# Patient Record
Sex: FEMALE | Race: WHITE | NOT HISPANIC OR LATINO | URBAN - NONMETROPOLITAN AREA
[De-identification: names, ages, dates, MRNs, and addresses within clinical notes are randomized per-mention and may not be internally consistent; named-entity substitution may affect disease eponyms.]

---

## 2021-02-25 ENCOUNTER — TELEMEDICINE (OUTPATIENT)
Dept: PSYCHIATRY | Facility: CLINIC | Age: 56
End: 2021-02-25

## 2021-02-25 DIAGNOSIS — F33.1 MAJOR DEPRESSIVE DISORDER, RECURRENT EPISODE, MODERATE (HCC): Primary | Chronic | ICD-10-CM

## 2021-02-25 DIAGNOSIS — G47.9 SLEEPING DIFFICULTIES: ICD-10-CM

## 2021-02-25 DIAGNOSIS — F41.9 ANXIETY DISORDER, UNSPECIFIED TYPE: Chronic | ICD-10-CM

## 2021-02-25 PROCEDURE — 90792 PSYCH DIAG EVAL W/MED SRVCS: CPT | Performed by: NURSE PRACTITIONER

## 2021-02-25 RX ORDER — METHOCARBAMOL 500 MG/1
500 TABLET, FILM COATED ORAL 3 TIMES DAILY PRN
COMMUNITY
End: 2022-08-09

## 2021-02-25 RX ORDER — CALCIUM CARBONATE 300MG(750)
10 TABLET,CHEWABLE ORAL
COMMUNITY

## 2021-02-25 RX ORDER — BUSPIRONE HYDROCHLORIDE 10 MG/1
10 TABLET ORAL 2 TIMES DAILY
COMMUNITY
End: 2021-03-24 | Stop reason: SDUPTHER

## 2021-02-25 RX ORDER — TEMAZEPAM 30 MG/1
30 CAPSULE ORAL NIGHTLY PRN
COMMUNITY
End: 2022-11-08

## 2021-02-25 RX ORDER — LORATADINE 10 MG/1
10 CAPSULE, LIQUID FILLED ORAL DAILY
COMMUNITY
End: 2022-11-08

## 2021-02-25 RX ORDER — VENLAFAXINE HYDROCHLORIDE 75 MG/1
75 CAPSULE, EXTENDED RELEASE ORAL DAILY
COMMUNITY
End: 2021-03-24 | Stop reason: SDUPTHER

## 2021-02-25 NOTE — TREATMENT PLAN
Multi-Disciplinary Problems (from Behavioral Health Treatment Plan)    Active Problems     Problem: Anxiety  Start Date: 02/25/21    Problem Details: The patient self-scales this problem as a 9 with 10 being the worst.        Goal Priority Start Date Expected End Date End Date    Patient will develop and implement behavioral and cognitive strategies to reduce anxiety and irrational fears. -- 02/25/21 -- --    Goal Details: Progress toward goal:  Not appropriate to rate progress toward goal since this is the initial treatment plan.        Goal Intervention Frequency Start Date End Date    Help patient explore past emotional issues in relation to present anxiety. Q Month 02/25/21 --    Intervention Details: Duration of treatment until until remission of symptoms.        Goal Intervention Frequency Start Date End Date    Help patient develop an awareness of their cognitive and physical responses to anxiety. Q Month 02/25/21 --    Intervention Details: Duration of treatment until until remission of symptoms.              Problem: Depression  Start Date: 02/25/21    Problem Details: The patient self-scales this problem as a 7-8 with 10 being the worst.        Goal Priority Start Date Expected End Date End Date    Patient will demonstrate the ability to initiate new constructive life skills outside of sessions on a consistent basis. -- 02/25/21 -- --    Goal Details: Progress toward goal:  Not appropriate to rate progress toward goal since this is the initial treatment plan.        Goal Intervention Frequency Start Date End Date    Assist patient in setting attainable activities of daily living goals. PRN 02/25/21 --    Goal Intervention Frequency Start Date End Date    Provide education about depression Q Month 02/25/21 --    Intervention Details: Duration of treatment until until remission of symptoms.        Goal Intervention Frequency Start Date End Date    Assist patient in developing healthy coping strategies. Q  Month 02/25/21 --    Intervention Details: Duration of treatment until until remission of symptoms.                           I have discussed and reviewed this treatment plan with the patient.  The patient has verbally agreed with this treatment plan (no signatures are obtained at today's visit as the patient is a telehealth patient and is unable to print and sign this document, therefore verbal agreement is obtained).

## 2021-03-23 ENCOUNTER — BULK ORDERING (OUTPATIENT)
Dept: CASE MANAGEMENT | Facility: OTHER | Age: 56
End: 2021-03-23

## 2021-03-23 DIAGNOSIS — Z23 IMMUNIZATION DUE: ICD-10-CM

## 2021-03-24 ENCOUNTER — TELEMEDICINE (OUTPATIENT)
Dept: PSYCHIATRY | Facility: CLINIC | Age: 56
End: 2021-03-24

## 2021-03-24 DIAGNOSIS — F41.9 ANXIETY DISORDER, UNSPECIFIED TYPE: Chronic | ICD-10-CM

## 2021-03-24 DIAGNOSIS — F33.1 MAJOR DEPRESSIVE DISORDER, RECURRENT EPISODE, MODERATE (HCC): Primary | Chronic | ICD-10-CM

## 2021-03-24 DIAGNOSIS — G47.9 SLEEPING DIFFICULTIES: ICD-10-CM

## 2021-03-24 PROCEDURE — 99214 OFFICE O/P EST MOD 30 MIN: CPT | Performed by: NURSE PRACTITIONER

## 2021-03-24 RX ORDER — VENLAFAXINE HYDROCHLORIDE 75 MG/1
75 CAPSULE, EXTENDED RELEASE ORAL DAILY
Qty: 30 CAPSULE | Refills: 0 | Status: SHIPPED | OUTPATIENT
Start: 2021-03-24 | End: 2021-04-21 | Stop reason: SDUPTHER

## 2021-03-24 RX ORDER — BUSPIRONE HYDROCHLORIDE 15 MG/1
15 TABLET ORAL 2 TIMES DAILY
Qty: 60 TABLET | Refills: 0 | Status: SHIPPED | OUTPATIENT
Start: 2021-03-24 | End: 2021-04-21 | Stop reason: SDUPTHER

## 2021-03-24 NOTE — PROGRESS NOTES
"This provider is located at the [facility] (through [facility]), [address], [place], [address] using a secure [email or web address] Video Visit through [employer]. Patient is being seen remotely via telehealth at their home address in [place], and stated they are in a secure environment for this session. The patient's condition being diagnosed/treated is appropriate for telemedicine. The provider identified herself as well as her credentials.   The patient, and/or patients guardian, consent to be seen remotely, and when consent is given they understand that the consent allows for patient identifiable information to be sent to a third party as needed.   They may refuse to be seen remotely at any time. The electronic data is encrypted and password protected, and the patient and/or guardian has been advised of the potential risks to privacy not withstanding such measures.    You have chosen to receive care through a telehealth visit.  Do you consent to use a video/audio connection for your medical care today? Yes        Subjective   Lovely Mixon is a 56 y.o. female who presents today for follow up    Chief Complaint:  Depression, anxiety, and sleeping difficulties    Accompanied by: The patient is alone at today's encounter    History of Present Illness:   The patient describes her mood as about the same over the last few weeks.  The patient states she has had \"a bad few weeks\" with not having any income, \"it being tax time\", and trying to deal with her divorce.  She also states the \"little girl\", a 12 year old female, she has custody of is having some trouble, and that has worsened her moods some.  The patient states she has an upcoming court appointment due to her divorce, and this has her nervous.  She states her PCP also recently referred her to Neurology since she is working on re-obtaining her disability.  She reports the neurologist told her physically she should not have any " "difficulty obtaining her disability, but since she has an associates degree that may make things more difficult.  The patient reports her appetite as good.  The patient reports her sleep as fair.  She states she wakes up all through the night, but is able to fall back to sleep, it is just interrupted sleep.  The patient denies any nightmares, but states she is having \"random dreams\".  The patient denies any new medical problems or changes in medications since last appointment with this facility.  The patient reports compliance with current medication regimen.  The patient denies any current side effects from her current medication regimen.  The patient denies any abnormal muscle movements or tics.  The patient rates her depression at a 7/10 on a 0-10 scale, with 10 being the worst.  The patient rates her anxiety at a 7/10 on a 0-10 scale, with 10 being the worst.  The patient would like to increase her medications at this visit to help with her anxiety.  The patient denies any suicidal or homicidal ideations, plans, or intent at today's encounter and is convincing.  The patient denies any auditory hallucinations or visual hallucinations.  The patient does not endorse any significant symptoms consistent with lea or psychosis at today's encounter.      Primary Care Provider:  Mine Abarca    Prior Psychiatric Medications:  Zoloft - states was taking 100 mg and she was still having anxiety and mood symptoms  Effexor XR 75 mg by mouth once daily  Buspirone 10 mg by mouth twice daily  Temazepam 30 mg by mouth once daily at bedtime - states she has taken this since around 2014 for cerebral palsy and also sleep    Last Menstrual Period:  Uterine ablation 1/6/21.  Denies any chance of pregnancy.  The patient was educated that her prescribed medications can have potential risk to a developing fetus. The patient is advised to contact this APRN/this office if she becomes pregnant or plans to become pregnant.  Pt " verbalizes understanding and acknowledged agreement with this plan in her own words.          The following portions of the patient's history were reviewed and updated as appropriate: allergies, current medications, past family history, past medical history, past social history, past surgical history and problem list.          Past Medical History:  Past Medical History:   Diagnosis Date   • Anxiety    • Cerebral palsy (CMS/HCC)    • Depression    • Lazy eye    • PTSD (post-traumatic stress disorder)    • Seasonal allergies    • Vision decreased        Social History:  Social History     Socioeconomic History   • Marital status: Unknown     Spouse name: Not on file   • Number of children: Not on file   • Years of education: Not on file   • Highest education level: Not on file   Tobacco Use   • Smoking status: Current Every Day Smoker     Packs/day: 0.50   • Smokeless tobacco: Never Used   Substance and Sexual Activity   • Alcohol use: Not Currently     Comment: States an occasional social drink, but not often   • Drug use: Never   • Sexual activity: Not Currently     Partners: Male       Family History:  Family History   Problem Relation Age of Onset   • Heart attack Mother    • Stroke Father    • Alcohol abuse Father    • Anxiety disorder Sister    • Depression Sister    • Alcohol abuse Sister    • Breast cancer Other        Past Surgical History:  Past Surgical History:   Procedure Laterality Date   • CATARACT EXTRACTION     • ENDOMETRIAL ABLATION     • LEG SURGERY      bilateral lower extremity surgeries due to cerebral palsy complications       Problem List:  There is no problem list on file for this patient.      Allergy:   No Known Allergies     Current Medications:   Current Outpatient Medications   Medication Sig Dispense Refill   • busPIRone (BUSPAR) 15 MG tablet Take 1 tablet by mouth 2 (Two) Times a Day. 60 tablet 0   • Loratadine 10 MG capsule Take 10 mg by mouth Daily.     • Melatonin 10 MG tablet  dispersible Place 10 mg on the tongue every night at bedtime.     • methocarbamol (ROBAXIN) 500 MG tablet Take 500 mg by mouth 3 (Three) Times a Day As Needed for Muscle Spasms.     • temazepam (RESTORIL) 30 MG capsule Take 30 mg by mouth At Night As Needed for Sleep.     • venlafaxine XR (EFFEXOR-XR) 75 MG 24 hr capsule Take 1 capsule by mouth Daily. 30 capsule 0     No current facility-administered medications for this visit.       Review of Symptoms:    Review of Systems   Constitutional: Positive for activity change, appetite change and fatigue. Negative for chills and fever.   HENT: Negative.    Eyes: Negative.    Respiratory: Negative.    Cardiovascular: Negative.    Gastrointestinal: Negative.    Endocrine: Negative.    Genitourinary: Negative.    Musculoskeletal: Negative.    Skin: Negative.    Neurological: Negative.    Psychiatric/Behavioral: Positive for agitation, decreased concentration, sleep disturbance, depressed mood and stress. Negative for behavioral problems, dysphoric mood, hallucinations, self-injury, suicidal ideas and negative for hyperactivity. The patient is nervous/anxious.          Physical Exam:   There were no vitals taken for this visit. There is no height or weight on file to calculate BMI.   Due to the remote nature of this encounter (virtual encounter), vitals were unable to be obtained.  Height stated at 65 inches.  Weight stated at 155-160 pounds.      Physical Exam  Constitutional:       Appearance: She is well-developed.   Neurological:      Mental Status: She is alert and oriented to person, place, and time.   Psychiatric:         Attention and Perception: Attention normal.         Mood and Affect: Mood is depressed. Affect is blunt.         Speech: Speech normal.         Behavior: Behavior normal. Behavior is cooperative.         Thought Content: Thought content normal. Thought content does not include homicidal or suicidal ideation. Thought content does not include homicidal  or suicidal plan.         Cognition and Memory: Cognition and memory normal.         Judgment: Judgment normal.         Mental Status Exam:   Hygiene:   good  Cooperation:  Cooperative  Eye Contact:  Fair  Psychomotor Behavior:  Appropriate  Affect:  Blunted  Mood: depressed  Hopelessness: Denies  Speech:  Normal  Thought Process:  Goal directed and Linear  Thought Content:  Mood congruent  Suicidal:  None  Homicidal:  None  Hallucinations:  None  Delusion:  None  Memory:  Intact  Orientation:  Person, Place, Time and Situation  Reliability:  fair  Insight:  Fair  Judgement:  Fair  Impulse Control:  Fair  Physical/Medical Issues:  No        PHQ-9 Depression Screening  Little interest or pleasure in doing things? 2   Feeling down, depressed, or hopeless? 2   Trouble falling or staying asleep, or sleeping too much? 1   Feeling tired or having little energy? 1   Poor appetite or overeating? 1   Feeling bad about yourself - or that you are a failure or have let yourself or your family down? 2   Trouble concentrating on things, such as reading the newspaper or watching television? 1   Moving or speaking so slowly that other people could have noticed? Or the opposite - being so fidgety or restless that you have been moving around a lot more than usual? 1   Thoughts that you would be better off dead, or of hurting yourself in some way? 1   PHQ-9 Total Score 12   If you checked off any problems, how difficult have these problems made it for you to do your work, take care of things at home, or get along with other people? Somewhat difficult     PHQ-9 Total Score: 12      Feeling nervous, anxious or on edge: More than half the days  Not being able to stop or control worrying: Nearly every day  Worrying too much about different things: Nearly every day  Trouble Relaxing: Several days  Being so restless that it is hard to sit still: More than half the days  Feeling afraid as if something awful might happen: More than half the  days  Becoming easily annoyed or irritable: More than half the days  THIAGO 7 Total Score: 15  If you checked any problems, how difficult have these problems made it for you to do your work, take care of things at home, or get along with other people: Somewhat difficult      PROMIS scale screening tool that patient filled out virtually reviewed by this APRN at today's encounter.          Lab Results:   No visits with results within 1 Month(s) from this visit.   Latest known visit with results is:   No results found for any previous visit.         Assessment/Plan   Problems Addressed this Visit     None      Visit Diagnoses     Major depressive disorder, recurrent episode, moderate (CMS/HCC)  (Chronic)   -  Primary    Relevant Medications    busPIRone (BUSPAR) 15 MG tablet    venlafaxine XR (EFFEXOR-XR) 75 MG 24 hr capsule    Anxiety disorder, unspecified type  (Chronic)       Relevant Medications    busPIRone (BUSPAR) 15 MG tablet    venlafaxine XR (EFFEXOR-XR) 75 MG 24 hr capsule    Sleeping difficulties          Diagnoses       Codes Comments    Major depressive disorder, recurrent episode, moderate (CMS/HCC)    -  Primary ICD-10-CM: F33.1  ICD-9-CM: 296.32     Anxiety disorder, unspecified type     ICD-10-CM: F41.9  ICD-9-CM: 300.00     Sleeping difficulties     ICD-10-CM: G47.9  ICD-9-CM: 780.50           Visit Diagnoses:    ICD-10-CM ICD-9-CM   1. Major depressive disorder, recurrent episode, moderate (CMS/HCC)  F33.1 296.32   2. Anxiety disorder, unspecified type  F41.9 300.00   3. Sleeping difficulties  G47.9 780.50          GOALS:  Short Term Goals: Patient will be compliant with medication, and patient will have no significant medication related side effects.  Patient will be engaged in psychotherapy as indicated.  Patient will report subjective improvement of symptoms.  Long term goals: To stabilize mood and treat/improve subjective symptoms, the patient will stay out of the hospital, the patient will be at  an optimal level of functioning, and the patient will take all medications as prescribed.  The patient verbalized understanding and agreement with goals that were mutually set.      TREATMENT PLAN: Continue supportive psychotherapy efforts and medications as indicated.  The patient states she would like this APRN to take over prescribing her Effexor and buspirone from her primary care provider.  Continue Effexor XR 75 mg by mouth once daily in the mornings for mood.  Increase buspirone to 15 mg by mouth twice daily for mood.  Medication and treatment options, both pharmacological and non-pharmacological treatment options, discussed during today's visit, including any off label use of medication. Patient acknowledged and verbally consented with current treatment plan and was educated on the importance of compliance with treatment and follow-up appointments.      Lovely Mixon  reports that she has been smoking. She has been smoking about 0.50 packs per day. She has never used smokeless tobacco.. I have educated her on the risk of diseases from using tobacco products such as cancer, COPD and heart disease.     I advised her to quit and she is not willing to quit.    I spent 3  minutes counseling the patient.      MEDICATION ISSUES:  Discussed medication options and treatment plan of prescribed medication, any off label use of medication, as well as the risks, benefits, any black box warnings including increased suicidality, and side effects including but not limited to potential falls, dizziness, possible impaired driving, GI side effects (change in appetite, abdominal discomfort, nausea, vomiting, diarrhea, and/or constipation), dry mouth, somnolence, sedation, insomnia, activation, agitation, irritation, tremors, abnormal muscle movements or disorders, headache, sweating, possible bruising or rare bleeding, electrolyte and/or fluid abnormalities, change in blood pressure/heart rate/and or heart rhythm, sexual  dysfunction, and metabolic adversities among others. Patient and/or guardian agreeable to call the office with any worsening of symptoms or onset of side effects, or if any concerns or questions arise.  The contact information for the office is made available to the patient and/or guardian.  Patient and/or guardian agreeable to call 911 or go to the nearest ER should they begin having any SI/HI, or if any urgent concerns arise. No medication side effects or related complaints today.      SUICIDE RISK ASSESSMENT: Unalterable demographics and a history of mental health intervention indicate this patient is in a high risk category compared to the general population. At present, the patient denies active SI/HI, intentions, or plans at this time and agrees to seek immediate care should such thoughts develop. The patient verbalizes understanding of how to access emergency care if needed and agrees to do so. Consideration of suicide risk and protective factors such as history, current presentation, individual strengths and weaknesses, psychosocial and environmental stressors and variables, psychiatric illness and symptoms, medical conditions and pain, took place in this interview. Based on those considerations, the patient is determined: within individual baseline and presenting no imminent risk for suicide or homicide. Other recommendations: The patient does not meet the criteria for inpatient admission and is not a safety risk to self or others at today's visit. Inpatient treatment offers no significant advantages over outpatient treatment for this patient at today's visit.      SAFETY PLAN:  Patient was given ample time for questions and fully participated in treatment planning.  Patient was encouraged to call the clinic with any questions or concerns.  Patient was informed of access to emergency care. If patient were to develop any significant symptomatology, suicidal ideation, homicidal ideation, any concerns, or feel  unsafe at any time they are to call the clinic and if unable to get immediate assistance should immediately call 911 or go to the nearest emergency room.  The patient is advised to remove or secure (lock away) all lethal weapons (including guns) and sharps (including razors, scissors, knives, etc.).  All medications (including any prescribed and any over the counter medications) should be stored in a safe and secured location that is not obtainable by children/adolescents.  Patient was given an opportunity and encouraged to ask questions about their medication, illness, and treatment. Patient contracted verbally for the following: If you are experiencing an emotional crisis or have thoughts of harming yourself or others, please go to your nearest local emergency room or call 911. Will continue to re-assess medication response and side effects frequently to establish efficacy and ensure safety. Risks, any black box warnings, side effects, off label usage, and benefits of medication and treatment discussed with patient, along with potential adverse side effects of current and/or newly prescribed medication, alternative treatment options, and OTC medications.  Patient verbalized understanding of potential risks, any off label use of medication, any black box warnings, and any side effects in their own words. The patient verbalized understanding and agreed to comply with the safety plan discussed in their own words.  Patient given the number to the office. Number also available to the 24- hour suicide hotline.        MEDS ORDERED DURING VISIT:  New Medications Ordered This Visit   Medications   • busPIRone (BUSPAR) 15 MG tablet     Sig: Take 1 tablet by mouth 2 (Two) Times a Day.     Dispense:  60 tablet     Refill:  0   • venlafaxine XR (EFFEXOR-XR) 75 MG 24 hr capsule     Sig: Take 1 capsule by mouth Daily.     Dispense:  30 capsule     Refill:  0     Return in about 4 weeks (around 4/21/2021), or if symptoms worsen  or fail to improve, for Recheck.         Progress toward goal: Not at goal    Functional Status: Moderate impairment     Prognosis: Fair with Ongoing Treatment      Treatment plan completed: 2/25/21.            This document has been electronically signed by GISELA Vaughn  March 24, 2021 11:47 EDT    Please note that portions of this note were completed with a voice recognition program. Efforts were made to edit dictation, but occasionally words are mistranscribed.

## 2021-04-21 DIAGNOSIS — F33.1 MAJOR DEPRESSIVE DISORDER, RECURRENT EPISODE, MODERATE (HCC): Chronic | ICD-10-CM

## 2021-04-21 DIAGNOSIS — F41.9 ANXIETY DISORDER, UNSPECIFIED TYPE: Chronic | ICD-10-CM

## 2021-04-21 RX ORDER — BUSPIRONE HYDROCHLORIDE 15 MG/1
15 TABLET ORAL 2 TIMES DAILY
Qty: 60 TABLET | Refills: 0 | Status: SHIPPED | OUTPATIENT
Start: 2021-04-21 | End: 2021-05-12 | Stop reason: SDUPTHER

## 2021-04-21 RX ORDER — VENLAFAXINE HYDROCHLORIDE 75 MG/1
75 CAPSULE, EXTENDED RELEASE ORAL DAILY
Qty: 30 CAPSULE | Refills: 0 | Status: SHIPPED | OUTPATIENT
Start: 2021-04-21 | End: 2021-05-12 | Stop reason: SDUPTHER

## 2021-04-21 NOTE — TELEPHONE ENCOUNTER
I received a refill request for this patient, but the patient does not have a follow-up appointment scheduled.  A refill will be sent in this time, but the patient needs to be seen.  Can you please call and schedule a follow-up appointment for the patient at their convenience?  Thanks.

## 2021-05-07 ENCOUNTER — TELEPHONE (OUTPATIENT)
Dept: PSYCHIATRY | Facility: CLINIC | Age: 56
End: 2021-05-07

## 2021-05-10 NOTE — TELEPHONE ENCOUNTER
Patient can request medical records if needed.  Patient has only been seen twice, and has not been seen since March.  Patient has an upcoming appointment scheduled 5/12/21, we can discuss then if needed.

## 2021-05-12 ENCOUNTER — TELEMEDICINE (OUTPATIENT)
Dept: PSYCHIATRY | Facility: CLINIC | Age: 56
End: 2021-05-12

## 2021-05-12 DIAGNOSIS — F41.9 ANXIETY DISORDER, UNSPECIFIED TYPE: Chronic | ICD-10-CM

## 2021-05-12 DIAGNOSIS — Z86.59 HISTORY OF POSTTRAUMATIC STRESS DISORDER (PTSD): ICD-10-CM

## 2021-05-12 DIAGNOSIS — G47.9 SLEEPING DIFFICULTIES: ICD-10-CM

## 2021-05-12 DIAGNOSIS — F33.1 MAJOR DEPRESSIVE DISORDER, RECURRENT EPISODE, MODERATE (HCC): Primary | Chronic | ICD-10-CM

## 2021-05-12 PROCEDURE — 99214 OFFICE O/P EST MOD 30 MIN: CPT | Performed by: NURSE PRACTITIONER

## 2021-05-12 RX ORDER — BUSPIRONE HYDROCHLORIDE 15 MG/1
15 TABLET ORAL 3 TIMES DAILY
Qty: 90 TABLET | Refills: 0 | Status: SHIPPED | OUTPATIENT
Start: 2021-05-12 | End: 2021-06-07 | Stop reason: SDUPTHER

## 2021-05-12 RX ORDER — VENLAFAXINE HYDROCHLORIDE 75 MG/1
75 CAPSULE, EXTENDED RELEASE ORAL DAILY
Qty: 30 CAPSULE | Refills: 0 | Status: SHIPPED | OUTPATIENT
Start: 2021-05-12 | End: 2021-06-07 | Stop reason: SDUPTHER

## 2021-05-12 NOTE — PROGRESS NOTES
This provider is located at the [facility] (through [facility]), [address], [place], [address] using a secure [employer] Video Visit through [employer]. Patient is being seen remotely via telehealth at their home address in [place], and stated they are in a secure environment for this session. The patient's condition being diagnosed/treated is appropriate for telemedicine. The provider identified herself as well as her credentials.   The patient, and/or patients guardian, consent to be seen remotely, and when consent is given they understand that the consent allows for patient identifiable information to be sent to a third party as needed.   They may refuse to be seen remotely at any time. The electronic data is encrypted and password protected, and the patient and/or guardian has been advised of the potential risks to privacy not withstanding such measures.    You have chosen to receive care through a telehealth visit.  Do you consent to use a video/audio connection for your medical care today? Yes        Subjective   Lovely Mixon is a 56 y.o. female who presents today for follow up    Chief Complaint:  Depression, anxiety, and sleeping difficulties    Accompanied by: The patient's son is present during today's encounter, the patient gives verbal consent for her son to be present    History of Present Illness:   The patient describes her mood as anxious over the last few weeks.  The patient states she has been under a significant amount of stress recently regarding her divorce.  The patient states she has also been under a lot of stress with her 12-year-old daughter that she adopted.  The patient states her 12-year-old daughter has been having a lot of behavioral issues.  The patient states she is still in therapy, and therapy is going good at this time.  The patient states both her, and her therapist, feels she may need her medication adjusted to help with her worsening  "psychiatric symptoms, especially her anxiety.  The patient rates her depression at a 8/10 on a 0-10 scale, with 10 being the worst.  The patient reports her symptoms of depression as being \"depressed\" and crying a lot.  The patient rates her anxiety at a 8-9/10 on a 0-10 scale, with 10 being the worst.  The patient reports her symptoms of anxiety as feeling shaky all the time and crying a lot.  The patient reports her appetite as fair.  She states she is making herself eat.  The patient reports her sleep as fluctuating.  She states she is waking up at night a lot.  The patient states she averages around 5 hours of sleep each night, but it is interrupted.  The patient denies any recent nightmares. The patient denies any new medical problems or changes in medications since last appointment with this facility.  The patient states she does plan to file for disability, and reports she needs a letter from this APRN with her current diagnoses.  The patient is verbally requesting, at today's encounter, for this APRN to send her a letter with her current diagnoses.  The patient states her therapist is writing a letter for her also, and informed her that a letter from this APRN may be beneficial.  She states her primary care provider is also going to write a letter about her cerebral palsy.  The patient reports compliance with current medication regimen, she states she is only missed a couple doses here and there.  The patient states if she does happen to miss a dose of her medication she can tell a huge worsening of her moods.  The patient denies any current side effects from her current medication regimen.  The patient denies any abnormal muscle movements or tics.   The patient would like to increase her medications at this visit, specifically her BuSpar to help with her anxiety.  The patient states both her depressive and anxious symptoms are worsened, but she feels her anxiety is the worst at this time..  The patient " denies any suicidal or homicidal ideations, plans, or intent at today's encounter and is convincing.  The patient denies any auditory hallucinations or visual hallucinations.  The patient does not endorse any significant symptoms consistent with lea or psychosis at today's encounter.      Primary Care Provider:  Mine Abarca    Prior Psychiatric Medications:  Zoloft - states was taking 100 mg and she was still having anxiety and mood symptoms  Effexor XR 75 mg by mouth once daily  Buspirone 10 mg by mouth twice daily  Temazepam 30 mg by mouth once daily at bedtime - states she has taken this since around 2014 for cerebral palsy and also sleep    Last Menstrual Period:  Uterine ablation 1/6/21.  Denies any chance of pregnancy.  The patient was educated that her prescribed medications can have potential risk to a developing fetus. The patient is advised to contact this APRN/this office if she becomes pregnant or plans to become pregnant.  Pt verbalizes understanding and acknowledged agreement with this plan in her own words.          The following portions of the patient's history were reviewed and updated as appropriate: allergies, current medications, past family history, past medical history, past social history, past surgical history and problem list.          Past Medical History:  Past Medical History:   Diagnosis Date   • Anxiety    • Cerebral palsy (CMS/HCC)    • Depression    • Lazy eye    • PTSD (post-traumatic stress disorder)    • Seasonal allergies    • Vision decreased        Social History:  Social History     Socioeconomic History   • Marital status: Unknown     Spouse name: Not on file   • Number of children: Not on file   • Years of education: Not on file   • Highest education level: Not on file   Tobacco Use   • Smoking status: Current Every Day Smoker     Packs/day: 0.50   • Smokeless tobacco: Never Used   Substance and Sexual Activity   • Alcohol use: Not Currently     Comment: States  an occasional social drink, but not often   • Drug use: Never   • Sexual activity: Not Currently     Partners: Male       Family History:  Family History   Problem Relation Age of Onset   • Heart attack Mother    • Stroke Father    • Alcohol abuse Father    • Anxiety disorder Sister    • Depression Sister    • Alcohol abuse Sister    • Breast cancer Other        Past Surgical History:  Past Surgical History:   Procedure Laterality Date   • CATARACT EXTRACTION     • ENDOMETRIAL ABLATION     • LEG SURGERY      bilateral lower extremity surgeries due to cerebral palsy complications       Problem List:  There is no problem list on file for this patient.      Allergy:   No Known Allergies     Current Medications:   Current Outpatient Medications   Medication Sig Dispense Refill   • busPIRone (BUSPAR) 15 MG tablet Take 1 tablet by mouth 3 (Three) Times a Day. 90 tablet 0   • Loratadine 10 MG capsule Take 10 mg by mouth Daily.     • Melatonin 10 MG tablet dispersible Place 10 mg on the tongue every night at bedtime.     • methocarbamol (ROBAXIN) 500 MG tablet Take 500 mg by mouth 3 (Three) Times a Day As Needed for Muscle Spasms.     • temazepam (RESTORIL) 30 MG capsule Take 30 mg by mouth At Night As Needed for Sleep.     • venlafaxine XR (EFFEXOR-XR) 75 MG 24 hr capsule Take 1 capsule by mouth Daily. 30 capsule 0     No current facility-administered medications for this visit.       Review of Symptoms:    Review of Systems   Constitutional: Positive for activity change, appetite change and fatigue. Negative for chills and fever.   HENT: Negative.    Eyes: Negative.    Respiratory: Negative.    Cardiovascular: Negative.    Gastrointestinal: Negative.    Endocrine: Negative.    Genitourinary: Negative.    Musculoskeletal: Negative.    Skin: Negative.    Psychiatric/Behavioral: Positive for agitation, decreased concentration, sleep disturbance, depressed mood and stress. Negative for behavioral problems, dysphoric mood,  hallucinations, self-injury, suicidal ideas and negative for hyperactivity. The patient is nervous/anxious.          Physical Exam:   There were no vitals taken for this visit. There is no height or weight on file to calculate BMI.   Due to the remote nature of this encounter (virtual encounter), vitals were unable to be obtained.  Height stated at 65 inches.  Weight stated at 148-150 pounds.      Physical Exam  Constitutional:       Appearance: She is well-developed.   Neurological:      Mental Status: She is alert and oriented to person, place, and time.   Psychiatric:         Attention and Perception: Attention normal.         Mood and Affect: Mood is anxious.         Speech: Speech normal.         Behavior: Behavior normal. Behavior is cooperative.         Thought Content: Thought content normal. Thought content does not include homicidal or suicidal ideation. Thought content does not include homicidal or suicidal plan.         Cognition and Memory: Cognition and memory normal.         Judgment: Judgment normal.         Mental Status Exam:   Hygiene:   good  Cooperation:  Cooperative  Eye Contact:  Fair  Psychomotor Behavior:  Appropriate  Affect:  Appropriate  Mood: anxious  Hopelessness: Denies  Speech:  Normal  Thought Process:  Goal directed and Linear  Thought Content:  Mood congruent  Suicidal:  None  Homicidal:  None  Hallucinations:  None  Delusion:  None  Memory:  Intact  Orientation:  Person, Place, Time and Situation  Reliability:  fair  Insight:  Fair  Judgement:  Fair  Impulse Control:  Fair  Physical/Medical Issues:  No            Lab Results:   No visits with results within 1 Month(s) from this visit.   Latest known visit with results is:   No results found for any previous visit.         Assessment/Plan   Problems Addressed this Visit     None      Visit Diagnoses     Major depressive disorder, recurrent episode, moderate (CMS/HCC)  (Chronic)   -  Primary    Relevant Medications    busPIRone  (BUSPAR) 15 MG tablet    venlafaxine XR (EFFEXOR-XR) 75 MG 24 hr capsule    Anxiety disorder, unspecified type  (Chronic)       Relevant Medications    busPIRone (BUSPAR) 15 MG tablet    venlafaxine XR (EFFEXOR-XR) 75 MG 24 hr capsule    History of posttraumatic stress disorder (PTSD)        Relevant Medications    venlafaxine XR (EFFEXOR-XR) 75 MG 24 hr capsule    Sleeping difficulties          Diagnoses       Codes Comments    Major depressive disorder, recurrent episode, moderate (CMS/HCC)    -  Primary ICD-10-CM: F33.1  ICD-9-CM: 296.32     Anxiety disorder, unspecified type     ICD-10-CM: F41.9  ICD-9-CM: 300.00     History of posttraumatic stress disorder (PTSD)     ICD-10-CM: Z86.59  ICD-9-CM: V11.8     Sleeping difficulties     ICD-10-CM: G47.9  ICD-9-CM: 780.50           Visit Diagnoses:    ICD-10-CM ICD-9-CM   1. Major depressive disorder, recurrent episode, moderate (CMS/HCC)  F33.1 296.32   2. Anxiety disorder, unspecified type  F41.9 300.00   3. History of posttraumatic stress disorder (PTSD)  Z86.59 V11.8   4. Sleeping difficulties  G47.9 780.50          GOALS:  Short Term Goals: Patient will be compliant with medication, and patient will have no significant medication related side effects.  Patient will be engaged in psychotherapy as indicated.  Patient will report subjective improvement of symptoms.  Long term goals: To stabilize mood and treat/improve subjective symptoms, the patient will stay out of the hospital, the patient will be at an optimal level of functioning, and the patient will take all medications as prescribed.  The patient verbalized understanding and agreement with goals that were mutually set.      TREATMENT PLAN: Continue supportive psychotherapy efforts and medications as indicated.  Medication and treatment options, both pharmacological and non-pharmacological treatment options, discussed during today's visit, including any off label use of medication. Patient acknowledged and  verbally consented with current treatment plan and was educated on the importance of compliance with treatment and follow-up appointments.      - Continue Effexor XR 75 mg by mouth once daily in the mornings for mood.  - Increase buspirone to 15 mg by mouth three times daily for mood.       MEDICATION ISSUES:  Discussed medication options and treatment plan of prescribed medication, any off label use of medication, as well as the risks, benefits, any black box warnings including increased suicidality, and side effects including but not limited to potential falls, dizziness, possible impaired driving, GI side effects (change in appetite, abdominal discomfort, nausea, vomiting, diarrhea, and/or constipation), dry mouth, somnolence, sedation, insomnia, activation, agitation, irritation, tremors, abnormal muscle movements or disorders, headache, sweating, possible bruising or rare bleeding, electrolyte and/or fluid abnormalities, change in blood pressure/heart rate/and or heart rhythm, sexual dysfunction, and metabolic adversities among others. Patient and/or guardian agreeable to call the office with any worsening of symptoms or onset of side effects, or if any concerns or questions arise.  The contact information for the office is made available to the patient and/or guardian.  Patient and/or guardian agreeable to call 911 or go to the nearest ER should they begin having any SI/HI, or if any urgent concerns arise. No medication side effects or related complaints today.      SUICIDE RISK ASSESSMENT: Unalterable demographics and a history of mental health intervention indicate this patient is in a high risk category compared to the general population. At present, the patient denies active SI/HI, intentions, or plans at this time and agrees to seek immediate care should such thoughts develop. The patient verbalizes understanding of how to access emergency care if needed and agrees to do so. Consideration of suicide risk  and protective factors such as history, current presentation, individual strengths and weaknesses, psychosocial and environmental stressors and variables, psychiatric illness and symptoms, medical conditions and pain, took place in this interview. Based on those considerations, the patient is determined: within individual baseline and presenting no imminent risk for suicide or homicide. Other recommendations: The patient does not meet the criteria for inpatient admission and is not a safety risk to self or others at today's visit. Inpatient treatment offers no significant advantages over outpatient treatment for this patient at today's visit.      SAFETY PLAN:  Patient was given ample time for questions and fully participated in treatment planning.  Patient was encouraged to call the clinic with any questions or concerns.  Patient was informed of access to emergency care. If patient were to develop any significant symptomatology, suicidal ideation, homicidal ideation, any concerns, or feel unsafe at any time they are to call the clinic and if unable to get immediate assistance should immediately call 911 or go to the nearest emergency room.  The patient is advised to remove or secure (lock away) all lethal weapons (including guns) and sharps (including razors, scissors, knives, etc.).  All medications (including any prescribed and any over the counter medications) should be stored in a safe and secured location that is not obtainable by children/adolescents.  Patient was given an opportunity and encouraged to ask questions about their medication, illness, and treatment. Patient contracted verbally for the following: If you are experiencing an emotional crisis or have thoughts of harming yourself or others, please go to your nearest local emergency room or call 911. Will continue to re-assess medication response and side effects frequently to establish efficacy and ensure safety. Risks, any black box warnings, side  effects, off label usage, and benefits of medication and treatment discussed with patient, along with potential adverse side effects of current and/or newly prescribed medication, alternative treatment options, and OTC medications.  Patient verbalized understanding of potential risks, any off label use of medication, any black box warnings, and any side effects in their own words. The patient verbalized understanding and agreed to comply with the safety plan discussed in their own words.  Patient given the number to the office. Number also available to the 24- hour suicide hotline.          MEDS ORDERED DURING VISIT:  New Medications Ordered This Visit   Medications   • busPIRone (BUSPAR) 15 MG tablet     Sig: Take 1 tablet by mouth 3 (Three) Times a Day.     Dispense:  90 tablet     Refill:  0   • venlafaxine XR (EFFEXOR-XR) 75 MG 24 hr capsule     Sig: Take 1 capsule by mouth Daily.     Dispense:  30 capsule     Refill:  0     Return in about 4 weeks (around 6/9/2021), or if symptoms worsen or fail to improve, for Next scheduled follow up and Recheck.         Progress toward goal: Not at goal    Functional Status: Moderate impairment     Prognosis: Fair with Ongoing Treatment      Treatment plan completed: 2/25/21.            This document has been electronically signed by GISELA Vaughn  May 12, 2021 11:00 EDT    Please note that portions of this note were completed with a voice recognition program. Efforts were made to edit dictation, but occasionally words are mistranscribed.

## 2021-06-07 ENCOUNTER — TELEMEDICINE (OUTPATIENT)
Dept: PSYCHIATRY | Facility: CLINIC | Age: 56
End: 2021-06-07

## 2021-06-07 DIAGNOSIS — F41.9 ANXIETY DISORDER, UNSPECIFIED TYPE: Chronic | ICD-10-CM

## 2021-06-07 DIAGNOSIS — G47.9 SLEEPING DIFFICULTIES: ICD-10-CM

## 2021-06-07 DIAGNOSIS — F33.1 MAJOR DEPRESSIVE DISORDER, RECURRENT EPISODE, MODERATE (HCC): Primary | Chronic | ICD-10-CM

## 2021-06-07 DIAGNOSIS — Z86.59 HISTORY OF POSTTRAUMATIC STRESS DISORDER (PTSD): Chronic | ICD-10-CM

## 2021-06-07 PROCEDURE — 99214 OFFICE O/P EST MOD 30 MIN: CPT | Performed by: NURSE PRACTITIONER

## 2021-06-07 RX ORDER — BUSPIRONE HYDROCHLORIDE 30 MG/1
30 TABLET ORAL 2 TIMES DAILY
Qty: 60 TABLET | Refills: 0 | Status: SHIPPED | OUTPATIENT
Start: 2021-06-07 | End: 2021-07-07 | Stop reason: SDUPTHER

## 2021-06-07 RX ORDER — VENLAFAXINE HYDROCHLORIDE 75 MG/1
75 CAPSULE, EXTENDED RELEASE ORAL DAILY
Qty: 30 CAPSULE | Refills: 0 | Status: SHIPPED | OUTPATIENT
Start: 2021-06-07 | End: 2021-06-29 | Stop reason: SDUPTHER

## 2021-06-07 NOTE — PROGRESS NOTES
This provider is located at the [facility] (through [facility]), [address], [place], [address] using a secure [employer] Video Visit through [employer]. Patient is being seen remotely via telehealth at their home address in [place], and stated they are in a secure environment for this session. The patient's condition being diagnosed/treated is appropriate for telemedicine. The provider identified herself as well as her credentials.   The patient, and/or patients guardian, consent to be seen remotely, and when consent is given they understand that the consent allows for patient identifiable information to be sent to a third party as needed.   They may refuse to be seen remotely at any time. The electronic data is encrypted and password protected, and the patient and/or guardian has been advised of the potential risks to privacy not withstanding such measures.    You have chosen to receive care through a telehealth visit.  Do you consent to use a video/audio connection for your medical care today? Yes        Subjective   Lovely Mixon is a 56 y.o. female who presents today for follow up    Chief Complaint:  Depression, anxiety, and sleeping difficulties    Accompanied by: The patient is interviewed alone at today's encounter    History of Present Illness:   The patient describes her mood as about the same over the last few weeks.  The patient states she was supposed to go to court today regarding the divorce, but her  postponed it due to needing some more time to look into some different things.  The patient states if her ex doesn't agree to her terms, the main thing she wants and needs is the house, regardless of everything else.  She states therapy is going good at this time, and they are still meeting weekly.  The patient rates her depression at a 9/10 on a 0-10 scale, with 10 being the worst.  The patient reports her symptoms of depression as being feeling like crying  all the time, and feelings of hopelessness.  The patient rates her anxiety at a 9/10 on a 0-10 scale, with 10 being the worst.  The patient reports her symptoms of anxiety as being constantly nervous all the time.  The patient reports her appetite as fair.  She states she is having to make herself eat, and only eats 1-2 times per day.  The patient reports her sleep as poor.  She states she typically sleeps better when she isn't stressed out, but she is under a lot of stress right now.  The patient states she averages 5-6 hours of sleep each night.  The patient reports dreams about her  telling her things that she can't do. The patient denies any new medical problems or changes in medications since last appointment with this facility.  The patient reports compliance with current medication regimen.  The patient denies any current side effects from her current medication regimen.  The patient denies any abnormal muscle movements or tics.   The patient would like to adjust her medications at this visit, specifically the Buspar.  The patient denies any suicidal or homicidal ideations, plans, or intent at today's encounter and is convincing.  The patient denies any auditory hallucinations or visual hallucinations.  The patient does not endorse any significant symptoms consistent with lea or psychosis at today's encounter.      Primary Care Provider:  Mine Abarca    Prior Psychiatric Medications:  Zoloft - states was taking 100 mg and she was still having anxiety and mood symptoms  Effexor XR 75 mg by mouth once daily  Buspirone 10 mg by mouth twice daily  Temazepam 30 mg by mouth once daily at bedtime - states she has taken this since around 2014 for cerebral palsy and also sleep    Last Menstrual Period:  One month ago, or just over that.  Uterine ablation on 1/6/21.  Denies any chance of pregnancy.  The patient was educated that her prescribed medications can have potential risk to a developing  fetus. The patient is advised to contact this APRN/this office if she becomes pregnant or plans to become pregnant.  Pt verbalizes understanding and acknowledged agreement with this plan in her own words.          The following portions of the patient's history were reviewed and updated as appropriate: allergies, current medications, past family history, past medical history, past social history, past surgical history and problem list.          Past Medical History:  Past Medical History:   Diagnosis Date   • Anxiety    • Cerebral palsy (CMS/HCC)    • Depression    • Lazy eye    • PTSD (post-traumatic stress disorder)    • Seasonal allergies    • Vision decreased        Social History:  Social History     Socioeconomic History   • Marital status: Unknown     Spouse name: Not on file   • Number of children: Not on file   • Years of education: Not on file   • Highest education level: Not on file   Tobacco Use   • Smoking status: Current Every Day Smoker     Packs/day: 0.50   • Smokeless tobacco: Never Used   Substance and Sexual Activity   • Alcohol use: Not Currently     Comment: States an occasional social drink, but not often   • Drug use: Never   • Sexual activity: Not Currently     Partners: Male       Family History:  Family History   Problem Relation Age of Onset   • Heart attack Mother    • Stroke Father    • Alcohol abuse Father    • Anxiety disorder Sister    • Depression Sister    • Alcohol abuse Sister    • Breast cancer Other        Past Surgical History:  Past Surgical History:   Procedure Laterality Date   • CATARACT EXTRACTION     • ENDOMETRIAL ABLATION     • LEG SURGERY      bilateral lower extremity surgeries due to cerebral palsy complications       Problem List:  There is no problem list on file for this patient.      Allergy:   No Known Allergies     Current Medications:   Current Outpatient Medications   Medication Sig Dispense Refill   • busPIRone (BUSPAR) 30 MG tablet Take 1 tablet by mouth 2  (two) times a day. 60 tablet 0   • Loratadine 10 MG capsule Take 10 mg by mouth Daily.     • Melatonin 10 MG tablet dispersible Place 10 mg on the tongue every night at bedtime.     • methocarbamol (ROBAXIN) 500 MG tablet Take 500 mg by mouth 3 (Three) Times a Day As Needed for Muscle Spasms.     • temazepam (RESTORIL) 30 MG capsule Take 30 mg by mouth At Night As Needed for Sleep.     • venlafaxine XR (EFFEXOR-XR) 75 MG 24 hr capsule Take 1 capsule by mouth Daily. 30 capsule 0     No current facility-administered medications for this visit.       Review of Symptoms:    Review of Systems   Constitutional: Positive for activity change, appetite change and fatigue. Negative for chills, fever, unexpected weight gain and unexpected weight loss.   HENT: Negative.    Eyes: Negative.    Respiratory: Negative.    Cardiovascular: Negative.    Gastrointestinal: Negative.    Endocrine: Negative.    Genitourinary: Negative.    Musculoskeletal: Negative.    Skin: Negative.    Psychiatric/Behavioral: Positive for agitation, decreased concentration, sleep disturbance, depressed mood and stress. Negative for behavioral problems, dysphoric mood, hallucinations, self-injury, suicidal ideas and negative for hyperactivity. The patient is nervous/anxious.          Physical Exam:   There were no vitals taken for this visit. There is no height or weight on file to calculate BMI.   Due to the remote nature of this encounter (virtual encounter), vitals were unable to be obtained.  Height stated at 65 inches.  Weight stated at 148-150 pounds.      Physical Exam  Constitutional:       Appearance: She is well-developed.   Neurological:      Mental Status: She is alert and oriented to person, place, and time.   Psychiatric:         Attention and Perception: Attention normal.         Mood and Affect: Mood is anxious.         Speech: Speech normal.         Behavior: Behavior normal. Behavior is cooperative.         Thought Content: Thought  content normal. Thought content does not include homicidal or suicidal ideation. Thought content does not include homicidal or suicidal plan.         Cognition and Memory: Cognition and memory normal.         Judgment: Judgment normal.         Mental Status Exam:   Hygiene:   good  Cooperation:  Cooperative  Eye Contact:  Good  Psychomotor Behavior:  Appropriate  Affect:  Appropriate  Mood: anxious  Hopelessness: 9  Speech:  Normal  Thought Process:  Linear  Thought Content:  Mood congruent  Suicidal:  None  Homicidal:  None  Hallucinations:  None  Delusion:  None  Memory:  Intact  Orientation:  Person, Place, Time and Situation  Reliability:  good  Insight:  Good  Judgement:  Good  Impulse Control:  Good  Physical/Medical Issues:  No            Lab Results:   No visits with results within 1 Month(s) from this visit.   Latest known visit with results is:   No results found for any previous visit.         Assessment/Plan   Problems Addressed this Visit     None      Visit Diagnoses     Major depressive disorder, recurrent episode, moderate (CMS/HCC)  (Chronic)   -  Primary    Relevant Medications    busPIRone (BUSPAR) 30 MG tablet    venlafaxine XR (EFFEXOR-XR) 75 MG 24 hr capsule    Anxiety disorder, unspecified type  (Chronic)       Relevant Medications    busPIRone (BUSPAR) 30 MG tablet    venlafaxine XR (EFFEXOR-XR) 75 MG 24 hr capsule    History of posttraumatic stress disorder (PTSD)  (Chronic)       Relevant Medications    venlafaxine XR (EFFEXOR-XR) 75 MG 24 hr capsule    Sleeping difficulties          Diagnoses       Codes Comments    Major depressive disorder, recurrent episode, moderate (CMS/HCC)    -  Primary ICD-10-CM: F33.1  ICD-9-CM: 296.32     Anxiety disorder, unspecified type     ICD-10-CM: F41.9  ICD-9-CM: 300.00     History of posttraumatic stress disorder (PTSD)     ICD-10-CM: Z86.59  ICD-9-CM: V11.8     Sleeping difficulties     ICD-10-CM: G47.9  ICD-9-CM: 780.50           Visit Diagnoses:     ICD-10-CM ICD-9-CM   1. Major depressive disorder, recurrent episode, moderate (CMS/HCC)  F33.1 296.32   2. Anxiety disorder, unspecified type  F41.9 300.00   3. History of posttraumatic stress disorder (PTSD)  Z86.59 V11.8   4. Sleeping difficulties  G47.9 780.50          GOALS:  Short Term Goals: Patient will be compliant with medication, and patient will have no significant medication related side effects.  Patient will be engaged in psychotherapy as indicated.  Patient will report subjective improvement of symptoms.  Long term goals: To stabilize mood and treat/improve subjective symptoms, the patient will stay out of the hospital, the patient will be at an optimal level of functioning, and the patient will take all medications as prescribed.  The patient verbalized understanding and agreement with goals that were mutually set.      TREATMENT PLAN: Continue supportive psychotherapy efforts and medications as indicated.  Medication and treatment options, both pharmacological and non-pharmacological treatment options, discussed during today's visit, including any off label use of medication. Patient acknowledged and verbally consented with current treatment plan and was educated on the importance of compliance with treatment and follow-up appointments.      - Continue Effexor XR 75 mg by mouth once daily in the mornings for mood.  - Increase buspirone to 30 mg by mouth two times daily for mood.       MEDICATION ISSUES:  Discussed medication options and treatment plan of prescribed medication, any off label use of medication, as well as the risks, benefits, any black box warnings including increased suicidality, and side effects including but not limited to potential falls, dizziness, possible impaired driving, GI side effects (change in appetite, abdominal discomfort, nausea, vomiting, diarrhea, and/or constipation), dry mouth, somnolence, sedation, insomnia, activation, agitation, irritation, tremors, abnormal  muscle movements or disorders, headache, sweating, possible bruising or rare bleeding, electrolyte and/or fluid abnormalities, change in blood pressure/heart rate/and or heart rhythm, sexual dysfunction, and metabolic adversities among others. Patient and/or guardian agreeable to call the office with any worsening of symptoms or onset of side effects, or if any concerns or questions arise.  The contact information for the office is made available to the patient and/or guardian.  Patient and/or guardian agreeable to call 911 or go to the nearest ER should they begin having any SI/HI, or if any urgent concerns arise. No medication side effects or related complaints today.      SUICIDE RISK ASSESSMENT: Unalterable demographics and a history of mental health intervention indicate this patient is in a high risk category compared to the general population. At present, the patient denies active SI/HI, intentions, or plans at this time and agrees to seek immediate care should such thoughts develop. The patient verbalizes understanding of how to access emergency care if needed and agrees to do so. Consideration of suicide risk and protective factors such as history, current presentation, individual strengths and weaknesses, psychosocial and environmental stressors and variables, psychiatric illness and symptoms, medical conditions and pain, took place in this interview. Based on those considerations, the patient is determined: within individual baseline and presenting no imminent risk for suicide or homicide. Other recommendations: The patient does not meet the criteria for inpatient admission and is not a safety risk to self or others at today's visit. Inpatient treatment offers no significant advantages over outpatient treatment for this patient at today's visit.      SAFETY PLAN:  Patient was given ample time for questions and fully participated in treatment planning.  Patient was encouraged to call the clinic with any  questions or concerns.  Patient was informed of access to emergency care. If patient were to develop any significant symptomatology, suicidal ideation, homicidal ideation, any concerns, or feel unsafe at any time they are to call the clinic and if unable to get immediate assistance should immediately call 911 or go to the nearest emergency room.  The patient is advised to remove or secure (lock away) all lethal weapons (including guns) and sharps (including razors, scissors, knives, etc.).  All medications (including any prescribed and any over the counter medications) should be stored in a safe and secured location that is not obtainable by children/adolescents.  Patient was given an opportunity and encouraged to ask questions about their medication, illness, and treatment. Patient contracted verbally for the following: If you are experiencing an emotional crisis or have thoughts of harming yourself or others, please go to your nearest local emergency room or call 911. Will continue to re-assess medication response and side effects frequently to establish efficacy and ensure safety. Risks, any black box warnings, side effects, off label usage, and benefits of medication and treatment discussed with patient, along with potential adverse side effects of current and/or newly prescribed medication, alternative treatment options, and OTC medications.  Patient verbalized understanding of potential risks, any off label use of medication, any black box warnings, and any side effects in their own words. The patient verbalized understanding and agreed to comply with the safety plan discussed in their own words.  Patient given the number to the office. Number also available to the 24- hour suicide hotline.          MEDS ORDERED DURING VISIT:  New Medications Ordered This Visit   Medications   • busPIRone (BUSPAR) 30 MG tablet     Sig: Take 1 tablet by mouth 2 (two) times a day.     Dispense:  60 tablet     Refill:  0   •  venlafaxine XR (EFFEXOR-XR) 75 MG 24 hr capsule     Sig: Take 1 capsule by mouth Daily.     Dispense:  30 capsule     Refill:  0     Return in about 4 weeks (around 7/5/2021), or if symptoms worsen or fail to improve, for Next scheduled follow up and Recheck.         Progress toward goal: Not at goal    Functional Status: Moderate impairment     Prognosis: Fair with Ongoing Treatment      Treatment plan completed: 2/25/21.            This document has been electronically signed by GISELA Vaughn  June 7, 2021 15:50 EDT    Please note that portions of this note were completed with a voice recognition program. Efforts were made to edit dictation, but occasionally words are mistranscribed.

## 2021-06-29 DIAGNOSIS — F41.9 ANXIETY DISORDER, UNSPECIFIED TYPE: Chronic | ICD-10-CM

## 2021-06-29 DIAGNOSIS — F33.1 MAJOR DEPRESSIVE DISORDER, RECURRENT EPISODE, MODERATE (HCC): Chronic | ICD-10-CM

## 2021-06-29 DIAGNOSIS — Z86.59 HISTORY OF POSTTRAUMATIC STRESS DISORDER (PTSD): Chronic | ICD-10-CM

## 2021-06-29 RX ORDER — VENLAFAXINE HYDROCHLORIDE 75 MG/1
75 CAPSULE, EXTENDED RELEASE ORAL DAILY
Qty: 30 CAPSULE | Refills: 0 | Status: SHIPPED | OUTPATIENT
Start: 2021-06-29 | End: 2021-07-07 | Stop reason: SDUPTHER

## 2021-07-07 ENCOUNTER — TELEMEDICINE (OUTPATIENT)
Dept: PSYCHIATRY | Facility: CLINIC | Age: 56
End: 2021-07-07

## 2021-07-07 DIAGNOSIS — Z86.59 HISTORY OF POSTTRAUMATIC STRESS DISORDER (PTSD): Chronic | ICD-10-CM

## 2021-07-07 DIAGNOSIS — F41.9 ANXIETY DISORDER, UNSPECIFIED TYPE: Chronic | ICD-10-CM

## 2021-07-07 DIAGNOSIS — G47.9 SLEEPING DIFFICULTIES: ICD-10-CM

## 2021-07-07 DIAGNOSIS — F33.1 MAJOR DEPRESSIVE DISORDER, RECURRENT EPISODE, MODERATE (HCC): Primary | Chronic | ICD-10-CM

## 2021-07-07 PROCEDURE — 99214 OFFICE O/P EST MOD 30 MIN: CPT | Performed by: NURSE PRACTITIONER

## 2021-07-07 RX ORDER — VENLAFAXINE HYDROCHLORIDE 150 MG/1
150 CAPSULE, EXTENDED RELEASE ORAL DAILY
Qty: 30 CAPSULE | Refills: 0 | Status: SHIPPED | OUTPATIENT
Start: 2021-07-07 | End: 2021-08-11 | Stop reason: SDUPTHER

## 2021-07-07 RX ORDER — BUSPIRONE HYDROCHLORIDE 30 MG/1
30 TABLET ORAL 2 TIMES DAILY
Qty: 60 TABLET | Refills: 0 | Status: SHIPPED | OUTPATIENT
Start: 2021-07-07 | End: 2021-08-11 | Stop reason: SDUPTHER

## 2021-07-07 NOTE — PROGRESS NOTES
"This provider is located at the [facility] (through [facility]), [address], [facility], [address] using a secure [employer] Video Visit through [employer] ???. Patient is being seen remotely via telehealth at their home address in [place], and stated they are in a secure environment for this session. The patient's condition being diagnosed/treated is appropriate for telemedicine. The provider identified herself as well as her credentials.   The patient, and/or patients guardian, consent to be seen remotely, and when consent is given they understand that the consent allows for patient identifiable information to be sent to a third party as needed.   They may refuse to be seen remotely at any time. The electronic data is encrypted and password protected, and the patient and/or guardian has been advised of the potential risks to privacy not withstanding such measures.    You have chosen to receive care through a telehealth visit.  Do you consent to use a video/audio connection for your medical care today? Yes        Subjective   Lovely Mixon is a 56 y.o. female who presents today for follow up    Chief Complaint:  Depression, anxiety, and sleeping difficulties    Accompanied by: The patient is interviewed alone at today's encounter    History of Present Illness:   The patient describes her mood as more depressed over the last few weeks.  The patient states she still hasn't heard from her , even though she has been calling.  She states her  was supposed to do a response to her ex's proposal, and they have an upcoming court date potentially.  The patient rates her depression at a 10/10 on a 0-10 scale, with 10 being the worst.  The patient reports her symptoms of depression as being \"drained\", and she has no desire or motivation to do anything.  She states she pushes herself to do things, but she doesn't want to participate in any thing.  The patient rates her anxiety at a " 7-8/10 on a 0-10 scale, with 10 being the worst.  The patient reports her symptoms of anxiety as being excessive worry, and states she can't get things out of her mind.  She states she is always thinking about things that could go wrong.  She states the Buspar does help, especially with her shaking, for a while after she takes it.  The patient is still in therapy once weekly.  The patient reports her appetite as good.  The patient reports her sleep as improved. The patient reports dreams, but states she wouldn't call them nightmares.  She states she dreams about being back in her house, and her and her ex have worked things out.  The patient denies any new medical problems or changes in medications since last appointment with this facility.  The patient reports compliance with current medication regimen.  The patient denies any current side effects from her current medication regimen.  The patient denies any abnormal muscle movements or tics.   The patient would like to adjust her medications at this visit to help with her worsened moods.  The patient denies any suicidal or homicidal ideations, plans, or intent at today's encounter and is convincing.  She states her children are her protective factors against suicide.  The patient denies any auditory hallucinations or visual hallucinations.  The patient does not endorse any significant symptoms consistent with lea or psychosis at today's encounter.      Primary Care Provider:  Mine Abarca    Prior Psychiatric Medications:  Zoloft - states was taking 100 mg and she was still having anxiety and mood symptoms  Effexor XR 75 mg by mouth once daily  Buspirone 10 mg by mouth twice daily  Temazepam 30 mg by mouth once daily at bedtime - states she has taken this since around 2014 for cerebral palsy and also sleep    Last Menstrual Period:  One month ago, or just over that.  Uterine ablation on 1/6/21.  Denies any chance of pregnancy.  The patient was educated  that her prescribed medications can have potential risk to a developing fetus. The patient is advised to contact this APRN/this office if she becomes pregnant or plans to become pregnant.  Pt verbalizes understanding and acknowledged agreement with this plan in her own words.          The following portions of the patient's history were reviewed and updated as appropriate: allergies, current medications, past family history, past medical history, past social history, past surgical history and problem list.          Past Medical History:  Past Medical History:   Diagnosis Date   • Anxiety    • Cerebral palsy (CMS/HCC)    • Depression    • Lazy eye    • PTSD (post-traumatic stress disorder)    • Seasonal allergies    • Vision decreased        Social History:  Social History     Socioeconomic History   • Marital status: Unknown     Spouse name: Not on file   • Number of children: Not on file   • Years of education: Not on file   • Highest education level: Not on file   Tobacco Use   • Smoking status: Current Every Day Smoker     Packs/day: 0.50   • Smokeless tobacco: Never Used   Substance and Sexual Activity   • Alcohol use: Not Currently     Comment: States an occasional social drink, but not often   • Drug use: Never   • Sexual activity: Not Currently     Partners: Male       Family History:  Family History   Problem Relation Age of Onset   • Heart attack Mother    • Stroke Father    • Alcohol abuse Father    • Anxiety disorder Sister    • Depression Sister    • Alcohol abuse Sister    • Breast cancer Other        Past Surgical History:  Past Surgical History:   Procedure Laterality Date   • CATARACT EXTRACTION     • ENDOMETRIAL ABLATION     • LEG SURGERY      bilateral lower extremity surgeries due to cerebral palsy complications       Problem List:  There is no problem list on file for this patient.      Allergy:   No Known Allergies     Current Medications:   Current Outpatient Medications   Medication Sig  Dispense Refill   • busPIRone (BUSPAR) 30 MG tablet Take 1 tablet by mouth 2 (two) times a day. 60 tablet 0   • Loratadine 10 MG capsule Take 10 mg by mouth Daily.     • Melatonin 10 MG tablet dispersible Place 10 mg on the tongue every night at bedtime.     • methocarbamol (ROBAXIN) 500 MG tablet Take 500 mg by mouth 3 (Three) Times a Day As Needed for Muscle Spasms.     • temazepam (RESTORIL) 30 MG capsule Take 30 mg by mouth At Night As Needed for Sleep.     • venlafaxine XR (EFFEXOR-XR) 150 MG 24 hr capsule Take 1 capsule by mouth Daily. 30 capsule 0     No current facility-administered medications for this visit.       Review of Symptoms:    Review of Systems   Constitutional: Positive for activity change, appetite change and fatigue. Negative for chills, fever, unexpected weight gain and unexpected weight loss.   HENT: Negative.    Eyes: Negative.    Respiratory: Negative.    Cardiovascular: Negative.    Gastrointestinal: Negative.    Endocrine: Negative.    Genitourinary: Negative.    Musculoskeletal: Negative.    Skin: Negative.    Psychiatric/Behavioral: Positive for agitation, decreased concentration, sleep disturbance, depressed mood and stress. Negative for behavioral problems, dysphoric mood, hallucinations, self-injury, suicidal ideas and negative for hyperactivity. The patient is nervous/anxious.          Physical Exam:   There were no vitals taken for this visit. There is no height or weight on file to calculate BMI.   Due to the remote nature of this encounter (virtual encounter), vitals were unable to be obtained.  Height stated at 65 inches.  Weight stated at 148-150 pounds.      Physical Exam  Constitutional:       Appearance: She is well-developed.   Neurological:      Mental Status: She is alert and oriented to person, place, and time.   Psychiatric:         Attention and Perception: Attention normal.         Mood and Affect: Affect normal. Mood is depressed.         Speech: Speech normal.          Behavior: Behavior normal. Behavior is cooperative.         Thought Content: Thought content normal. Thought content does not include homicidal or suicidal ideation. Thought content does not include homicidal or suicidal plan.         Cognition and Memory: Cognition and memory normal.         Judgment: Judgment normal.         Mental Status Exam:   Hygiene:   good  Cooperation:  Cooperative  Eye Contact:  Good  Psychomotor Behavior:  Appropriate  Affect:  Appropriate  Mood: depressed  Hopelessness: 9  Speech:  Normal  Thought Process:  Linear  Thought Content:  Mood congruent  Suicidal:  None  Homicidal:  None  Hallucinations:  None  Delusion:  None  Memory:  Intact  Orientation:  Person, Place, Time and Situation  Reliability:  good  Insight:  Good  Judgement:  Good  Impulse Control:  Good  Physical/Medical Issues:  No            Lab Results:   No visits with results within 1 Month(s) from this visit.   Latest known visit with results is:   No results found for any previous visit.         Assessment/Plan   Problems Addressed this Visit     None      Visit Diagnoses     Major depressive disorder, recurrent episode, moderate (CMS/HCC)  (Chronic)   -  Primary    Relevant Medications    busPIRone (BUSPAR) 30 MG tablet    venlafaxine XR (EFFEXOR-XR) 150 MG 24 hr capsule    Anxiety disorder, unspecified type  (Chronic)       Relevant Medications    busPIRone (BUSPAR) 30 MG tablet    venlafaxine XR (EFFEXOR-XR) 150 MG 24 hr capsule    History of posttraumatic stress disorder (PTSD)  (Chronic)       Relevant Medications    venlafaxine XR (EFFEXOR-XR) 150 MG 24 hr capsule    Sleeping difficulties          Diagnoses       Codes Comments    Major depressive disorder, recurrent episode, moderate (CMS/HCC)    -  Primary ICD-10-CM: F33.1  ICD-9-CM: 296.32     Anxiety disorder, unspecified type     ICD-10-CM: F41.9  ICD-9-CM: 300.00     History of posttraumatic stress disorder (PTSD)     ICD-10-CM: Z86.59  ICD-9-CM: V11.8      Sleeping difficulties     ICD-10-CM: G47.9  ICD-9-CM: 780.50           Visit Diagnoses:    ICD-10-CM ICD-9-CM   1. Major depressive disorder, recurrent episode, moderate (CMS/HCC)  F33.1 296.32   2. Anxiety disorder, unspecified type  F41.9 300.00   3. History of posttraumatic stress disorder (PTSD)  Z86.59 V11.8   4. Sleeping difficulties  G47.9 780.50          GOALS:  Short Term Goals: Patient will be compliant with medication, and patient will have no significant medication related side effects.  Patient will be engaged in psychotherapy as indicated.  Patient will report subjective improvement of symptoms.  Long term goals: To stabilize mood and treat/improve subjective symptoms, the patient will stay out of the hospital, the patient will be at an optimal level of functioning, and the patient will take all medications as prescribed.  The patient verbalized understanding and agreement with goals that were mutually set.      TREATMENT PLAN: Continue supportive psychotherapy efforts and medications as indicated.  Medication and treatment options, both pharmacological and non-pharmacological treatment options, discussed during today's visit, including any off label use of medication. Patient acknowledged and verbally consented with current treatment plan and was educated on the importance of compliance with treatment and follow-up appointments.      - Increase Effexor XR to 150 mg by mouth once daily in the mornings for mood.  - Continue buspirone 30 mg by mouth two times daily for mood.       MEDICATION ISSUES:  Discussed medication options and treatment plan of prescribed medication, any off label use of medication, as well as the risks, benefits, any black box warnings including increased suicidality, and side effects including but not limited to potential falls, dizziness, possible impaired driving, GI side effects (change in appetite, abdominal discomfort, nausea, vomiting, diarrhea, and/or constipation), dry  mouth, somnolence, sedation, insomnia, activation, agitation, irritation, tremors, abnormal muscle movements or disorders, headache, sweating, possible bruising or rare bleeding, electrolyte and/or fluid abnormalities, change in blood pressure/heart rate/and or heart rhythm, sexual dysfunction, and metabolic adversities among others. Patient and/or guardian agreeable to call the office with any worsening of symptoms or onset of side effects, or if any concerns or questions arise.  The contact information for the office is made available to the patient and/or guardian.  Patient and/or guardian agreeable to call 911 or go to the nearest ER should they begin having any SI/HI, or if any urgent concerns arise. No medication side effects or related complaints today.      SUICIDE RISK ASSESSMENT: Unalterable demographics and a history of mental health intervention indicate this patient is in a high risk category compared to the general population. At present, the patient denies active SI/HI, intentions, or plans at this time and agrees to seek immediate care should such thoughts develop. The patient verbalizes understanding of how to access emergency care if needed and agrees to do so. Consideration of suicide risk and protective factors such as history, current presentation, individual strengths and weaknesses, psychosocial and environmental stressors and variables, psychiatric illness and symptoms, medical conditions and pain, took place in this interview. Based on those considerations, the patient is determined: within individual baseline and presenting no imminent risk for suicide or homicide. Other recommendations: The patient does not meet the criteria for inpatient admission and is not a safety risk to self or others at today's visit. Inpatient treatment offers no significant advantages over outpatient treatment for this patient at today's visit.      SAFETY PLAN:  Patient was given ample time for questions and  fully participated in treatment planning.  Patient was encouraged to call the clinic with any questions or concerns.  Patient was informed of access to emergency care. If patient were to develop any significant symptomatology, suicidal ideation, homicidal ideation, any concerns, or feel unsafe at any time they are to call the clinic and if unable to get immediate assistance should immediately call 911 or go to the nearest emergency room.  The patient is advised to remove or secure (lock away) all lethal weapons (including guns) and sharps (including razors, scissors, knives, etc.).  All medications (including any prescribed and any over the counter medications) should be stored in a safe and secured location that is not obtainable by children/adolescents.  Patient was given an opportunity and encouraged to ask questions about their medication, illness, and treatment. Patient contracted verbally for the following: If you are experiencing an emotional crisis or have thoughts of harming yourself or others, please go to your nearest local emergency room or call 911. Will continue to re-assess medication response and side effects frequently to establish efficacy and ensure safety. Risks, any black box warnings, side effects, off label usage, and benefits of medication and treatment discussed with patient, along with potential adverse side effects of current and/or newly prescribed medication, alternative treatment options, and OTC medications.  Patient verbalized understanding of potential risks, any off label use of medication, any black box warnings, and any side effects in their own words. The patient verbalized understanding and agreed to comply with the safety plan discussed in their own words.  Patient given the number to the office. Number also available to the 24- hour suicide hotline.          MEDS ORDERED DURING VISIT:  New Medications Ordered This Visit   Medications   • busPIRone (BUSPAR) 30 MG tablet      Sig: Take 1 tablet by mouth 2 (two) times a day.     Dispense:  60 tablet     Refill:  0   • venlafaxine XR (EFFEXOR-XR) 150 MG 24 hr capsule     Sig: Take 1 capsule by mouth Daily.     Dispense:  30 capsule     Refill:  0     Return in about 4 weeks (around 8/4/2021), or if symptoms worsen or fail to improve, for Next scheduled follow up and Recheck.         Progress toward goal: Not at goal    Functional Status: Moderate impairment     Prognosis: Fair with Ongoing Treatment      Treatment plan completed: 2/25/21.            This document has been electronically signed by GISLEA Vaughn  July 7, 2021 15:20 EDT    Some of the data in this electronic note has been brought forward from a previous encounter, any necessary changes have been made, it has been reviewed by this APRN, and it is accurate.    Please note that portions of this note were completed with a voice recognition program. Efforts were made to edit dictation, but occasionally words are mistranscribed.

## 2021-08-11 ENCOUNTER — TELEMEDICINE (OUTPATIENT)
Dept: PSYCHIATRY | Facility: CLINIC | Age: 56
End: 2021-08-11

## 2021-08-11 DIAGNOSIS — F41.9 ANXIETY DISORDER, UNSPECIFIED TYPE: Chronic | ICD-10-CM

## 2021-08-11 DIAGNOSIS — G47.9 SLEEPING DIFFICULTIES: ICD-10-CM

## 2021-08-11 DIAGNOSIS — F33.1 MAJOR DEPRESSIVE DISORDER, RECURRENT EPISODE, MODERATE (HCC): Primary | Chronic | ICD-10-CM

## 2021-08-11 DIAGNOSIS — Z86.59 HISTORY OF POSTTRAUMATIC STRESS DISORDER (PTSD): Chronic | ICD-10-CM

## 2021-08-11 PROCEDURE — 99214 OFFICE O/P EST MOD 30 MIN: CPT | Performed by: NURSE PRACTITIONER

## 2021-08-11 RX ORDER — BUSPIRONE HYDROCHLORIDE 30 MG/1
30 TABLET ORAL 2 TIMES DAILY
Qty: 60 TABLET | Refills: 0 | Status: SHIPPED | OUTPATIENT
Start: 2021-08-11 | End: 2021-09-08 | Stop reason: SDUPTHER

## 2021-08-11 RX ORDER — VENLAFAXINE HYDROCHLORIDE 150 MG/1
150 CAPSULE, EXTENDED RELEASE ORAL DAILY
Qty: 30 CAPSULE | Refills: 0 | Status: SHIPPED | OUTPATIENT
Start: 2021-08-11 | End: 2021-09-08 | Stop reason: SDUPTHER

## 2021-08-11 NOTE — PROGRESS NOTES
This provider is located at the [facility] (through [facility]), [address], [facility], [address] using a secure [email or web address] Video Visit through [employer]. Patient is being seen remotely via telehealth at their home address in [place], and stated they are in a secure environment for this session. The patient's condition being diagnosed/treated is appropriate for telemedicine. The provider identified herself as well as her credentials.   The patient, and/or patients guardian, consent to be seen remotely, and when consent is given they understand that the consent allows for patient identifiable information to be sent to a third party as needed.   They may refuse to be seen remotely at any time. The electronic data is encrypted and password protected, and the patient and/or guardian has been advised of the potential risks to privacy not withstanding such measures.    You have chosen to receive care through a telehealth visit.  Do you consent to use a video/audio connection for your medical care today? Yes        Subjective   Lovely Mixon is a 56 y.o. female who presents today for follow up    Chief Complaint:  Depression, anxiety, and sleeping difficulties    Accompanied by: The patient is interviewed alone at today's encounter    History of Present Illness:   The patient describes her mood as not good over the last few weeks.  The patient states both her anxiety and depressive symptoms are high.  The patient states she is still meeting with her therapist, and her therapist told her there is no pill or medication that is going to take all this away or make her accept what is going on situationally in her life.  The patient states her therapist has mentioned possibly adding something else to her current medication regimen, but the patient states she feels like she would like to wait another month or two to see how she does first, and reports she would reach out to this APRN sooner  if it was needed or she needed her medications adjusted sooner.  The patient states she still hasn't heard from her , and that has been causing her a lot more stress.  The patient rates her depression at a 8/10 on a 0-10 scale, with 10 being the worst. The patient rates her anxiety at a 8/10 on a 0-10 scale, with 10 being the worst.  The patient reports her appetite as fair.  The patient reports her sleep as good at this time suprisingly.  The patient denies any new medical problems or changes in medications since last appointment with this facility.  The patient reports compliance with current medication regimen.  The patient denies any current side effects from her current medication regimen.  The patient denies any abnormal muscle movements or tics.   The patient would like to not adjust or change her medications at this visit.  The patient denies any suicidal or homicidal ideations, plans, or intent at today's encounter and is convincing.  The patient denies any auditory hallucinations or visual hallucinations.  The patient does not endorse any significant symptoms consistent with lea or psychosis at today's encounter.      Primary Care Provider:  Mine Abarca    Prior Psychiatric Medications:  Zoloft - states was taking 100 mg and she was still having anxiety and mood symptoms  Effexor XR 75 mg by mouth once daily  Buspirone 10 mg by mouth twice daily  Temazepam 30 mg by mouth once daily at bedtime - states she has taken this since around 2014 for cerebral palsy and also sleep    Last Menstrual Period:  2-3 weeks ago.  Uterine ablation on 1/6/21.  Denies any chance of pregnancy.  The patient was educated that her prescribed medications can have potential risk to a developing fetus. The patient is advised to contact this APRN/this office if she becomes pregnant or plans to become pregnant.  Pt verbalizes understanding and acknowledged agreement with this plan in her own words.          The  following portions of the patient's history were reviewed and updated as appropriate: allergies, current medications, past family history, past medical history, past social history, past surgical history and problem list.          Past Medical History:  Past Medical History:   Diagnosis Date   • Anxiety    • Cerebral palsy (CMS/HCC)    • Depression    • Lazy eye    • PTSD (post-traumatic stress disorder)    • Seasonal allergies    • Vision decreased        Social History:  Social History     Socioeconomic History   • Marital status: Unknown     Spouse name: Not on file   • Number of children: Not on file   • Years of education: Not on file   • Highest education level: Not on file   Tobacco Use   • Smoking status: Current Every Day Smoker     Packs/day: 0.50   • Smokeless tobacco: Never Used   Substance and Sexual Activity   • Alcohol use: Not Currently     Comment: States an occasional social drink, but not often   • Drug use: Never   • Sexual activity: Not Currently     Partners: Male       Family History:  Family History   Problem Relation Age of Onset   • Heart attack Mother    • Stroke Father    • Alcohol abuse Father    • Anxiety disorder Sister    • Depression Sister    • Alcohol abuse Sister    • Breast cancer Other        Past Surgical History:  Past Surgical History:   Procedure Laterality Date   • CATARACT EXTRACTION     • ENDOMETRIAL ABLATION     • LEG SURGERY      bilateral lower extremity surgeries due to cerebral palsy complications       Problem List:  There is no problem list on file for this patient.      Allergy:   No Known Allergies     Current Medications:   Current Outpatient Medications   Medication Sig Dispense Refill   • busPIRone (BUSPAR) 30 MG tablet Take 1 tablet by mouth 2 (two) times a day. 60 tablet 0   • Loratadine 10 MG capsule Take 10 mg by mouth Daily.     • Melatonin 10 MG tablet dispersible Place 10 mg on the tongue every night at bedtime.     • methocarbamol (ROBAXIN) 500 MG  tablet Take 500 mg by mouth 3 (Three) Times a Day As Needed for Muscle Spasms.     • temazepam (RESTORIL) 30 MG capsule Take 30 mg by mouth At Night As Needed for Sleep.     • venlafaxine XR (EFFEXOR-XR) 150 MG 24 hr capsule Take 1 capsule by mouth Daily. 30 capsule 0     No current facility-administered medications for this visit.       Review of Symptoms:    Review of Systems   Constitutional: Positive for activity change, appetite change and fatigue. Negative for chills, fever, unexpected weight gain and unexpected weight loss.   HENT: Negative.    Eyes: Negative.    Respiratory: Negative.    Cardiovascular: Negative.    Gastrointestinal: Negative.    Endocrine: Negative.    Genitourinary: Negative.    Musculoskeletal: Negative.    Skin: Negative.    Psychiatric/Behavioral: Positive for agitation, decreased concentration, sleep disturbance, depressed mood and stress. Negative for behavioral problems, dysphoric mood, hallucinations, self-injury, suicidal ideas and negative for hyperactivity. The patient is nervous/anxious.          Physical Exam:   There were no vitals taken for this visit. There is no height or weight on file to calculate BMI.   Due to the remote nature of this encounter (virtual encounter), vitals were unable to be obtained.  Height stated at 65 inches.  Weight stated at 148-150 pounds.        Physical Exam  Constitutional:       Appearance: She is well-developed.   Neurological:      Mental Status: She is alert and oriented to person, place, and time.   Psychiatric:         Attention and Perception: Attention normal.         Mood and Affect: Mood is depressed. Affect is blunt.         Speech: Speech normal.         Behavior: Behavior normal. Behavior is cooperative.         Thought Content: Thought content normal. Thought content does not include homicidal or suicidal ideation. Thought content does not include homicidal or suicidal plan.         Cognition and Memory: Cognition and memory  normal.         Judgment: Judgment normal.         Mental Status Exam:   Hygiene:   good  Cooperation:  Cooperative  Eye Contact:  Good  Psychomotor Behavior:  Appropriate  Affect:  Blunted  Mood: depressed  Hopelessness: 9  Speech:  Normal  Thought Process:  Linear  Thought Content:  Mood congruent  Suicidal:  None  Homicidal:  None  Hallucinations:  None  Delusion:  None  Memory:  Intact  Orientation:  Person, Place, Time and Situation  Reliability:  good  Insight:  Good  Judgement:  Good  Impulse Control:  Good  Physical/Medical Issues:  No            Lab Results:   No visits with results within 1 Month(s) from this visit.   Latest known visit with results is:   No results found for any previous visit.         Assessment/Plan   Problems Addressed this Visit     None      Visit Diagnoses     Major depressive disorder, recurrent episode, moderate (CMS/HCC)  (Chronic)   -  Primary    Relevant Medications    venlafaxine XR (EFFEXOR-XR) 150 MG 24 hr capsule    busPIRone (BUSPAR) 30 MG tablet    Anxiety disorder, unspecified type  (Chronic)       Relevant Medications    venlafaxine XR (EFFEXOR-XR) 150 MG 24 hr capsule    busPIRone (BUSPAR) 30 MG tablet    History of posttraumatic stress disorder (PTSD)  (Chronic)       Relevant Medications    venlafaxine XR (EFFEXOR-XR) 150 MG 24 hr capsule    Sleeping difficulties          Diagnoses       Codes Comments    Major depressive disorder, recurrent episode, moderate (CMS/HCC)    -  Primary ICD-10-CM: F33.1  ICD-9-CM: 296.32     Anxiety disorder, unspecified type     ICD-10-CM: F41.9  ICD-9-CM: 300.00     History of posttraumatic stress disorder (PTSD)     ICD-10-CM: Z86.59  ICD-9-CM: V11.8     Sleeping difficulties     ICD-10-CM: G47.9  ICD-9-CM: 780.50           Visit Diagnoses:    ICD-10-CM ICD-9-CM   1. Major depressive disorder, recurrent episode, moderate (CMS/HCC)  F33.1 296.32   2. Anxiety disorder, unspecified type  F41.9 300.00   3. History of posttraumatic stress  disorder (PTSD)  Z86.59 V11.8   4. Sleeping difficulties  G47.9 780.50          GOALS:  Short Term Goals: Patient will be compliant with medication, and patient will have no significant medication related side effects.  Patient will be engaged in psychotherapy as indicated.  Patient will report subjective improvement of symptoms.  Long term goals: To stabilize mood and treat/improve subjective symptoms, the patient will stay out of the hospital, the patient will be at an optimal level of functioning, and the patient will take all medications as prescribed.  The patient verbalized understanding and agreement with goals that were mutually set.      TREATMENT PLAN: Continue supportive psychotherapy efforts and medications as indicated.  Medication and treatment options, both pharmacological and non-pharmacological treatment options, discussed during today's visit, including any off label use of medication. Patient acknowledged and verbally consented with current treatment plan and was educated on the importance of compliance with treatment and follow-up appointments.      - Continue Effexor  mg by mouth once daily in the mornings for mood.  - Continue buspirone 30 mg by mouth two times daily for mood.       MEDICATION ISSUES:  Discussed medication options and treatment plan of prescribed medication, any off label use of medication, as well as the risks, benefits, any black box warnings including increased suicidality, and side effects including but not limited to potential falls, dizziness, possible impaired driving, GI side effects (change in appetite, abdominal discomfort, nausea, vomiting, diarrhea, and/or constipation), dry mouth, somnolence, sedation, insomnia, activation, agitation, irritation, tremors, abnormal muscle movements or disorders, headache, sweating, possible bruising or rare bleeding, electrolyte and/or fluid abnormalities, change in blood pressure/heart rate/and or heart rhythm, sexual  dysfunction, and metabolic adversities among others. Patient and/or guardian agreeable to call the office with any worsening of symptoms or onset of side effects, or if any concerns or questions arise.  The contact information for the office is made available to the patient and/or guardian.  Patient and/or guardian agreeable to call 911 or go to the nearest ER should they begin having any SI/HI, or if any urgent concerns arise. No medication side effects or related complaints today.      SUICIDE RISK ASSESSMENT: Unalterable demographics and a history of mental health intervention indicate this patient is in a high risk category compared to the general population. At present, the patient denies active SI/HI, intentions, or plans at this time and agrees to seek immediate care should such thoughts develop. The patient verbalizes understanding of how to access emergency care if needed and agrees to do so. Consideration of suicide risk and protective factors such as history, current presentation, individual strengths and weaknesses, psychosocial and environmental stressors and variables, psychiatric illness and symptoms, medical conditions and pain, took place in this interview. Based on those considerations, the patient is determined: within individual baseline and presenting no imminent risk for suicide or homicide. Other recommendations: The patient does not meet the criteria for inpatient admission and is not a safety risk to self or others at today's visit. Inpatient treatment offers no significant advantages over outpatient treatment for this patient at today's visit.      SAFETY PLAN:  Patient was given ample time for questions and fully participated in treatment planning.  Patient was encouraged to call the clinic with any questions or concerns.  Patient was informed of access to emergency care. If patient were to develop any significant symptomatology, suicidal ideation, homicidal ideation, any concerns, or feel  unsafe at any time they are to call the clinic and if unable to get immediate assistance should immediately call 911 or go to the nearest emergency room.  The patient is advised to remove or secure (lock away) all lethal weapons (including guns) and sharps (including razors, scissors, knives, etc.).  All medications (including any prescribed and any over the counter medications) should be stored in a safe and secured location that is not obtainable by children/adolescents.  Patient was given an opportunity and encouraged to ask questions about their medication, illness, and treatment. Patient contracted verbally for the following: If you are experiencing an emotional crisis or have thoughts of harming yourself or others, please go to your nearest local emergency room or call 911. Will continue to re-assess medication response and side effects frequently to establish efficacy and ensure safety. Risks, any black box warnings, side effects, off label usage, and benefits of medication and treatment discussed with patient, along with potential adverse side effects of current and/or newly prescribed medication, alternative treatment options, and OTC medications.  Patient verbalized understanding of potential risks, any off label use of medication, any black box warnings, and any side effects in their own words. The patient verbalized understanding and agreed to comply with the safety plan discussed in their own words.  Patient given the number to the office. Number also available to the 24- hour suicide hotline.          MEDS ORDERED DURING VISIT:  New Medications Ordered This Visit   Medications   • venlafaxine XR (EFFEXOR-XR) 150 MG 24 hr capsule     Sig: Take 1 capsule by mouth Daily.     Dispense:  30 capsule     Refill:  0   • busPIRone (BUSPAR) 30 MG tablet     Sig: Take 1 tablet by mouth 2 (two) times a day.     Dispense:  60 tablet     Refill:  0     Return in about 4 weeks (around 9/8/2021), or if symptoms worsen  or fail to improve, for Next scheduled follow up and Recheck.         Progress toward goal: Not at goal    Functional Status: Moderate impairment     Prognosis: Fair with Ongoing Treatment      Treatment plan completed: 2/25/21.            This document has been electronically signed by GISELA Vaughn  August 11, 2021 15:33 EDT    Some of the data in this electronic note has been brought forward from a previous encounter, any necessary changes have been made, it has been reviewed by this APRN, and it is accurate.    Please note that portions of this note were completed with a voice recognition program. Efforts were made to edit dictation, but occasionally words are mistranscribed.

## 2021-09-08 ENCOUNTER — TELEMEDICINE (OUTPATIENT)
Dept: PSYCHIATRY | Facility: CLINIC | Age: 56
End: 2021-09-08

## 2021-09-08 DIAGNOSIS — Z86.59 HISTORY OF POSTTRAUMATIC STRESS DISORDER (PTSD): ICD-10-CM

## 2021-09-08 DIAGNOSIS — F33.1 MAJOR DEPRESSIVE DISORDER, RECURRENT EPISODE, MODERATE (HCC): Primary | Chronic | ICD-10-CM

## 2021-09-08 DIAGNOSIS — F41.9 ANXIETY DISORDER, UNSPECIFIED TYPE: Chronic | ICD-10-CM

## 2021-09-08 DIAGNOSIS — G47.9 SLEEPING DIFFICULTIES: ICD-10-CM

## 2021-09-08 PROCEDURE — 99214 OFFICE O/P EST MOD 30 MIN: CPT | Performed by: NURSE PRACTITIONER

## 2021-09-08 RX ORDER — VENLAFAXINE HYDROCHLORIDE 150 MG/1
150 CAPSULE, EXTENDED RELEASE ORAL DAILY
Qty: 30 CAPSULE | Refills: 0 | Status: SHIPPED | OUTPATIENT
Start: 2021-09-08 | End: 2021-09-27 | Stop reason: SDUPTHER

## 2021-09-08 RX ORDER — LAMOTRIGINE 25 MG/1
25 TABLET ORAL DAILY
Qty: 30 TABLET | Refills: 0 | Status: SHIPPED | OUTPATIENT
Start: 2021-09-08 | End: 2021-09-27 | Stop reason: SDUPTHER

## 2021-09-08 RX ORDER — BUSPIRONE HYDROCHLORIDE 30 MG/1
30 TABLET ORAL 2 TIMES DAILY
Qty: 60 TABLET | Refills: 0 | Status: SHIPPED | OUTPATIENT
Start: 2021-09-08 | End: 2021-09-27 | Stop reason: SDUPTHER
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
## 2021-09-27 ENCOUNTER — TELEMEDICINE (OUTPATIENT)
Dept: PSYCHIATRY | Facility: CLINIC | Age: 56
End: 2021-09-27

## 2021-09-27 DIAGNOSIS — G47.9 SLEEPING DIFFICULTIES: ICD-10-CM

## 2021-09-27 DIAGNOSIS — Z86.59 HISTORY OF POSTTRAUMATIC STRESS DISORDER (PTSD): ICD-10-CM

## 2021-09-27 DIAGNOSIS — F41.9 ANXIETY DISORDER, UNSPECIFIED TYPE: Chronic | ICD-10-CM

## 2021-09-27 DIAGNOSIS — F33.1 MAJOR DEPRESSIVE DISORDER, RECURRENT EPISODE, MODERATE (HCC): Primary | Chronic | ICD-10-CM

## 2021-09-27 PROCEDURE — 99214 OFFICE O/P EST MOD 30 MIN: CPT | Performed by: NURSE PRACTITIONER

## 2021-09-27 RX ORDER — VENLAFAXINE HYDROCHLORIDE 150 MG/1
150 CAPSULE, EXTENDED RELEASE ORAL DAILY
Qty: 30 CAPSULE | Refills: 0 | Status: SHIPPED | OUTPATIENT
Start: 2021-09-27 | End: 2021-11-06 | Stop reason: SDUPTHER

## 2021-09-27 RX ORDER — BUSPIRONE HYDROCHLORIDE 30 MG/1
30 TABLET ORAL 2 TIMES DAILY
Qty: 60 TABLET | Refills: 0 | Status: SHIPPED | OUTPATIENT
Start: 2021-09-27 | End: 2021-11-06 | Stop reason: SDUPTHER

## 2021-09-27 RX ORDER — LAMOTRIGINE 25 MG/1
50 TABLET ORAL DAILY
Qty: 60 TABLET | Refills: 0 | Status: SHIPPED | OUTPATIENT
Start: 2021-09-27 | End: 2021-11-06 | Stop reason: SDUPTHER

## 2021-09-27 NOTE — PROGRESS NOTES
This provider is located at the [facility] (through [facility]), [address], [facility], [address] using a secure [employer] Video Visit through [employer]. Patient is being seen remotely via telehealth at their home address in [place], and stated they are in a secure environment for this session. The patient's condition being diagnosed/treated is appropriate for telemedicine. The provider identified herself as well as her credentials.   The patient, and/or patients guardian, consent to be seen remotely, and when consent is given they understand that the consent allows for patient identifiable information to be sent to a third party as needed.   They may refuse to be seen remotely at any time. The electronic data is encrypted and password protected, and the patient and/or guardian has been advised of the potential risks to privacy not withstanding such measures.    You have chosen to receive care through a telehealth visit.  Do you consent to use a video/audio connection for your medical care today? Yes        Subjective   Lovely Mixon is a 56 y.o. female who presents today for follow up    Chief Complaint:  Depression, anxiety, and sleeping difficulties    Accompanied by: The patient is interviewed alone at today's encounter    History of Present Illness:   The patient describes her mood as about the same over the last few weeks.  The patient states her anxiety has been really high recently.  The patient rates her depression at a 8/10 on a 0-10 scale, with 10 being the worst.  The patient reports her symptoms of depression as being not severe enough to have suicidal thoughts since starting the Lamictal, which has been good, but she still feels depressed.  She states she had a very stressful weekend, she wanted to see her children this past weekend, but was unable to so she has been more depressed because of that.  She states it has been almost 2 weeks since she has actually  been able to spend time with her children.  She states she is able to face time them, but she breaks down and cries almost every time because she misses them so bad.  The patient rates her anxiety at a 8/10 on a 0-10 scale, with 10 being the worst.  The patient states she has a court date this coming Monday, and they will be going in front of the commissioner.  She states this has her anxiety and stress really high.  She states her  was supposed to bring her into the office to prepare for this, and hasn't yet, so that has her stressed as well.  She states there is also supposed to be a wedding the evening of the court date, and she is supposed to be the maid of honor, and doing this after the court date has her feeling more stressed.  The patient reports her appetite as fair.  She states she knows she doesn't eat good, but is still eating.  She states the person she is staying with cooks a lot, so she is actually eating better where she is currently staying.  The patient reports her sleep as decreased.  She states she wakes up at least 2-3 times per night.  The patient states she averages 5-6 hours of sleep each night.  The patient states she dreams a lot sometimes at night. The patient denies any new medical problems or changes in medications since last appointment with this facility.  The patient reports compliance with current medication regimen.  The patient denies any current side effects from her current medication regimen.  The patient denies any abnormal muscle movements or tics.   The patient would like to increase her medications at this visit, specifically the Lamictal to help with her continued worsened moods.  The patient denies any suicidal or homicidal ideations, plans, or intent at today's encounter and is convincing.  The patient denies any auditory hallucinations or visual hallucinations.  The patient does not endorse any significant symptoms consistent with lea or psychosis at today's  encounter.        Primary Care Provider:  Mine Abarca    Prior Psychiatric Medications:  Temazepam 30 mg by mouth once daily at bedtime - states she has taken this since around 2014 for cerebral palsy and also sleep  Zoloft - states was taking 100 mg and she was still having anxiety and mood symptoms  Effexor XR 75 mg by mouth once daily  Buspirone 10 mg by mouth twice daily  Lamictal    Last Menstrual Period:  Currently Menstruating.  Uterine ablation on 1/6/21.  Denies any chance of pregnancy.  The patient was educated that her prescribed medications can have potential risk to a developing fetus. The patient is advised to contact this APRN/this office if she becomes pregnant or plans to become pregnant.  Pt verbalizes understanding and acknowledged agreement with this plan in her own words.          The following portions of the patient's history were reviewed and updated as appropriate: allergies, current medications, past family history, past medical history, past social history, past surgical history and problem list.          Past Medical History:  Past Medical History:   Diagnosis Date   • Anxiety    • Cerebral palsy (CMS/HCC)    • Depression    • Lazy eye    • PTSD (post-traumatic stress disorder)    • Seasonal allergies    • Vision decreased        Social History:  Social History     Socioeconomic History   • Marital status: Unknown     Spouse name: Not on file   • Number of children: Not on file   • Years of education: Not on file   • Highest education level: Not on file   Tobacco Use   • Smoking status: Current Every Day Smoker     Packs/day: 0.50   • Smokeless tobacco: Never Used   Substance and Sexual Activity   • Alcohol use: Not Currently     Comment: States an occasional social drink, but not often   • Drug use: Never   • Sexual activity: Not Currently     Partners: Male       Family History:  Family History   Problem Relation Age of Onset   • Heart attack Mother    • Stroke Father    •  Alcohol abuse Father    • Anxiety disorder Sister    • Depression Sister    • Alcohol abuse Sister    • Breast cancer Other        Past Surgical History:  Past Surgical History:   Procedure Laterality Date   • CATARACT EXTRACTION     • ENDOMETRIAL ABLATION     • LEG SURGERY      bilateral lower extremity surgeries due to cerebral palsy complications       Problem List:  There is no problem list on file for this patient.      Allergy:   No Known Allergies     Current Medications:   Current Outpatient Medications   Medication Sig Dispense Refill   • busPIRone (BUSPAR) 30 MG tablet Take 1 tablet by mouth 2 (two) times a day. 60 tablet 0   • lamoTRIgine (LaMICtal) 25 MG tablet Take 2 tablets by mouth Daily. 60 tablet 0   • Loratadine 10 MG capsule Take 10 mg by mouth Daily.     • Melatonin 10 MG tablet dispersible Place 10 mg on the tongue every night at bedtime.     • methocarbamol (ROBAXIN) 500 MG tablet Take 500 mg by mouth 3 (Three) Times a Day As Needed for Muscle Spasms.     • temazepam (RESTORIL) 30 MG capsule Take 30 mg by mouth At Night As Needed for Sleep.     • venlafaxine XR (EFFEXOR-XR) 150 MG 24 hr capsule Take 1 capsule by mouth Daily. 30 capsule 0     No current facility-administered medications for this visit.       Review of Symptoms:    Review of Systems   Constitutional: Positive for activity change and fatigue. Negative for chills, fever, unexpected weight gain and unexpected weight loss.   HENT: Negative.    Eyes: Negative.    Respiratory: Negative.    Cardiovascular: Negative.    Gastrointestinal: Negative.    Endocrine: Negative.    Genitourinary: Negative.    Musculoskeletal: Negative.    Skin: Negative.    Psychiatric/Behavioral: Positive for agitation, decreased concentration, sleep disturbance, depressed mood and stress. Negative for behavioral problems, dysphoric mood, hallucinations, self-injury, suicidal ideas and negative for hyperactivity. The patient is nervous/anxious.           Physical Exam:   There were no vitals taken for this visit. There is no height or weight on file to calculate BMI.   Due to the remote nature of this encounter (virtual encounter), vitals were unable to be obtained.  Height stated at 65 inches.  Weight stated at around 150 pounds.        Physical Exam  Constitutional:       Appearance: She is well-developed.   Neurological:      Mental Status: She is alert and oriented to person, place, and time.   Psychiatric:         Attention and Perception: Attention normal.         Mood and Affect: Affect normal. Mood is anxious.         Speech: Speech normal.         Behavior: Behavior normal. Behavior is cooperative.         Thought Content: Thought content normal. Thought content does not include homicidal or suicidal ideation. Thought content does not include homicidal or suicidal plan.         Cognition and Memory: Cognition and memory normal.         Judgment: Judgment normal.         Mental Status Exam:   Hygiene:   good  Cooperation:  Cooperative  Eye Contact:  Good  Psychomotor Behavior:  Appropriate  Affect:  Appropriate  Mood: anxious  Hopelessness: Denies  Speech:  Normal  Thought Process:  Linear  Thought Content:  Mood congruent  Suicidal:  None  Homicidal:  None  Hallucinations:  None  Delusion:  None  Memory:  Intact  Orientation:  Person, Place, Time and Situation  Reliability:  good  Insight:  Good  Judgement:  Good  Impulse Control:  Good  Physical/Medical Issues:  No            Lab Results:   No visits with results within 1 Month(s) from this visit.   Latest known visit with results is:   No results found for any previous visit.         Assessment/Plan   Problems Addressed this Visit     None      Visit Diagnoses     Major depressive disorder, recurrent episode, moderate (HCC)  (Chronic)   -  Primary    Relevant Medications    venlafaxine XR (EFFEXOR-XR) 150 MG 24 hr capsule    busPIRone (BUSPAR) 30 MG tablet    lamoTRIgine (LaMICtal) 25 MG tablet     Anxiety disorder, unspecified type  (Chronic)       Relevant Medications    venlafaxine XR (EFFEXOR-XR) 150 MG 24 hr capsule    busPIRone (BUSPAR) 30 MG tablet    History of posttraumatic stress disorder (PTSD)        Relevant Medications    venlafaxine XR (EFFEXOR-XR) 150 MG 24 hr capsule    Sleeping difficulties          Diagnoses       Codes Comments    Major depressive disorder, recurrent episode, moderate (HCC)    -  Primary ICD-10-CM: F33.1  ICD-9-CM: 296.32     Anxiety disorder, unspecified type     ICD-10-CM: F41.9  ICD-9-CM: 300.00     History of posttraumatic stress disorder (PTSD)     ICD-10-CM: Z86.59  ICD-9-CM: V11.8     Sleeping difficulties     ICD-10-CM: G47.9  ICD-9-CM: 780.50           Visit Diagnoses:    ICD-10-CM ICD-9-CM   1. Major depressive disorder, recurrent episode, moderate (HCC)  F33.1 296.32   2. Anxiety disorder, unspecified type  F41.9 300.00   3. History of posttraumatic stress disorder (PTSD)  Z86.59 V11.8   4. Sleeping difficulties  G47.9 780.50          GOALS:  Short Term Goals: Patient will be compliant with medication, and patient will have no significant medication related side effects.  Patient will be engaged in psychotherapy as indicated.  Patient will report subjective improvement of symptoms.  Long term goals: To stabilize mood and treat/improve subjective symptoms, the patient will stay out of the hospital, the patient will be at an optimal level of functioning, and the patient will take all medications as prescribed.  The patient verbalized understanding and agreement with goals that were mutually set.      TREATMENT PLAN: Continue supportive psychotherapy efforts and medications as indicated.  Medication and treatment options, both pharmacological and non-pharmacological treatment options, discussed during today's visit, including any off label use of medication. Patient acknowledged and verbally consented with current treatment plan and was educated on the importance of  compliance with treatment and follow-up appointments.      - Continue Effexor  mg by mouth once daily in the mornings for mood.  - Continue buspirone 30 mg by mouth two times daily for mood.   - Increase Lamictal to 50 mg by mouth once daily as an adjunct for mood.      MEDICATION ISSUES:  Discussed medication options and treatment plan of prescribed medication, any off label use of medication, as well as the risks, benefits, any black box warnings including increased suicidality, and side effects including but not limited to potential falls, dizziness, possible impaired driving, GI side effects (change in appetite, abdominal discomfort, nausea, vomiting, diarrhea, and/or constipation), dry mouth, somnolence, sedation, insomnia, activation, agitation, irritation, tremors, abnormal muscle movements or disorders, headache, sweating, possible bruising or rare bleeding, electrolyte and/or fluid abnormalities, change in blood pressure/heart rate/and or heart rhythm, sexual dysfunction, and metabolic adversities among others. Patient and/or guardian agreeable to call the office with any worsening of symptoms or onset of side effects, or if any concerns or questions arise.  The contact information for the office is made available to the patient and/or guardian.  Patient and/or guardian agreeable to call 911 or go to the nearest ER should they begin having any SI/HI, or if any urgent concerns arise. No medication side effects or related complaints today.    This APRN has discussed the benefits and risks of taking/continuing Lamictal (Lamotrigine).  The side effects of Lamictal can include a benign rash, blurred or double vision, dizziness, ataxia, sedation, headache, tremor, insomnia, poor coordination, fatigue,  nausea, vomiting, dyspepsia, rhinitis, infection, pharyngitis, asthenia, a rare but serious rash, rare multi-organ failure associated with Pires-Esteban Syndrome, toxic epidermal necrolysis, drug  hypersensitivity syndrome, rare blood dyscrasias, rare aseptic meningitis, rare sudden unexplained deaths in people with epilepsy, withdrawal seizures upon abrupt withdrawal, and rare activation of suicidal ideation and behavior (suicidality).  This APRN has discussed that a very slow dose titration when starting, or changing doses, of Lamictal may reduce the incidence of skin rash and other side effects.  The dosage should not be titrated upwards or increased faster than recommended due to the possibility of the discussed side effects and risk of development of a skin rash (which can become life threatening).    This APRN has also discussed that if the patient stops taking the Lamictal for 3-5 days or longer, it will be necessary to restart the drug with an initial dose titration, as rashes have been reported on reexposure.  If the patient and Provider decide to stop the Lamictal, the patient will follow the directions of this APRN/this office as a guided taper over about two weeks is appropriate due to the risk of relapse in bipolar disorder with those with a mood or bipolar disorder, the risk of seizures in those with epilepsy, and discontinuation symptoms upon rapid discontinuation of Lamictal.    The patient verbalizes understanding of benefits and risks as discussed, the patient/guardian feels the benefits outweigh the risks and is agreeable to continue/take Lamictal as discussed.  The patient is advised should any side effects or rash develops they are to stop the Lamictal immediately and contact this APRN/this office or go to the emergency department immediately.  The patient verbalizes understanding and agreement with treatment plan in their own words.      SUICIDE RISK ASSESSMENT: Unalterable demographics and a history of mental health intervention indicate this patient is in a high risk category compared to the general population. At present, the patient denies active SI/HI, intentions, or plans at this  time and agrees to seek immediate care should such thoughts develop. The patient verbalizes understanding of how to access emergency care if needed and agrees to do so. Consideration of suicide risk and protective factors such as history, current presentation, individual strengths and weaknesses, psychosocial and environmental stressors and variables, psychiatric illness and symptoms, medical conditions and pain, took place in this interview. Based on those considerations, the patient is determined: within individual baseline and presenting no imminent risk for suicide or homicide. Other recommendations: The patient does not meet the criteria for inpatient admission and is not a safety risk to self or others at today's visit. Inpatient treatment offers no significant advantages over outpatient treatment for this patient at today's visit.      SAFETY PLAN:  Patient was given ample time for questions and fully participated in treatment planning.  Patient was encouraged to call the clinic with any questions or concerns.  Patient was informed of access to emergency care. If patient were to develop any significant symptomatology, suicidal ideation, homicidal ideation, any concerns, or feel unsafe at any time they are to call the clinic and if unable to get immediate assistance should immediately call 911 or go to the nearest emergency room.  The patient is advised to remove or secure (lock away) all lethal weapons (including guns) and sharps (including razors, scissors, knives, etc.).  All medications (including any prescribed and any over the counter medications) should be stored in a safe and secured location that is not obtainable by children/adolescents.  Patient was given an opportunity and encouraged to ask questions about their medication, illness, and treatment. Patient contracted verbally for the following: If you are experiencing an emotional crisis or have thoughts of harming yourself or others, please go to  your nearest local emergency room or call 911. Will continue to re-assess medication response and side effects frequently to establish efficacy and ensure safety. Risks, any black box warnings, side effects, off label usage, and benefits of medication and treatment discussed with patient, along with potential adverse side effects of current and/or newly prescribed medication, alternative treatment options, and OTC medications.  Patient verbalized understanding of potential risks, any off label use of medication, any black box warnings, and any side effects in their own words. The patient verbalized understanding and agreed to comply with the safety plan discussed in their own words.  Patient given the number to the office. Number also available to the 24- hour suicide hotline.          MEDS ORDERED DURING VISIT:  New Medications Ordered This Visit   Medications   • venlafaxine XR (EFFEXOR-XR) 150 MG 24 hr capsule     Sig: Take 1 capsule by mouth Daily.     Dispense:  30 capsule     Refill:  0   • busPIRone (BUSPAR) 30 MG tablet     Sig: Take 1 tablet by mouth 2 (two) times a day.     Dispense:  60 tablet     Refill:  0   • lamoTRIgine (LaMICtal) 25 MG tablet     Sig: Take 2 tablets by mouth Daily.     Dispense:  60 tablet     Refill:  0     Return in about 4 weeks (around 10/25/2021), or if symptoms worsen or fail to improve, for Next scheduled follow up and Recheck.         Progress toward goal: Not at goal    Functional Status: Moderate impairment     Prognosis: Fair with Ongoing Treatment      Treatment plan completed: 2/25/21.            This document has been electronically signed by GISELA Vaughn  September 27, 2021 13:55 EDT    Some of the data in this electronic note has been brought forward from a previous encounter, any necessary changes have been made, it has been reviewed by this APRN, and it is accurate.    Please note that portions of this note were completed with a voice recognition  program. Efforts were made to edit dictation, but occasionally words are mistranscribed.

## 2021-11-06 DIAGNOSIS — F41.9 ANXIETY DISORDER, UNSPECIFIED TYPE: Chronic | ICD-10-CM

## 2021-11-06 DIAGNOSIS — Z86.59 HISTORY OF POSTTRAUMATIC STRESS DISORDER (PTSD): ICD-10-CM

## 2021-11-06 DIAGNOSIS — F33.1 MAJOR DEPRESSIVE DISORDER, RECURRENT EPISODE, MODERATE (HCC): Chronic | ICD-10-CM

## 2021-11-08 RX ORDER — VENLAFAXINE HYDROCHLORIDE 150 MG/1
150 CAPSULE, EXTENDED RELEASE ORAL DAILY
Qty: 30 CAPSULE | Refills: 0 | Status: SHIPPED | OUTPATIENT
Start: 2021-11-08 | End: 2021-11-15 | Stop reason: SDUPTHER

## 2021-11-08 RX ORDER — LAMOTRIGINE 25 MG/1
50 TABLET ORAL DAILY
Qty: 60 TABLET | Refills: 0 | Status: SHIPPED | OUTPATIENT
Start: 2021-11-08 | End: 2021-11-15 | Stop reason: SDUPTHER

## 2021-11-08 RX ORDER — BUSPIRONE HYDROCHLORIDE 30 MG/1
30 TABLET ORAL 2 TIMES DAILY
Qty: 60 TABLET | Refills: 0 | Status: SHIPPED | OUTPATIENT
Start: 2021-11-08 | End: 2021-11-15 | Stop reason: SDUPTHER

## 2021-11-15 ENCOUNTER — TELEMEDICINE (OUTPATIENT)
Dept: PSYCHIATRY | Facility: CLINIC | Age: 56
End: 2021-11-15

## 2021-11-15 DIAGNOSIS — Z86.59 HISTORY OF POSTTRAUMATIC STRESS DISORDER (PTSD): ICD-10-CM

## 2021-11-15 DIAGNOSIS — F41.9 ANXIETY DISORDER, UNSPECIFIED TYPE: Chronic | ICD-10-CM

## 2021-11-15 DIAGNOSIS — F33.1 MAJOR DEPRESSIVE DISORDER, RECURRENT EPISODE, MODERATE (HCC): Primary | Chronic | ICD-10-CM

## 2021-11-15 DIAGNOSIS — G47.9 SLEEPING DIFFICULTIES: ICD-10-CM

## 2021-11-15 PROCEDURE — 99214 OFFICE O/P EST MOD 30 MIN: CPT | Performed by: NURSE PRACTITIONER

## 2021-11-15 RX ORDER — BUSPIRONE HYDROCHLORIDE 30 MG/1
30 TABLET ORAL 2 TIMES DAILY
Qty: 60 TABLET | Refills: 0 | Status: SHIPPED | OUTPATIENT
Start: 2021-11-15 | End: 2021-12-20 | Stop reason: SDUPTHER

## 2021-11-15 RX ORDER — VENLAFAXINE HYDROCHLORIDE 150 MG/1
150 CAPSULE, EXTENDED RELEASE ORAL DAILY
Qty: 30 CAPSULE | Refills: 0 | Status: SHIPPED | OUTPATIENT
Start: 2021-11-15 | End: 2021-12-20 | Stop reason: SDUPTHER

## 2021-11-15 RX ORDER — LAMOTRIGINE 25 MG/1
50 TABLET ORAL DAILY
Qty: 60 TABLET | Refills: 0 | Status: SHIPPED | OUTPATIENT
Start: 2021-11-15 | End: 2021-12-20 | Stop reason: SDUPTHER
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
## 2021-12-20 ENCOUNTER — TELEMEDICINE (OUTPATIENT)
Dept: PSYCHIATRY | Facility: CLINIC | Age: 56
End: 2021-12-20

## 2021-12-20 DIAGNOSIS — F41.9 ANXIETY DISORDER, UNSPECIFIED TYPE: Chronic | ICD-10-CM

## 2021-12-20 DIAGNOSIS — G47.9 SLEEPING DIFFICULTIES: ICD-10-CM

## 2021-12-20 DIAGNOSIS — Z86.59 HISTORY OF POSTTRAUMATIC STRESS DISORDER (PTSD): ICD-10-CM

## 2021-12-20 DIAGNOSIS — F33.1 MAJOR DEPRESSIVE DISORDER, RECURRENT EPISODE, MODERATE (HCC): Primary | Chronic | ICD-10-CM

## 2021-12-20 PROCEDURE — 99214 OFFICE O/P EST MOD 30 MIN: CPT | Performed by: NURSE PRACTITIONER

## 2021-12-20 RX ORDER — BUSPIRONE HYDROCHLORIDE 30 MG/1
30 TABLET ORAL 2 TIMES DAILY
Qty: 60 TABLET | Refills: 1 | Status: SHIPPED | OUTPATIENT
Start: 2021-12-20 | End: 2022-01-24 | Stop reason: SDUPTHER

## 2021-12-20 RX ORDER — LAMOTRIGINE 25 MG/1
50 TABLET ORAL DAILY
Qty: 60 TABLET | Refills: 1 | Status: SHIPPED | OUTPATIENT
Start: 2021-12-20 | End: 2022-01-24 | Stop reason: SDUPTHER

## 2021-12-20 RX ORDER — VENLAFAXINE HYDROCHLORIDE 150 MG/1
150 CAPSULE, EXTENDED RELEASE ORAL DAILY
Qty: 30 CAPSULE | Refills: 1 | Status: SHIPPED | OUTPATIENT
Start: 2021-12-20 | End: 2022-01-24 | Stop reason: SDUPTHER

## 2021-12-20 NOTE — PROGRESS NOTES
This provider is located at the [facility] (through [facility]), [address], [place] [facility], [id number] using a secure [employer] Video Visit through [employer]. Patient is being seen remotely via telehealth at their home address in [place], and stated they are in a secure environment for this session. The patient's condition being diagnosed/treated is appropriate for telemedicine. The provider identified herself as well as her credentials.   The patient, and/or patients guardian, consent to be seen remotely, and when consent is given they understand that the consent allows for patient identifiable information to be sent to a third party as needed.   They may refuse to be seen remotely at any time. The electronic data is encrypted and password protected, and the patient and/or guardian has been advised of the potential risks to privacy not withstanding such measures.    You have chosen to receive care through a telehealth visit.  Do you consent to use a video/audio connection for your medical care today? Yes        Subjective   Lovely Mixon is a 56 y.o. female who presents today for follow up    Chief Complaint:  Depression, anxiety, and sleeping difficulties    Accompanied by: The patient is interviewed alone at today's encounter    History of Present Illness:   The patient describes her mood as emotional but improved some over the last few weeks.  The patient states the court hearing regarding her divorce ended up in her favor, and she did end up getting the house in the settlement.  The patient states as of January 1 her ex has 60 days to leave the home, but she is stressed and worried that he might try to damage to the house.  The patient states she has been doing good the last little bit as she has been spending more time with her adopted daughter.  The patient states that they went [place] shopping yesterday, and today they are planning to cook dinner together.  The patient  reports overall her symptoms of depression and anxiety are improved, but she still has episodes of depression and anxiety, and reports they are somewhere in the middle of a 0-to-10 scale with 10 being the worst.  The patient reports her appetite as good.  The patient reports her sleep as fair.  The patient states she has had some bad dreams recently involving her sister. The patient denies any new medical problems or changes in medications since last appointment with this facility.  The patient reports compliance with her current medication regimen.  The patient denies any side effects from her current medication regimen.  The patient denies any abnormal muscle movements or tics.   The patient would like to not adjust or change her medications at this visit as she lacks her current medication regimen, and does not want to make any changes.  The patient denies any suicidal or homicidal ideations, plans, or intent at today's encounter and is convincing.  The patient denies any auditory hallucinations or visual hallucinations.  The patient does not endorse any significant symptoms consistent with lea or psychosis at today's encounter.      Primary Care Provider:  Mine Abarca    Prior Psychiatric Medications:  Temazepam 30 mg by mouth once daily at bedtime - states she has taken this since around 2014 for cerebral palsy and also sleep  Zoloft - states was taking 100 mg and she was still having anxiety and mood symptoms  Effexor XR 75 mg by mouth once daily  Buspirone 10 mg by mouth twice daily  Lamictal    Last Menstrual Period:  Uterine ablation on 1/6/21.  Denies any chance of pregnancy.  The patient was educated that her prescribed medications can have potential risk to a developing fetus. The patient is advised to contact this APRN/this office if she becomes pregnant or plans to become pregnant.  Pt verbalizes understanding and acknowledged agreement with this plan in her own words.          The  following portions of the patient's history were reviewed and updated as appropriate: allergies, current medications, past family history, past medical history, past social history, past surgical history and problem list.          Past Medical History:  Past Medical History:   Diagnosis Date   • Anxiety    • Cerebral palsy (HCC)    • Depression    • Lazy eye    • PTSD (post-traumatic stress disorder)    • Seasonal allergies    • Vision decreased        Social History:  Social History     Socioeconomic History   • Marital status: Unknown   Tobacco Use   • Smoking status: Current Every Day Smoker     Packs/day: 0.50   • Smokeless tobacco: Never Used   Substance and Sexual Activity   • Alcohol use: Not Currently     Comment: States an occasional social drink, but not often   • Drug use: Never   • Sexual activity: Not Currently     Partners: Male       Family History:  Family History   Problem Relation Age of Onset   • Heart attack Mother    • Stroke Father    • Alcohol abuse Father    • Anxiety disorder Sister    • Depression Sister    • Alcohol abuse Sister    • Breast cancer Other        Past Surgical History:  Past Surgical History:   Procedure Laterality Date   • CATARACT EXTRACTION     • ENDOMETRIAL ABLATION     • LEG SURGERY      bilateral lower extremity surgeries due to cerebral palsy complications       Problem List:  There is no problem list on file for this patient.      Allergy:   No Known Allergies     Current Medications:   Current Outpatient Medications   Medication Sig Dispense Refill   • busPIRone (BUSPAR) 30 MG tablet Take 1 tablet by mouth 2 (Two) Times a Day. 60 tablet 1   • lamoTRIgine (LaMICtal) 25 MG tablet Take 2 tablets by mouth Daily. 60 tablet 1   • Loratadine 10 MG capsule Take 10 mg by mouth Daily.     • Melatonin 10 MG tablet dispersible Place 10 mg on the tongue every night at bedtime.     • methocarbamol (ROBAXIN) 500 MG tablet Take 500 mg by mouth 3 (Three) Times a Day As Needed for  Muscle Spasms.     • temazepam (RESTORIL) 30 MG capsule Take 30 mg by mouth At Night As Needed for Sleep.     • venlafaxine XR (EFFEXOR-XR) 150 MG 24 hr capsule Take 1 capsule by mouth Daily. 30 capsule 1     No current facility-administered medications for this visit.       Review of Symptoms:    Review of Systems   Constitutional: Positive for activity change and fatigue. Negative for chills, fever, unexpected weight gain and unexpected weight loss.   HENT: Negative.    Eyes: Negative.    Respiratory: Negative.    Cardiovascular: Negative.    Gastrointestinal: Negative.    Endocrine: Negative.    Genitourinary: Negative.    Musculoskeletal: Negative.    Skin: Negative.    Psychiatric/Behavioral: Positive for agitation, decreased concentration, sleep disturbance, depressed mood and stress. Negative for behavioral problems, dysphoric mood, hallucinations, self-injury, suicidal ideas and negative for hyperactivity. The patient is nervous/anxious.          Physical Exam:   There were no vitals taken for this visit. There is no height or weight on file to calculate BMI.   Due to the remote nature of this encounter (virtual encounter), vitals were unable to be obtained.  Height stated at 65 inches.  Weight stated at around 150 pounds.        Physical Exam  Constitutional:       Appearance: She is well-developed.   Neurological:      Mental Status: She is alert and oriented to person, place, and time.   Psychiatric:         Attention and Perception: Attention normal.         Mood and Affect: Affect normal. Mood is anxious.         Speech: Speech normal.         Behavior: Behavior normal. Behavior is cooperative.         Thought Content: Thought content normal. Thought content does not include homicidal or suicidal ideation. Thought content does not include homicidal or suicidal plan.         Cognition and Memory: Cognition and memory normal.         Judgment: Judgment normal.         Mental Status Exam:   Hygiene:    good  Cooperation:  Cooperative  Eye Contact:  Good  Psychomotor Behavior:  Appropriate  Affect:  Appropriate  Mood: anxious  Hopelessness: Denies  Speech:  Normal  Thought Process:  Linear  Thought Content:  Mood congruent  Suicidal:  None  Homicidal:  None  Hallucinations:  None  Delusion:  None  Memory:  Intact  Orientation:  Person, Place, Time and Situation  Reliability:  good  Insight:  Good  Judgement:  Good  Impulse Control:  Good  Physical/Medical Issues:  No            Lab Results:   No visits with results within 1 Month(s) from this visit.   Latest known visit with results is:   No results found for any previous visit.         Assessment/Plan   Problems Addressed this Visit     None      Visit Diagnoses     Major depressive disorder, recurrent episode, moderate (HCC)  (Chronic)   -  Primary    Relevant Medications    busPIRone (BUSPAR) 30 MG tablet    lamoTRIgine (LaMICtal) 25 MG tablet    venlafaxine XR (EFFEXOR-XR) 150 MG 24 hr capsule    Anxiety disorder, unspecified type  (Chronic)       Relevant Medications    busPIRone (BUSPAR) 30 MG tablet    venlafaxine XR (EFFEXOR-XR) 150 MG 24 hr capsule    History of posttraumatic stress disorder (PTSD)        Relevant Medications    venlafaxine XR (EFFEXOR-XR) 150 MG 24 hr capsule    Sleeping difficulties          Diagnoses       Codes Comments    Major depressive disorder, recurrent episode, moderate (HCC)    -  Primary ICD-10-CM: F33.1  ICD-9-CM: 296.32     Anxiety disorder, unspecified type     ICD-10-CM: F41.9  ICD-9-CM: 300.00     History of posttraumatic stress disorder (PTSD)     ICD-10-CM: Z86.59  ICD-9-CM: V11.8     Sleeping difficulties     ICD-10-CM: G47.9  ICD-9-CM: 780.50           Visit Diagnoses:    ICD-10-CM ICD-9-CM   1. Major depressive disorder, recurrent episode, moderate (HCC)  F33.1 296.32   2. Anxiety disorder, unspecified type  F41.9 300.00   3. History of posttraumatic stress disorder (PTSD)  Z86.59 V11.8   4. Sleeping difficulties   G47.9 780.50          GOALS:  Short Term Goals: Patient will be compliant with medication, and patient will have no significant medication related side effects.  Patient will be engaged in psychotherapy as indicated.  Patient will report subjective improvement of symptoms.  Long term goals: To stabilize mood and treat/improve subjective symptoms, the patient will stay out of the hospital, the patient will be at an optimal level of functioning, and the patient will take all medications as prescribed.  The patient verbalized understanding and agreement with goals that were mutually set.      TREATMENT PLAN: Continue supportive psychotherapy efforts and medications as indicated.  Medication and treatment options, both pharmacological and non-pharmacological treatment options, discussed during today's visit, including any off label use of medication. Patient acknowledged and verbally consented with current treatment plan and was educated on the importance of compliance with treatment and follow-up appointments.      - Continue Effexor  mg by mouth once daily in the mornings for mood.  - Continue buspirone 30 mg by mouth two times daily for mood.   - Continue Lamictal 50 mg by mouth once daily as an adjunct for mood.      MEDICATION ISSUES:  Discussed medication options and treatment plan of prescribed medication, any off label use of medication, as well as the risks, benefits, any black box warnings including increased suicidality, and side effects including but not limited to potential falls, dizziness, possible impaired driving, GI side effects (change in appetite, abdominal discomfort, nausea, vomiting, diarrhea, and/or constipation), dry mouth, somnolence, sedation, insomnia, activation, agitation, irritation, tremors, abnormal muscle movements or disorders, headache, sweating, possible bruising or rare bleeding, electrolyte and/or fluid abnormalities, change in blood pressure/heart rate/and or heart rhythm,  sexual dysfunction, and metabolic adversities among others. Patient and/or guardian agreeable to call the office with any worsening of symptoms or onset of side effects, or if any concerns or questions arise.  The contact information for the office is made available to the patient and/or guardian.  Patient and/or guardian agreeable to call 911 or go to the nearest ER should they begin having any SI/HI, or if any urgent concerns arise. No medication side effects or related complaints today.    This APRN has discussed the benefits and risks of taking/continuing Lamictal (Lamotrigine).  The side effects of Lamictal can include a benign rash, blurred or double vision, dizziness, ataxia, sedation, headache, tremor, insomnia, poor coordination, fatigue,  nausea, vomiting, dyspepsia, rhinitis, infection, pharyngitis, asthenia, a rare but serious rash, rare multi-organ failure associated with Pires-Esteban Syndrome, toxic epidermal necrolysis, drug hypersensitivity syndrome, rare blood dyscrasias, rare aseptic meningitis, rare sudden unexplained deaths in people with epilepsy, withdrawal seizures upon abrupt withdrawal, and rare activation of suicidal ideation and behavior (suicidality).  This APRN has discussed that a very slow dose titration when starting, or changing doses, of Lamictal may reduce the incidence of skin rash and other side effects.  The dosage should not be titrated upwards or increased faster than recommended due to the possibility of the discussed side effects and risk of development of a skin rash (which can become life threatening).    This APRN has also discussed that if the patient stops taking the Lamictal for 3-5 days or longer, it will be necessary to restart the drug with an initial dose titration, as rashes have been reported on reexposure.  If the patient and Provider decide to stop the Lamictal, the patient will follow the directions of this APRN/this office as a guided taper over about two  weeks is appropriate due to the risk of relapse in bipolar disorder with those with a mood or bipolar disorder, the risk of seizures in those with epilepsy, and discontinuation symptoms upon rapid discontinuation of Lamictal.    The patient verbalizes understanding of benefits and risks as discussed, the patient/guardian feels the benefits outweigh the risks and is agreeable to continue/take Lamictal as discussed.  The patient is advised should any side effects or rash develops they are to stop the Lamictal immediately and contact this APRN/this office or go to the emergency department immediately.  The patient verbalizes understanding and agreement with treatment plan in their own words.      SUICIDE RISK ASSESSMENT AND SAFETY PLAN: Unalterable demographics and a history of mental health intervention indicate this patient is in a high risk category compared to the general population. At present, the patient denies active SI/HI, intentions, or plans at this time and agrees to seek immediate care should such thoughts develop. The patient verbalizes understanding of how to access emergency care if needed and agrees to do so. Consideration of suicide risk and protective factors such as history, current presentation, individual strengths and weaknesses, psychosocial and environmental stressors and variables, psychiatric illness and symptoms, medical conditions and pain, took place in this interview. Based on those considerations, the patient is determined: within individual baseline and presenting no imminent risk for suicide or homicide. Other recommendations: The patient does not meet the criteria for inpatient admission and is not a safety risk to self or others at today's visit. Inpatient treatment offers no significant advantages over outpatient treatment for this patient at today's visit.  The patient was given ample time for questions and fully participated in treatment planning.  The patient was encouraged to  call the clinic with any questions or concerns.  The patient was informed of access to emergency care. If patient were to develop any significant symptomatology, suicidal ideation, homicidal ideation, any concerns, or feel unsafe at any time they are to call the clinic and if unable to get immediate assistance should immediately call 911 or go to the nearest emergency room.  Patient contracted verbally for the following: If you are experiencing an emotional crisis or have thoughts of harming yourself or others, please go to your nearest local emergency room or call 911. Will continue to re-assess medication response and side effects frequently to establish efficacy and ensure safety. Risks, any black box warnings, side effects, off label usage, and benefits of medication and treatment discussed with patient, along with potential adverse side effects of current and/or newly prescribed medication, alternative treatment options, and OTC medications.  Patient verbalized understanding of potential risks, any off label use of medication, any black box warnings, and any side effects in their own words. The patient verbalized understanding and agreed to comply with the safety plan discussed in their own words.  Patient given the number to the office. Number also discussed of the 24- hour suicide hotline.           MEDS ORDERED DURING VISIT:  New Medications Ordered This Visit   Medications   • busPIRone (BUSPAR) 30 MG tablet     Sig: Take 1 tablet by mouth 2 (Two) Times a Day.     Dispense:  60 tablet     Refill:  1   • lamoTRIgine (LaMICtal) 25 MG tablet     Sig: Take 2 tablets by mouth Daily.     Dispense:  60 tablet     Refill:  1   • venlafaxine XR (EFFEXOR-XR) 150 MG 24 hr capsule     Sig: Take 1 capsule by mouth Daily.     Dispense:  30 capsule     Refill:  1     Return in about 6 weeks (around 1/31/2022), or if symptoms worsen or fail to improve, for Next scheduled follow up and Recheck.         Progress toward goal:  Not at goal    Functional Status: Moderate impairment     Prognosis: Fair with Ongoing Treatment      Treatment plan completed: 2/25/21.            This document has been electronically signed by GISELA Vaughn  December 20, 2021 09:50 EST    Some of the data in this electronic note has been brought forward from a previous encounter, any necessary changes have been made, it has been reviewed by this APRN, and it is accurate.    Please note that portions of this note were completed with a voice recognition program. Efforts were made to edit dictation, but occasionally words are mistranscribed.

## 2022-01-24 DIAGNOSIS — Z86.59 HISTORY OF POSTTRAUMATIC STRESS DISORDER (PTSD): ICD-10-CM

## 2022-01-24 DIAGNOSIS — F41.9 ANXIETY DISORDER, UNSPECIFIED TYPE: Chronic | ICD-10-CM

## 2022-01-24 DIAGNOSIS — F33.1 MAJOR DEPRESSIVE DISORDER, RECURRENT EPISODE, MODERATE: Chronic | ICD-10-CM

## 2022-01-24 RX ORDER — LAMOTRIGINE 25 MG/1
50 TABLET ORAL DAILY
Qty: 60 TABLET | Refills: 0 | Status: SHIPPED | OUTPATIENT
Start: 2022-01-24 | End: 2022-01-31 | Stop reason: SDUPTHER

## 2022-01-24 RX ORDER — VENLAFAXINE HYDROCHLORIDE 150 MG/1
150 CAPSULE, EXTENDED RELEASE ORAL DAILY
Qty: 30 CAPSULE | Refills: 0 | Status: SHIPPED | OUTPATIENT
Start: 2022-01-24 | End: 2022-01-31 | Stop reason: SDUPTHER

## 2022-01-24 RX ORDER — BUSPIRONE HYDROCHLORIDE 30 MG/1
30 TABLET ORAL 2 TIMES DAILY
Qty: 60 TABLET | Refills: 0 | Status: SHIPPED | OUTPATIENT
Start: 2022-01-24 | End: 2022-01-31 | Stop reason: SDUPTHER

## 2022-01-31 ENCOUNTER — TELEMEDICINE (OUTPATIENT)
Dept: PSYCHIATRY | Facility: CLINIC | Age: 57
End: 2022-01-31

## 2022-01-31 DIAGNOSIS — Z86.59 HISTORY OF POSTTRAUMATIC STRESS DISORDER (PTSD): ICD-10-CM

## 2022-01-31 DIAGNOSIS — F33.1 MAJOR DEPRESSIVE DISORDER, RECURRENT EPISODE, MODERATE: Primary | Chronic | ICD-10-CM

## 2022-01-31 DIAGNOSIS — F41.9 ANXIETY DISORDER, UNSPECIFIED TYPE: Chronic | ICD-10-CM

## 2022-01-31 PROCEDURE — 99214 OFFICE O/P EST MOD 30 MIN: CPT | Performed by: NURSE PRACTITIONER

## 2022-01-31 RX ORDER — VENLAFAXINE HYDROCHLORIDE 150 MG/1
150 CAPSULE, EXTENDED RELEASE ORAL DAILY
Qty: 30 CAPSULE | Refills: 0 | Status: SHIPPED | OUTPATIENT
Start: 2022-01-31 | End: 2022-02-24 | Stop reason: SDUPTHER

## 2022-01-31 RX ORDER — BUSPIRONE HYDROCHLORIDE 30 MG/1
30 TABLET ORAL 2 TIMES DAILY
Qty: 60 TABLET | Refills: 0 | Status: SHIPPED | OUTPATIENT
Start: 2022-01-31 | End: 2022-02-24 | Stop reason: SDUPTHER

## 2022-01-31 RX ORDER — LAMOTRIGINE 25 MG/1
50 TABLET ORAL DAILY
Qty: 60 TABLET | Refills: 0 | Status: SHIPPED | OUTPATIENT
Start: 2022-01-31 | End: 2022-02-24 | Stop reason: SDUPTHER

## 2022-01-31 NOTE — PROGRESS NOTES
This provider is located at the [facility] (through [facility]), [address], [place], [address] using a secure [employer] Video Visit through [email or web address]. Patient is being seen remotely via telehealth at their home address in [place], and stated they are in a secure environment for this session. The patient's condition being diagnosed/treated is appropriate for telemedicine. The provider identified herself as well as her credentials.   The patient, and/or patients guardian, consent to be seen remotely, and when consent is given they understand that the consent allows for patient identifiable information to be sent to a third party as needed.   They may refuse to be seen remotely at any time. The electronic data is encrypted and password protected, and the patient and/or guardian has been advised of the potential risks to privacy not withstanding such measures.    You have chosen to receive care through a telehealth visit.  Do you consent to use a video/audio connection for your medical care today? Yes        Subjective   Lovely Mixon is a 57 y.o. female who presents today for follow up    Chief Complaint:  Depression, anxiety, and history of PTSD    Accompanied by: The patient is interviewed alone at today's encounter    History of Present Illness:   The patient describes her mood as emotional over the last few weeks.  The patient states she has some situational things going on right now, but she also has been emotional because an older sister passed away.  She states the one sister she was at odds with - they have made up and are talking now which has been one good thing to happen recently.  The patient is unable to rate her symptoms of depression or anxiety on a 0-10 scale, with 10 being the worst at today's encounter because she reports she has felt so emotional recently, but the patient reports she does feel her medications are helping and working effectively.  The patient  states she has realized that she is probably always going to have excessive worry, and medications will not take that away.  The patient reports her appetite as good.  The patient reports her sleep as good.  The patient denies any new medical problems or changes in medications since last appointment with this facility.  The patient reports compliance with her current medication regimen.  The patient denies any side effects or concerns from her current medication regimen.   The patient would like to not adjust or change her medications at this visit.  The patient denies any suicidal or homicidal ideations, plans, or intent at today's encounter and is convincing.  The patient denies any auditory hallucinations or visual hallucinations.  The patient does not endorse any significant symptoms consistent with ela or psychosis at today's encounter.      Primary Care Provider:  Mine Abarca    Prior Psychiatric Medications:  Temazepam 30 mg by mouth once daily at bedtime - states she has taken this since around 2014 for cerebral palsy and also sleep  Zoloft - states was taking 100 mg and she was still having anxiety and mood symptoms  Effexor XR 75 mg by mouth once daily  Buspirone 10 mg by mouth twice daily  Lamictal      Last Menstrual Period:  Uterine ablation on 1/6/21.  Denies any chance of pregnancy.  The patient was educated that her prescribed medications can have potential risk to a developing fetus. The patient is advised to contact this APRN/this office if she becomes pregnant or plans to become pregnant.  Pt verbalizes understanding and acknowledged agreement with this plan in her own words.          The following portions of the patient's history were reviewed and updated as appropriate: allergies, current medications, past family history, past medical history, past social history, past surgical history and problem list.          Past Medical History:  Past Medical History:   Diagnosis Date   •  Anxiety    • Cerebral palsy (HCC)    • Depression    • Lazy eye    • PTSD (post-traumatic stress disorder)    • Seasonal allergies    • Vision decreased        Social History:  Social History     Socioeconomic History   • Marital status: Unknown   Tobacco Use   • Smoking status: Current Every Day Smoker     Packs/day: 0.50   • Smokeless tobacco: Never Used   Substance and Sexual Activity   • Alcohol use: Not Currently     Comment: States an occasional social drink, but not often   • Drug use: Never   • Sexual activity: Not Currently     Partners: Male       Family History:  Family History   Problem Relation Age of Onset   • Heart attack Mother    • Stroke Father    • Alcohol abuse Father    • Anxiety disorder Sister    • Depression Sister    • Alcohol abuse Sister    • Breast cancer Other        Past Surgical History:  Past Surgical History:   Procedure Laterality Date   • CATARACT EXTRACTION     • ENDOMETRIAL ABLATION     • LEG SURGERY      bilateral lower extremity surgeries due to cerebral palsy complications       Problem List:  There is no problem list on file for this patient.      Allergy:   No Known Allergies     Current Medications:   Current Outpatient Medications   Medication Sig Dispense Refill   • busPIRone (BUSPAR) 30 MG tablet Take 1 tablet by mouth 2 (Two) Times a Day. 60 tablet 0   • lamoTRIgine (LaMICtal) 25 MG tablet Take 2 tablets by mouth Daily. 60 tablet 0   • Loratadine 10 MG capsule Take 10 mg by mouth Daily.     • Melatonin 10 MG tablet dispersible Place 10 mg on the tongue every night at bedtime.     • methocarbamol (ROBAXIN) 500 MG tablet Take 500 mg by mouth 3 (Three) Times a Day As Needed for Muscle Spasms.     • temazepam (RESTORIL) 30 MG capsule Take 30 mg by mouth At Night As Needed for Sleep.     • venlafaxine XR (EFFEXOR-XR) 150 MG 24 hr capsule Take 1 capsule by mouth Daily. 30 capsule 0     No current facility-administered medications for this visit.       Review of Symptoms:     Review of Systems   Constitutional: Positive for fatigue.   HENT: Negative.    Eyes: Negative.    Respiratory: Negative.    Cardiovascular: Negative.    Gastrointestinal: Negative.    Endocrine: Negative.    Genitourinary: Negative.    Musculoskeletal: Negative.    Skin: Negative.    Psychiatric/Behavioral: Positive for depressed mood and stress. Negative for agitation, behavioral problems, decreased concentration, dysphoric mood, hallucinations, self-injury, sleep disturbance, suicidal ideas and negative for hyperactivity. The patient is nervous/anxious.          Physical Exam:   There were no vitals taken for this visit. There is no height or weight on file to calculate BMI.   Due to the remote nature of this encounter (virtual encounter), vitals were unable to be obtained.  Height stated at 65 inches.  Weight stated at around 150 pounds.        Physical Exam  Constitutional:       Appearance: She is well-developed.   Neurological:      Mental Status: She is alert and oriented to person, place, and time.   Psychiatric:         Attention and Perception: Attention normal.         Mood and Affect: Mood is anxious and depressed. Affect is tearful.         Speech: Speech normal.         Behavior: Behavior normal. Behavior is cooperative.         Thought Content: Thought content normal. Thought content does not include homicidal or suicidal ideation. Thought content does not include homicidal or suicidal plan.         Cognition and Memory: Cognition and memory normal.         Judgment: Judgment normal.         Mental Status Exam:   Hygiene:   good  Cooperation:  Cooperative  Eye Contact:  Good  Psychomotor Behavior:  Appropriate  Affect:  Tearful  Mood: depressed and anxious  Hopelessness: Denies  Speech:  Normal  Thought Process:  Linear  Thought Content:  Mood congruent  Suicidal:  None  Homicidal:  None  Hallucinations:  None  Delusion:  None  Memory:  Intact  Orientation:  Person, Place, Time and  Situation  Reliability:  good  Insight:  Good  Judgement:  Good  Impulse Control:  Good  Physical/Medical Issues:  No            Lab Results:   No visits with results within 1 Month(s) from this visit.   Latest known visit with results is:   No results found for any previous visit.         Assessment/Plan   Problems Addressed this Visit     None      Visit Diagnoses     Major depressive disorder, recurrent episode, moderate (HCC)  (Chronic)   -  Primary    Relevant Medications    busPIRone (BUSPAR) 30 MG tablet    lamoTRIgine (LaMICtal) 25 MG tablet    venlafaxine XR (EFFEXOR-XR) 150 MG 24 hr capsule    Anxiety disorder, unspecified type  (Chronic)       Relevant Medications    busPIRone (BUSPAR) 30 MG tablet    venlafaxine XR (EFFEXOR-XR) 150 MG 24 hr capsule    History of posttraumatic stress disorder (PTSD)        Relevant Medications    venlafaxine XR (EFFEXOR-XR) 150 MG 24 hr capsule      Diagnoses       Codes Comments    Major depressive disorder, recurrent episode, moderate (HCC)    -  Primary ICD-10-CM: F33.1  ICD-9-CM: 296.32     Anxiety disorder, unspecified type     ICD-10-CM: F41.9  ICD-9-CM: 300.00     History of posttraumatic stress disorder (PTSD)     ICD-10-CM: Z86.59  ICD-9-CM: V11.8           Visit Diagnoses:    ICD-10-CM ICD-9-CM   1. Major depressive disorder, recurrent episode, moderate (HCC)  F33.1 296.32   2. Anxiety disorder, unspecified type  F41.9 300.00   3. History of posttraumatic stress disorder (PTSD)  Z86.59 V11.8          GOALS:  Short Term Goals: Patient will be compliant with medication, and patient will have no significant medication related side effects.  Patient will be engaged in psychotherapy as indicated.  Patient will report subjective improvement of symptoms.  Long term goals: To stabilize mood and treat/improve subjective symptoms, the patient will stay out of the hospital, the patient will be at an optimal level of functioning, and the patient will take all medications as  prescribed.  The patient verbalized understanding and agreement with goals that were mutually set.      TREATMENT PLAN: Continue supportive psychotherapy efforts and medications as indicated.  Medication and treatment options, both pharmacological and non-pharmacological treatment options, discussed during today's visit, including any off label use of medication. Patient acknowledged and verbally consented with current treatment plan and was educated on the importance of compliance with treatment and follow-up appointments.      - Continue Effexor  mg by mouth once daily in the mornings for mood.  - Continue buspirone 30 mg by mouth two times daily for mood.   - Continue Lamictal 50 mg by mouth once daily as an adjunct for mood.      MEDICATION ISSUES:  Discussed medication options and treatment plan of prescribed medication, any off label use of medication, as well as the risks, benefits, any black box warnings including increased suicidality, and side effects including but not limited to potential falls, dizziness, possible impaired driving, GI side effects (change in appetite, abdominal discomfort, nausea, vomiting, diarrhea, and/or constipation), dry mouth, somnolence, sedation, insomnia, activation, agitation, irritation, tremors, abnormal muscle movements or disorders, headache, sweating, possible bruising or rare bleeding, electrolyte and/or fluid abnormalities, change in blood pressure/heart rate/and or heart rhythm, sexual dysfunction, and metabolic adversities among others. Patient and/or guardian agreeable to call the office with any worsening of symptoms or onset of side effects, or if any concerns or questions arise.  The contact information for the office is made available to the patient and/or guardian.  Patient and/or guardian agreeable to call 911 or go to the nearest ER should they begin having any SI/HI, or if any urgent concerns arise. No medication side effects or related complaints  today.    This APRN has discussed the benefits and risks of taking/continuing Lamictal (Lamotrigine).  The side effects of Lamictal can include a benign rash, blurred or double vision, dizziness, ataxia, sedation, headache, tremor, insomnia, poor coordination, fatigue,  nausea, vomiting, dyspepsia, rhinitis, infection, pharyngitis, asthenia, a rare but serious rash, rare multi-organ failure associated with Pires-Esteban Syndrome, toxic epidermal necrolysis, drug hypersensitivity syndrome, rare blood dyscrasias, rare aseptic meningitis, rare sudden unexplained deaths in people with epilepsy, withdrawal seizures upon abrupt withdrawal, and rare activation of suicidal ideation and behavior (suicidality).  This APRN has discussed that a very slow dose titration when starting, or changing doses, of Lamictal may reduce the incidence of skin rash and other side effects.  The dosage should not be titrated upwards or increased faster than recommended due to the possibility of the discussed side effects and risk of development of a skin rash (which can become life threatening).    This APRN has also discussed that if the patient stops taking the Lamictal for 3-5 days or longer, it will be necessary to restart the drug with an initial dose titration, as rashes have been reported on reexposure.  If the patient and Provider decide to stop the Lamictal, the patient will follow the directions of this APRN/this office as a guided taper over about two weeks is appropriate due to the risk of relapse in bipolar disorder with those with a mood or bipolar disorder, the risk of seizures in those with epilepsy, and discontinuation symptoms upon rapid discontinuation of Lamictal.    The patient verbalizes understanding of benefits and risks as discussed, the patient/guardian feels the benefits outweigh the risks and is agreeable to continue/take Lamictal as discussed.  The patient is advised should any side effects or rash develops they  are to stop the Lamictal immediately and contact this APRN/this office or go to the emergency department immediately.  The patient verbalizes understanding and agreement with treatment plan in their own words.      SUICIDE RISK ASSESSMENT AND SAFETY PLAN: Unalterable demographics and a history of mental health intervention indicate this patient is in a high risk category compared to the general population. At present, the patient denies active SI/HI, intentions, or plans at this time and agrees to seek immediate care should such thoughts develop. The patient verbalizes understanding of how to access emergency care if needed and agrees to do so. Consideration of suicide risk and protective factors such as history, current presentation, individual strengths and weaknesses, psychosocial and environmental stressors and variables, psychiatric illness and symptoms, medical conditions and pain, took place in this interview. Based on those considerations, the patient is determined: within individual baseline and presenting no imminent risk for suicide or homicide. Other recommendations: The patient does not meet the criteria for inpatient admission and is not a safety risk to self or others at today's visit. Inpatient treatment offers no significant advantages over outpatient treatment for this patient at today's visit.  The patient was given ample time for questions and fully participated in treatment planning.  The patient was encouraged to call the clinic with any questions or concerns.  The patient was informed of access to emergency care. If patient were to develop any significant symptomatology, suicidal ideation, homicidal ideation, any concerns, or feel unsafe at any time they are to call the clinic and if unable to get immediate assistance should immediately call 911 or go to the nearest emergency room.  Patient contracted verbally for the following: If you are experiencing an emotional crisis or have thoughts of  harming yourself or others, please go to your nearest local emergency room or call 911. Will continue to re-assess medication response and side effects frequently to establish efficacy and ensure safety. Risks, any black box warnings, side effects, off label usage, and benefits of medication and treatment discussed with patient, along with potential adverse side effects of current and/or newly prescribed medication, alternative treatment options, and OTC medications.  Patient verbalized understanding of potential risks, any off label use of medication, any black box warnings, and any side effects in their own words. The patient verbalized understanding and agreed to comply with the safety plan discussed in their own words.  Patient given the number to the office. Number also discussed of the 24- hour suicide hotline.           MEDS ORDERED DURING VISIT:  New Medications Ordered This Visit   Medications   • busPIRone (BUSPAR) 30 MG tablet     Sig: Take 1 tablet by mouth 2 (Two) Times a Day.     Dispense:  60 tablet     Refill:  0   • lamoTRIgine (LaMICtal) 25 MG tablet     Sig: Take 2 tablets by mouth Daily.     Dispense:  60 tablet     Refill:  0   • venlafaxine XR (EFFEXOR-XR) 150 MG 24 hr capsule     Sig: Take 1 capsule by mouth Daily.     Dispense:  30 capsule     Refill:  0     Return in about 4 weeks (around 2/28/2022), or if symptoms worsen or fail to improve, for Next scheduled follow up and Recheck.         Progress toward goal: Not at goal    Functional Status: Moderate impairment     Prognosis: Fair with Ongoing Treatment      Treatment plan completed: 2/25/21.            This document has been electronically signed by GISELA Vaughn  January 31, 2022 10:36 EST    Some of the data in this electronic note has been brought forward from a previous encounter, any necessary changes have been made, it has been reviewed by this APRN, and it is accurate.    Please note that portions of this note were  completed with a voice recognition program.

## 2022-02-24 DIAGNOSIS — Z86.59 HISTORY OF POSTTRAUMATIC STRESS DISORDER (PTSD): ICD-10-CM

## 2022-02-24 DIAGNOSIS — F33.1 MAJOR DEPRESSIVE DISORDER, RECURRENT EPISODE, MODERATE: Chronic | ICD-10-CM

## 2022-02-24 DIAGNOSIS — F41.9 ANXIETY DISORDER, UNSPECIFIED TYPE: Chronic | ICD-10-CM

## 2022-02-24 RX ORDER — LAMOTRIGINE 25 MG/1
50 TABLET ORAL DAILY
Qty: 60 TABLET | Refills: 0 | Status: SHIPPED | OUTPATIENT
Start: 2022-02-24 | End: 2022-03-03 | Stop reason: SDUPTHER

## 2022-02-24 RX ORDER — BUSPIRONE HYDROCHLORIDE 30 MG/1
30 TABLET ORAL 2 TIMES DAILY
Qty: 60 TABLET | Refills: 0 | Status: SHIPPED | OUTPATIENT
Start: 2022-02-24 | End: 2022-03-03 | Stop reason: SDUPTHER

## 2022-02-24 RX ORDER — VENLAFAXINE HYDROCHLORIDE 150 MG/1
150 CAPSULE, EXTENDED RELEASE ORAL DAILY
Qty: 30 CAPSULE | Refills: 0 | Status: SHIPPED | OUTPATIENT
Start: 2022-02-24 | End: 2022-03-03 | Stop reason: SDUPTHER

## 2022-03-03 ENCOUNTER — TELEMEDICINE (OUTPATIENT)
Dept: PSYCHIATRY | Facility: CLINIC | Age: 57
End: 2022-03-03

## 2022-03-03 DIAGNOSIS — Z86.59 HISTORY OF POSTTRAUMATIC STRESS DISORDER (PTSD): ICD-10-CM

## 2022-03-03 DIAGNOSIS — G47.9 SLEEPING DIFFICULTIES: ICD-10-CM

## 2022-03-03 DIAGNOSIS — F41.9 ANXIETY DISORDER, UNSPECIFIED TYPE: Chronic | ICD-10-CM

## 2022-03-03 DIAGNOSIS — F33.1 MAJOR DEPRESSIVE DISORDER, RECURRENT EPISODE, MODERATE: Primary | Chronic | ICD-10-CM

## 2022-03-03 PROCEDURE — 99214 OFFICE O/P EST MOD 30 MIN: CPT | Performed by: NURSE PRACTITIONER

## 2022-03-03 RX ORDER — LAMOTRIGINE 100 MG/1
100 TABLET ORAL DAILY
Qty: 30 TABLET | Refills: 0 | Status: SHIPPED | OUTPATIENT
Start: 2022-03-03 | End: 2022-03-31 | Stop reason: SDUPTHER

## 2022-03-03 RX ORDER — VENLAFAXINE HYDROCHLORIDE 150 MG/1
150 CAPSULE, EXTENDED RELEASE ORAL DAILY
Qty: 30 CAPSULE | Refills: 0 | Status: SHIPPED | OUTPATIENT
Start: 2022-03-03 | End: 2022-03-31 | Stop reason: SDUPTHER

## 2022-03-03 RX ORDER — BUSPIRONE HYDROCHLORIDE 30 MG/1
30 TABLET ORAL 2 TIMES DAILY
Qty: 60 TABLET | Refills: 0 | Status: SHIPPED | OUTPATIENT
Start: 2022-03-03 | End: 2022-03-31 | Stop reason: SDUPTHER

## 2022-03-03 NOTE — PROGRESS NOTES
This provider is located at the [facility] (formerly [facility]3] (through [facility]), [address], [place], [address] using a secure [employer] Video Visit through [employer]. Patient is being seen remotely via telehealth at their home address in [place], and stated they are in a secure environment for this session. The patient's condition being diagnosed/treated is appropriate for telemedicine. The provider identified herself as well as her credentials.   The patient, and/or patients guardian, consent to be seen remotely, and when consent is given they understand that the consent allows for patient identifiable information to be sent to a third party as needed.   They may refuse to be seen remotely at any time. The electronic data is encrypted and password protected, and the patient and/or guardian has been advised of the potential risks to privacy not withstanding such measures.    You have chosen to receive care through a telehealth visit.  Do you consent to use a video/audio connection for your medical care today? Yes        Subjective   Lovely Mixon is a 57 y.o. female who presents today for follow up    Chief Complaint:  Depression, anxiety, and history of PTSD    Accompanied by: The patient is interviewed alone at today's encounter    History of Present Illness:   The patient describes her mood as both depressed and anxious over the last few weeks.  The patient states some good things have happened since her last encounter with his APRN.  She reports she found out she was approved for disability.  She will be moving into her home that her  is currently occupying around April time.  She does report some significant stressors revolving around her daughter at this time.  The patient rates her symptoms of depression at a 8/10 on a 0-10 scale, with 10 being the worst.  The patient rates her symptoms of anxiety at a 8/10 on a 0-10 scale, with 10 being the worst.  The patient reports her  "appetite as fair.  The patient reports her sleep as fair.  The patient reports she sleeps through the night, but does get up a lot through the night.  The patient reports she knows her psychotropic medications are working for her because she is able to function, and she knows the stress she is currently under without her current psychotropic medications would cause her be \"a basket case\".  The patient denies any new medical problems or changes in medications since last appointment with this facility.  The patient reports compliance with her current medication regimen.  The patient denies any side effects or concerns from her current medication regimen.   The patient would like to adjust her medications at this visit to help with her continued worsened depressive and anxious symptoms.  The patient reports she has had some passive suicidal thoughts such as being better off not here.  The patient reports these thoughts are unwanted, and she is able to push them out of her mind.  The patient reports she wants to live.  The patient reports protective factors against suicide including her children and family.  The patient does verbally contract for safety at today's encounter.  The patient denies any suicidal or homicidal ideations, plans, or intent at today's encounter and is convincing.  The patient denies any auditory hallucinations or visual hallucinations.  The patient does not endorse any significant symptoms consistent with lea or psychosis at today's encounter.      Primary Care Provider:  Mine Abarca    Prior Psychiatric Medications:  Temazepam 30 mg by mouth once daily at bedtime - states she has taken this since around 2014 for cerebral palsy and also sleep  Zoloft - states was taking 100 mg and she was still having anxiety and mood symptoms  Effexor XR 75 mg by mouth once daily  Buspirone 10 mg by mouth twice daily  Lamictal      Last Menstrual Period:  Uterine ablation on 1/6/21.  Denies any " chance of pregnancy.  The patient was educated that her prescribed medications can have potential risk to a developing fetus. The patient is advised to contact this APRN/this office if she becomes pregnant or plans to become pregnant.  Pt verbalizes understanding and acknowledged agreement with this plan in her own words.          The following portions of the patient's history were reviewed and updated as appropriate: allergies, current medications, past family history, past medical history, past social history, past surgical history and problem list.          Past Medical History:  Past Medical History:   Diagnosis Date   • Anxiety    • Cerebral palsy (HCC)    • Depression    • Lazy eye    • PTSD (post-traumatic stress disorder)    • Seasonal allergies    • Vision decreased        Social History:  Social History     Socioeconomic History   • Marital status: Unknown   Tobacco Use   • Smoking status: Current Every Day Smoker     Packs/day: 0.50   • Smokeless tobacco: Never Used   Substance and Sexual Activity   • Alcohol use: Not Currently     Comment: States an occasional social drink, but not often   • Drug use: Never   • Sexual activity: Not Currently     Partners: Male       Family History:  Family History   Problem Relation Age of Onset   • Heart attack Mother    • Stroke Father    • Alcohol abuse Father    • Anxiety disorder Sister    • Depression Sister    • Alcohol abuse Sister    • Breast cancer Other        Past Surgical History:  Past Surgical History:   Procedure Laterality Date   • CATARACT EXTRACTION     • ENDOMETRIAL ABLATION     • LEG SURGERY      bilateral lower extremity surgeries due to cerebral palsy complications       Problem List:  There is no problem list on file for this patient.      Allergy:   No Known Allergies     Current Medications:   Current Outpatient Medications   Medication Sig Dispense Refill   • busPIRone (BUSPAR) 30 MG tablet Take 1 tablet by mouth 2 (Two) Times a Day. 60 tablet  0   • lamoTRIgine (LaMICtal) 100 MG tablet Take 1 tablet by mouth Daily. 30 tablet 0   • Loratadine 10 MG capsule Take 10 mg by mouth Daily.     • Melatonin 10 MG tablet dispersible Place 10 mg on the tongue every night at bedtime.     • methocarbamol (ROBAXIN) 500 MG tablet Take 500 mg by mouth 3 (Three) Times a Day As Needed for Muscle Spasms.     • temazepam (RESTORIL) 30 MG capsule Take 30 mg by mouth At Night As Needed for Sleep.     • venlafaxine XR (EFFEXOR-XR) 150 MG 24 hr capsule Take 1 capsule by mouth Daily. 30 capsule 0     No current facility-administered medications for this visit.       Review of Symptoms:    Review of Systems   Constitutional: Positive for fatigue.   HENT: Negative.    Eyes: Negative.    Respiratory: Negative.    Cardiovascular: Negative.    Gastrointestinal: Negative.    Endocrine: Negative.    Genitourinary: Negative.    Musculoskeletal: Negative.    Skin: Negative.    Psychiatric/Behavioral: Positive for sleep disturbance, depressed mood and stress. Negative for agitation, behavioral problems, decreased concentration, dysphoric mood, hallucinations, self-injury, suicidal ideas and negative for hyperactivity. The patient is nervous/anxious.          Physical Exam:   There were no vitals taken for this visit. There is no height or weight on file to calculate BMI.   Due to the remote nature of this encounter (virtual encounter), vitals were unable to be obtained.  Height stated at 65 inches.  Weight stated at around 150 pounds.        Physical Exam  Constitutional:       Appearance: She is well-developed.   Neurological:      Mental Status: She is alert and oriented to person, place, and time.   Psychiatric:         Attention and Perception: Attention normal. She is attentive.         Mood and Affect: Mood is anxious and depressed.         Speech: Speech normal.         Behavior: Behavior normal. Behavior is cooperative.         Thought Content: Thought content normal. Thought  content does not include homicidal or suicidal ideation. Thought content does not include homicidal or suicidal plan.         Cognition and Memory: Cognition and memory normal.         Judgment: Judgment normal.         Mental Status Exam:   Hygiene:   good  Cooperation:  Cooperative  Eye Contact:  Good  Psychomotor Behavior:  Appropriate  Affect:  Appropriate  Mood: depressed and anxious  Hopelessness: Denies  Speech:  Normal  Thought Process:  Linear  Thought Content:  Mood congruent  Suicidal:  None  Homicidal:  None  Hallucinations:  None  Delusion:  None  Memory:  Intact  Orientation:  Person, Place, Time and Situation  Reliability:  good  Insight:  Good  Judgement:  Good  Impulse Control:  Good  Physical/Medical Issues:  No            Lab Results:   No visits with results within 1 Month(s) from this visit.   Latest known visit with results is:   No results found for any previous visit.         Assessment/Plan   Problems Addressed this Visit     None      Visit Diagnoses     Major depressive disorder, recurrent episode, moderate (HCC)  (Chronic)   -  Primary    Relevant Medications    venlafaxine XR (EFFEXOR-XR) 150 MG 24 hr capsule    lamoTRIgine (LaMICtal) 100 MG tablet    busPIRone (BUSPAR) 30 MG tablet    Anxiety disorder, unspecified type  (Chronic)       Relevant Medications    venlafaxine XR (EFFEXOR-XR) 150 MG 24 hr capsule    busPIRone (BUSPAR) 30 MG tablet    History of posttraumatic stress disorder (PTSD)        Relevant Medications    venlafaxine XR (EFFEXOR-XR) 150 MG 24 hr capsule    Sleeping difficulties          Diagnoses       Codes Comments    Major depressive disorder, recurrent episode, moderate (HCC)    -  Primary ICD-10-CM: F33.1  ICD-9-CM: 296.32     Anxiety disorder, unspecified type     ICD-10-CM: F41.9  ICD-9-CM: 300.00     History of posttraumatic stress disorder (PTSD)     ICD-10-CM: Z86.59  ICD-9-CM: V11.8     Sleeping difficulties     ICD-10-CM: G47.9  ICD-9-CM: 780.50            Visit Diagnoses:    ICD-10-CM ICD-9-CM   1. Major depressive disorder, recurrent episode, moderate (HCC)  F33.1 296.32   2. Anxiety disorder, unspecified type  F41.9 300.00   3. History of posttraumatic stress disorder (PTSD)  Z86.59 V11.8   4. Sleeping difficulties  G47.9 780.50          GOALS:  Short Term Goals: Patient will be compliant with medication, and patient will have no significant medication related side effects.  Patient will be engaged in psychotherapy as indicated.  Patient will report subjective improvement of symptoms.  Long term goals: To stabilize mood and treat/improve subjective symptoms, the patient will stay out of the hospital, the patient will be at an optimal level of functioning, and the patient will take all medications as prescribed.  The patient verbalized understanding and agreement with goals that were mutually set.      TREATMENT PLAN: Continue supportive psychotherapy efforts and medications as indicated.  Medication and treatment options, both pharmacological and non-pharmacological treatment options, discussed during today's visit, including any off label use of medication. Patient acknowledged and verbally consented with current treatment plan and was educated on the importance of compliance with treatment and follow-up appointments.      - Continue Effexor  mg by mouth once daily in the mornings for mood.  - Continue buspirone 30 mg by mouth two times daily for mood.   - Increase Lamictal to 100 mg by mouth once daily as an adjunct for mood.      MEDICATION ISSUES:  Discussed medication options and treatment plan of prescribed medication, any off label use of medication, as well as the risks, benefits, any black box warnings including increased suicidality, and side effects including but not limited to potential falls, dizziness, possible impaired driving, GI side effects (change in appetite, abdominal discomfort, nausea, vomiting, diarrhea, and/or constipation), dry  mouth, somnolence, sedation, insomnia, activation, agitation, irritation, tremors, abnormal muscle movements or disorders, headache, sweating, possible bruising or rare bleeding, electrolyte and/or fluid abnormalities, change in blood pressure/heart rate/and or heart rhythm, sexual dysfunction, and metabolic adversities among others. Patient and/or guardian agreeable to call the office with any worsening of symptoms or onset of side effects, or if any concerns or questions arise.  The contact information for the office is made available to the patient and/or guardian.  Patient and/or guardian agreeable to call 911 or go to the nearest ER should they begin having any SI/HI, or if any urgent concerns arise. No medication side effects or related complaints today.    This APRN has discussed the benefits and risks of taking/continuing Lamictal (Lamotrigine).  The side effects of Lamictal can include a benign rash, blurred or double vision, dizziness, ataxia, sedation, headache, tremor, insomnia, poor coordination, fatigue,  nausea, vomiting, dyspepsia, rhinitis, infection, pharyngitis, asthenia, a rare but serious rash, rare multi-organ failure associated with Pires-Esteban Syndrome, toxic epidermal necrolysis, drug hypersensitivity syndrome, rare blood dyscrasias, rare aseptic meningitis, rare sudden unexplained deaths in people with epilepsy, withdrawal seizures upon abrupt withdrawal, and rare activation of suicidal ideation and behavior (suicidality).  This APRN has discussed that a very slow dose titration when starting, or changing doses, of Lamictal may reduce the incidence of skin rash and other side effects.  The dosage should not be titrated upwards or increased faster than recommended due to the possibility of the discussed side effects and risk of development of a skin rash (which can become life threatening).    This APRN has also discussed that if the patient stops taking the Lamictal for 3-5 days or  longer, it will be necessary to restart the drug with an initial dose titration, as rashes have been reported on reexposure.  If the patient and Provider decide to stop the Lamictal, the patient will follow the directions of this APRN/this office as a guided taper over about two weeks is appropriate due to the risk of relapse in bipolar disorder with those with a mood or bipolar disorder, the risk of seizures in those with epilepsy, and discontinuation symptoms upon rapid discontinuation of Lamictal.    The patient verbalizes understanding of benefits and risks as discussed, the patient/guardian feels the benefits outweigh the risks and is agreeable to continue/take Lamictal as discussed.  The patient is advised should any side effects or rash develops they are to stop the Lamictal immediately and contact this APRN/this office or go to the emergency department immediately.  The patient verbalizes understanding and agreement with treatment plan in their own words.      SUICIDE RISK ASSESSMENT AND SAFETY PLAN: Unalterable demographics and a history of mental health intervention indicate this patient is in a high risk category compared to the general population. At present, the patient denies active SI/HI, intentions, or plans at this time and agrees to seek immediate care should such thoughts develop. The patient verbalizes understanding of how to access emergency care if needed and agrees to do so. Consideration of suicide risk and protective factors such as history, current presentation, individual strengths and weaknesses, psychosocial and environmental stressors and variables, psychiatric illness and symptoms, medical conditions and pain, took place in this interview. Based on those considerations, the patient is determined: within individual baseline and presenting no imminent risk for suicide or homicide. Other recommendations: The patient does not meet the criteria for inpatient admission and is not a safety  risk to self or others at today's visit. Inpatient treatment offers no significant advantages over outpatient treatment for this patient at today's visit.  The patient was given ample time for questions and fully participated in treatment planning.  The patient was encouraged to call the clinic with any questions or concerns.  The patient was informed of access to emergency care. If patient were to develop any significant symptomatology, suicidal ideation, homicidal ideation, any concerns, or feel unsafe at any time they are to call the clinic and if unable to get immediate assistance should immediately call 911 or go to the nearest emergency room.  Patient contracted verbally for the following: If you are experiencing an emotional crisis or have thoughts of harming yourself or others, please go to your nearest local emergency room or call 911. Will continue to re-assess medication response and side effects frequently to establish efficacy and ensure safety. Risks, any black box warnings, side effects, off label usage, and benefits of medication and treatment discussed with patient, along with potential adverse side effects of current and/or newly prescribed medication, alternative treatment options, and OTC medications.  Patient verbalized understanding of potential risks, any off label use of medication, any black box warnings, and any side effects in their own words. The patient verbalized understanding and agreed to comply with the safety plan discussed in their own words.  Patient given the number to the office. Number also discussed of the 24- hour suicide hotline.           MEDS ORDERED DURING VISIT:  New Medications Ordered This Visit   Medications   • venlafaxine XR (EFFEXOR-XR) 150 MG 24 hr capsule     Sig: Take 1 capsule by mouth Daily.     Dispense:  30 capsule     Refill:  0   • lamoTRIgine (LaMICtal) 100 MG tablet     Sig: Take 1 tablet by mouth Daily.     Dispense:  30 tablet     Refill:  0   •  busPIRone (BUSPAR) 30 MG tablet     Sig: Take 1 tablet by mouth 2 (Two) Times a Day.     Dispense:  60 tablet     Refill:  0     Return in about 4 weeks (around 3/31/2022), or if symptoms worsen or fail to improve, for Next scheduled follow up and Recheck.         Progress toward goal: Not at goal    Functional Status: Moderate impairment     Prognosis: Fair with Ongoing Treatment      Treatment plan completed: 2/25/21.            This document has been electronically signed by GISELA Vaughn  March 3, 2022 09:32 EST    Some of the data in this electronic note has been brought forward from a previous encounter, any necessary changes have been made, it has been reviewed by this APRN, and it is accurate.    Please note that portions of this note were completed with a voice recognition program.

## 2022-03-31 ENCOUNTER — TELEMEDICINE (OUTPATIENT)
Dept: PSYCHIATRY | Facility: CLINIC | Age: 57
End: 2022-03-31

## 2022-03-31 DIAGNOSIS — G47.9 SLEEPING DIFFICULTIES: ICD-10-CM

## 2022-03-31 DIAGNOSIS — Z86.59 HISTORY OF POSTTRAUMATIC STRESS DISORDER (PTSD): ICD-10-CM

## 2022-03-31 DIAGNOSIS — F33.1 MAJOR DEPRESSIVE DISORDER, RECURRENT EPISODE, MODERATE: Primary | Chronic | ICD-10-CM

## 2022-03-31 DIAGNOSIS — F41.9 ANXIETY DISORDER, UNSPECIFIED TYPE: Chronic | ICD-10-CM

## 2022-03-31 PROCEDURE — 99214 OFFICE O/P EST MOD 30 MIN: CPT | Performed by: NURSE PRACTITIONER

## 2022-03-31 RX ORDER — VENLAFAXINE HYDROCHLORIDE 150 MG/1
150 CAPSULE, EXTENDED RELEASE ORAL DAILY
Qty: 30 CAPSULE | Refills: 0 | Status: SHIPPED | OUTPATIENT
Start: 2022-03-31 | End: 2022-04-28 | Stop reason: SDUPTHER

## 2022-03-31 RX ORDER — LAMOTRIGINE 150 MG/1
150 TABLET ORAL DAILY
Qty: 30 TABLET | Refills: 0 | Status: SHIPPED | OUTPATIENT
Start: 2022-03-31 | End: 2022-04-08 | Stop reason: SDUPTHER

## 2022-03-31 RX ORDER — BUSPIRONE HYDROCHLORIDE 30 MG/1
30 TABLET ORAL 2 TIMES DAILY
Qty: 60 TABLET | Refills: 0 | Status: SHIPPED | OUTPATIENT
Start: 2022-03-31 | End: 2022-04-28 | Stop reason: SDUPTHER

## 2022-03-31 NOTE — PROGRESS NOTES
This provider is located at the [facility] (through [facility]), [address], [facility], [address] using a secure [employer] Video Visit through [employer]. Patient is being seen remotely via telehealth at their home address in [place], and stated they are in a secure environment for this session. The patient's condition being diagnosed/treated is appropriate for telemedicine. The provider identified herself as well as her credentials.   The patient, and/or patients guardian, consent to be seen remotely, and when consent is given they understand that the consent allows for patient identifiable information to be sent to a third party as needed.   They may refuse to be seen remotely at any time. The electronic data is encrypted and password protected, and the patient and/or guardian has been advised of the potential risks to privacy not withstanding such measures.    You have chosen to receive care through a telehealth visit.  Do you consent to use a video/audio connection for your medical care today? Yes        Subjective   Lovely Mixon is a 57 y.o. female who presents today for follow up    Chief Complaint:  Depression, anxiety, and history of PTSD    Accompanied by: The patient is interviewed alone at today's encounter    History of Present Illness:   The patient describes her mood as about the same since her last encounter with this APRN.  The patient states her brother recently passed away unexpectedly, and that has been very hard for her.  She reports she is very concerned about her daughters behaviors, and is worried she may have to give up custody of her daughter.  The patient rates her symptoms of depression at a 10/10 on a 0-10 scale, with 10 being the worst.  The patient rates her symptoms of anxiety at a 10/10 on a 0-10 scale, with 10 being the worst.  The patient reports her appetite as fluctuating.  The patient reports her sleep as fluctuating.  The patient denies  any new medical problems or changes in medications since last appointment with this facility.  The patient reports compliance with her current medication regimen.  The patient denies any side effects or concerns from her current medication regimen.   The patient would like to adjust her medications at this visit to help with her worsened moods and depressive symptoms.  The patient states she has had thoughts of being better off not here, but these thoughts are unwanted.  The patient is able to list protective factors against suicide.  The patient denies any suicidal or homicidal ideations, plans, or intent at today's encounter and is convincing.  The patient denies any auditory hallucinations or visual hallucinations.  The patient does not endorse any significant symptoms consistent with lea or psychosis at today's encounter.      Primary Care Provider:  Mine Abarca    Prior Psychiatric Medications:  Temazepam 30 mg by mouth once daily at bedtime - states she has taken this since around 2014 for cerebral palsy and also sleep  Zoloft - states was taking 100 mg and she was still having anxiety and mood symptoms  Effexor XR 75 mg by mouth once daily  Buspirone 10 mg by mouth twice daily  Lamictal      Last Menstrual Period:  Uterine ablation on 1/6/21.  Denies any chance of pregnancy.  The patient was educated that her prescribed medications can have potential risk to a developing fetus. The patient is advised to contact this APRN/this office if she becomes pregnant or plans to become pregnant.  Pt verbalizes understanding and acknowledged agreement with this plan in her own words.          The following portions of the patient's history were reviewed and updated as appropriate: allergies, current medications, past family history, past medical history, past social history, past surgical history and problem list.          Past Medical History:  Past Medical History:   Diagnosis Date   • Anxiety    • Cerebral  palsy (HCC)    • Depression    • Lazy eye    • PTSD (post-traumatic stress disorder)    • Seasonal allergies    • Vision decreased        Social History:  Social History     Socioeconomic History   • Marital status: Unknown   Tobacco Use   • Smoking status: Current Every Day Smoker     Packs/day: 0.50   • Smokeless tobacco: Never Used   Substance and Sexual Activity   • Alcohol use: Not Currently     Comment: States an occasional social drink, but not often   • Drug use: Never   • Sexual activity: Not Currently     Partners: Male       Family History:  Family History   Problem Relation Age of Onset   • Heart attack Mother    • Stroke Father    • Alcohol abuse Father    • Anxiety disorder Sister    • Depression Sister    • Alcohol abuse Sister    • Breast cancer Other        Past Surgical History:  Past Surgical History:   Procedure Laterality Date   • CATARACT EXTRACTION     • ENDOMETRIAL ABLATION     • LEG SURGERY      bilateral lower extremity surgeries due to cerebral palsy complications       Problem List:  There is no problem list on file for this patient.      Allergy:   No Known Allergies     Current Medications:   Current Outpatient Medications   Medication Sig Dispense Refill   • busPIRone (BUSPAR) 30 MG tablet Take 1 tablet by mouth 2 (Two) Times a Day. 60 tablet 0   • lamoTRIgine (LaMICtal) 150 MG tablet Take 1 tablet by mouth Daily. 30 tablet 0   • venlafaxine XR (EFFEXOR-XR) 150 MG 24 hr capsule Take 1 capsule by mouth Daily. 30 capsule 0   • Loratadine 10 MG capsule Take 10 mg by mouth Daily.     • Melatonin 10 MG tablet dispersible Place 10 mg on the tongue every night at bedtime.     • methocarbamol (ROBAXIN) 500 MG tablet Take 500 mg by mouth 3 (Three) Times a Day As Needed for Muscle Spasms.     • temazepam (RESTORIL) 30 MG capsule Take 30 mg by mouth At Night As Needed for Sleep.       No current facility-administered medications for this visit.       Review of Symptoms:    Review of Systems    Constitutional: Positive for activity change, appetite change and fatigue.   HENT: Negative.    Eyes: Negative.    Respiratory: Negative.    Cardiovascular: Negative.    Gastrointestinal: Negative.    Endocrine: Negative.    Genitourinary: Negative.    Musculoskeletal: Negative.    Skin: Negative.    Psychiatric/Behavioral: Positive for agitation, decreased concentration, sleep disturbance, depressed mood and stress. Negative for behavioral problems, dysphoric mood, hallucinations, self-injury, suicidal ideas and negative for hyperactivity. The patient is nervous/anxious.          Physical Exam:   There were no vitals taken for this visit. There is no height or weight on file to calculate BMI.   Due to the remote nature of this encounter (virtual encounter), vitals were unable to be obtained.  Height stated at 65 inches.  Weight stated at around 150 pounds.        Physical Exam  Constitutional:       Appearance: She is well-developed.   Neurological:      Mental Status: She is alert and oriented to person, place, and time.   Psychiatric:         Attention and Perception: Attention normal. She is attentive.         Mood and Affect: Mood is depressed. Affect is tearful.         Speech: Speech normal.         Behavior: Behavior normal. Behavior is cooperative.         Thought Content: Thought content normal. Thought content is not paranoid or delusional. Thought content does not include homicidal or suicidal ideation. Thought content does not include homicidal or suicidal plan.         Cognition and Memory: Cognition and memory normal.         Judgment: Judgment normal.         Mental Status Exam:   Hygiene:   good  Cooperation:  Cooperative  Eye Contact:  Good  Psychomotor Behavior:  Appropriate  Affect:  Tearful  Mood: depressed  Hopelessness: Denies  Speech:  Normal  Thought Process:  Linear  Thought Content:  Mood congruent  Suicidal:  None  Homicidal:  None  Hallucinations:  None  Delusion:  None  Memory:   Intact  Orientation:  Person, Place, Time and Situation  Reliability:  good  Insight:  Good  Judgement:  Good  Impulse Control:  Good  Physical/Medical Issues:  No            Lab Results:   No visits with results within 1 Month(s) from this visit.   Latest known visit with results is:   No results found for any previous visit.         Assessment/Plan   Problems Addressed this Visit    None     Visit Diagnoses     Major depressive disorder, recurrent episode, moderate (HCC)  (Chronic)   -  Primary    Relevant Medications    busPIRone (BUSPAR) 30 MG tablet    lamoTRIgine (LaMICtal) 150 MG tablet    venlafaxine XR (EFFEXOR-XR) 150 MG 24 hr capsule    Anxiety disorder, unspecified type  (Chronic)       Relevant Medications    busPIRone (BUSPAR) 30 MG tablet    venlafaxine XR (EFFEXOR-XR) 150 MG 24 hr capsule    History of posttraumatic stress disorder (PTSD)        Relevant Medications    venlafaxine XR (EFFEXOR-XR) 150 MG 24 hr capsule    Sleeping difficulties          Diagnoses       Codes Comments    Major depressive disorder, recurrent episode, moderate (HCC)    -  Primary ICD-10-CM: F33.1  ICD-9-CM: 296.32     Anxiety disorder, unspecified type     ICD-10-CM: F41.9  ICD-9-CM: 300.00     History of posttraumatic stress disorder (PTSD)     ICD-10-CM: Z86.59  ICD-9-CM: V11.8     Sleeping difficulties     ICD-10-CM: G47.9  ICD-9-CM: 780.50           Visit Diagnoses:    ICD-10-CM ICD-9-CM   1. Major depressive disorder, recurrent episode, moderate (HCC)  F33.1 296.32   2. Anxiety disorder, unspecified type  F41.9 300.00   3. History of posttraumatic stress disorder (PTSD)  Z86.59 V11.8   4. Sleeping difficulties  G47.9 780.50          GOALS:  Short Term Goals: Patient will be compliant with medication, and patient will have no significant medication related side effects.  Patient will be engaged in psychotherapy as indicated.  Patient will report subjective improvement of symptoms.  Long term goals: To stabilize mood  and treat/improve subjective symptoms, the patient will stay out of the hospital, the patient will be at an optimal level of functioning, and the patient will take all medications as prescribed.  The patient verbalized understanding and agreement with goals that were mutually set.      TREATMENT PLAN: Continue supportive psychotherapy efforts and medications as indicated.  Medication and treatment options, both pharmacological and non-pharmacological treatment options, discussed during today's visit, including any off label use of medication. Patient acknowledged and verbally consented with current treatment plan and was educated on the importance of compliance with treatment and follow-up appointments.      - Continue Effexor  mg by mouth once daily in the mornings for mood.  - Continue buspirone 30 mg by mouth two times daily for mood.   - Increase Lamictal to 150 mg by mouth once daily as an adjunct for mood.      MEDICATION ISSUES:  Discussed medication options and treatment plan of prescribed medication, any off label use of medication, as well as the risks, benefits, any black box warnings including increased suicidality, and side effects including but not limited to potential falls, dizziness, possible impaired driving, GI side effects (change in appetite, abdominal discomfort, nausea, vomiting, diarrhea, and/or constipation), dry mouth, somnolence, sedation, insomnia, activation, agitation, irritation, tremors, abnormal muscle movements or disorders, headache, sweating, possible bruising or rare bleeding, electrolyte and/or fluid abnormalities, change in blood pressure/heart rate/and or heart rhythm, sexual dysfunction, and metabolic adversities among others. Patient and/or guardian agreeable to call the office with any worsening of symptoms or onset of side effects, or if any concerns or questions arise.  The contact information for the office is made available to the patient and/or guardian.  Patient  and/or guardian agreeable to call 911 or go to the nearest ER should they begin having any SI/HI, or if any urgent concerns arise. No medication side effects or related complaints today.    This APRN has discussed the benefits and risks of taking/continuing Lamictal (Lamotrigine).  The side effects of Lamictal can include a benign rash, blurred or double vision, dizziness, ataxia, sedation, headache, tremor, insomnia, poor coordination, fatigue,  nausea, vomiting, dyspepsia, rhinitis, infection, pharyngitis, asthenia, a rare but serious rash, rare multi-organ failure associated with Pires-Esteban Syndrome, toxic epidermal necrolysis, drug hypersensitivity syndrome, rare blood dyscrasias, rare aseptic meningitis, rare sudden unexplained deaths in people with epilepsy, withdrawal seizures upon abrupt withdrawal, and rare activation of suicidal ideation and behavior (suicidality).  This APRN has discussed that a very slow dose titration when starting, or changing doses, of Lamictal may reduce the incidence of skin rash and other side effects.  The dosage should not be titrated upwards or increased faster than recommended due to the possibility of the discussed side effects and risk of development of a skin rash (which can become life threatening).    This APRN has also discussed that if the patient stops taking the Lamictal for 3-5 days or longer, it will be necessary to restart the drug with an initial dose titration, as rashes have been reported on reexposure.  If the patient and Provider decide to stop the Lamictal, the patient will follow the directions of this APRN/this office as a guided taper over about two weeks is appropriate due to the risk of relapse in bipolar disorder with those with a mood or bipolar disorder, the risk of seizures in those with epilepsy, and discontinuation symptoms upon rapid discontinuation of Lamictal.    The patient verbalizes understanding of benefits and risks as discussed, the  patient/guardian feels the benefits outweigh the risks and is agreeable to continue/take Lamictal as discussed.  The patient is advised should any side effects or rash develops they are to stop the Lamictal immediately and contact this APRN/this office or go to the emergency department immediately.  The patient verbalizes understanding and agreement with treatment plan in their own words.      SUICIDE RISK ASSESSMENT AND SAFETY PLAN: Unalterable demographics and a history of mental health intervention indicate this patient is in a high risk category compared to the general population. At present, the patient denies active SI/HI, intentions, or plans at this time and agrees to seek immediate care should such thoughts develop. The patient verbalizes understanding of how to access emergency care if needed and agrees to do so. Consideration of suicide risk and protective factors such as history, current presentation, individual strengths and weaknesses, psychosocial and environmental stressors and variables, psychiatric illness and symptoms, medical conditions and pain, took place in this interview. Based on those considerations, the patient is determined: within individual baseline and presenting no imminent risk for suicide or homicide. Other recommendations: The patient does not meet the criteria for inpatient admission and is not a safety risk to self or others at today's visit. Inpatient treatment offers no significant advantages over outpatient treatment for this patient at today's visit.  The patient was given ample time for questions and fully participated in treatment planning.  The patient was encouraged to call the clinic with any questions or concerns.  The patient was informed of access to emergency care. If patient were to develop any significant symptomatology, suicidal ideation, homicidal ideation, any concerns, or feel unsafe at any time they are to call the clinic and if unable to get immediate  assistance should immediately call 911 or go to the nearest emergency room.  Patient contracted verbally for the following: If you are experiencing an emotional crisis or have thoughts of harming yourself or others, please go to your nearest local emergency room or call 911. Will continue to re-assess medication response and side effects frequently to establish efficacy and ensure safety. Risks, any black box warnings, side effects, off label usage, and benefits of medication and treatment discussed with patient, along with potential adverse side effects of current and/or newly prescribed medication, alternative treatment options, and OTC medications.  Patient verbalized understanding of potential risks, any off label use of medication, any black box warnings, and any side effects in their own words. The patient verbalized understanding and agreed to comply with the safety plan discussed in their own words.  Patient given the number to the office. Number also discussed of the 24- hour suicide hotline.           MEDS ORDERED DURING VISIT:  New Medications Ordered This Visit   Medications   • busPIRone (BUSPAR) 30 MG tablet     Sig: Take 1 tablet by mouth 2 (Two) Times a Day.     Dispense:  60 tablet     Refill:  0   • lamoTRIgine (LaMICtal) 150 MG tablet     Sig: Take 1 tablet by mouth Daily.     Dispense:  30 tablet     Refill:  0   • venlafaxine XR (EFFEXOR-XR) 150 MG 24 hr capsule     Sig: Take 1 capsule by mouth Daily.     Dispense:  30 capsule     Refill:  0     Return in about 4 weeks (around 4/28/2022), or if symptoms worsen or fail to improve, for Next scheduled follow up and Recheck.         Progress toward goal: Not at goal    Functional Status: Moderate impairment     Prognosis: Fair with Ongoing Treatment      Treatment plan completed: 2/25/21.            This document has been electronically signed by GISELA Vaughn  March 31, 2022 10:03 EDT    Some of the data in this electronic note has  been brought forward from a previous encounter, any necessary changes have been made, it has been reviewed by this APRN, and it is accurate.    Please note that portions of this note were completed with a voice recognition program.

## 2022-04-08 DIAGNOSIS — F33.1 MAJOR DEPRESSIVE DISORDER, RECURRENT EPISODE, MODERATE: Chronic | ICD-10-CM
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
## 2022-04-11 RX ORDER — LAMOTRIGINE 150 MG/1
150 TABLET ORAL DAILY
Qty: 30 TABLET | Refills: 0 | Status: SHIPPED | OUTPATIENT
Start: 2022-04-11 | End: 2022-04-28 | Stop reason: SDUPTHER

## 2022-04-28 ENCOUNTER — TELEMEDICINE (OUTPATIENT)
Dept: PSYCHIATRY | Facility: CLINIC | Age: 57
End: 2022-04-28

## 2022-04-28 DIAGNOSIS — F41.9 ANXIETY DISORDER, UNSPECIFIED TYPE: Chronic | ICD-10-CM

## 2022-04-28 DIAGNOSIS — Z86.59 HISTORY OF POSTTRAUMATIC STRESS DISORDER (PTSD): ICD-10-CM

## 2022-04-28 DIAGNOSIS — G47.9 SLEEPING DIFFICULTIES: ICD-10-CM

## 2022-04-28 DIAGNOSIS — F33.1 MAJOR DEPRESSIVE DISORDER, RECURRENT EPISODE, MODERATE: Primary | Chronic | ICD-10-CM

## 2022-04-28 PROCEDURE — 99214 OFFICE O/P EST MOD 30 MIN: CPT | Performed by: NURSE PRACTITIONER

## 2022-04-28 RX ORDER — LAMOTRIGINE 150 MG/1
150 TABLET ORAL DAILY
Qty: 30 TABLET | Refills: 0 | Status: SHIPPED | OUTPATIENT
Start: 2022-04-28 | End: 2022-05-19 | Stop reason: SDUPTHER

## 2022-04-28 RX ORDER — VENLAFAXINE HYDROCHLORIDE 150 MG/1
150 CAPSULE, EXTENDED RELEASE ORAL DAILY
Qty: 30 CAPSULE | Refills: 0 | Status: SHIPPED | OUTPATIENT
Start: 2022-04-28 | End: 2022-05-26 | Stop reason: SDUPTHER

## 2022-04-28 RX ORDER — BUSPIRONE HYDROCHLORIDE 30 MG/1
30 TABLET ORAL 2 TIMES DAILY
Qty: 60 TABLET | Refills: 0 | Status: SHIPPED | OUTPATIENT
Start: 2022-04-28 | End: 2022-05-26 | Stop reason: SDUPTHER

## 2022-04-28 NOTE — PROGRESS NOTES
This provider is located at the [facility] (through [facility]), [address], [facility], [address] using a secure [employer] Video Visit through [employer]. Patient is being seen remotely via telehealth at their home address in [place], and stated they are in a secure environment for this session. The patient's condition being diagnosed/treated is appropriate for telemedicine. The provider identified herself as well as her credentials.   The patient, and/or patients guardian, consent to be seen remotely, and when consent is given they understand that the consent allows for patient identifiable information to be sent to a third party as needed.   They may refuse to be seen remotely at any time. The electronic data is encrypted and password protected, and the patient and/or guardian has been advised of the potential risks to privacy not withstanding such measures.    You have chosen to receive care through a telehealth visit.  Do you consent to use a video/audio connection for your medical care today? Yes        Subjective   Lovely Mixon is a 57 y.o. female who presents today for follow up    Chief Complaint:  Depression, anxiety, history of sleeping difficulties, and history of PTSD    Accompanied by: The patient is interviewed alone at today's encounter    History of Present Illness:   The patient describes her mood as doing okay and at her typical baseline at this time.  The patient states she has finally been able to move back into her home, that has been good.  The patient reports her ex- did not clean the house for a year and a half so she has a lot of work to do, and there has been evidence of mice which she is trying to deal with, but this has been a good thing for her.  The patient reports both her symptoms of depression and anxiety as controlled on her current medication regimen, and not as high/decreased since her last encounter with this APRN.  The patient  reports her appetite as fair.  The patient reports her sleep as fair.  The patient reports a wound on her hand, which she thinks is from a spider bite, which is being treated by her PCP office.  The patient denies any other new medical problems or changes in medications since last appointment with this facility.  The patient reports compliance with her current medication regimen.  The patient does not report any side effects or concerns from her current medication regimen.   The patient would like to not adjust or change her medications at this visit.  The patient denies any suicidal or homicidal ideations, plans, or intent at today's encounter and is convincing.  The patient denies any auditory hallucinations or visual hallucinations.  The patient does not endorse any significant symptoms consistent with lea or psychosis at today's encounter.      Primary Care Provider:  Mine Abarca    Prior Psychiatric Medications:  Temazepam 30 mg by mouth once daily at bedtime - states she has taken this since around 2014 for cerebral palsy and also sleep  Zoloft - states was taking 100 mg and she was still having anxiety and mood symptoms  Effexor XR 75 mg by mouth once daily  Buspirone 10 mg by mouth twice daily  Lamictal      Last Menstrual Period:  Uterine ablation on 1/6/21.  Denies any chance of pregnancy.  The patient was educated that her prescribed medications can have potential risk to a developing fetus. The patient is advised to contact this APRN/this office if she becomes pregnant or plans to become pregnant.  Pt verbalizes understanding and acknowledged agreement with this plan in her own words.          The following portions of the patient's history were reviewed and updated as appropriate: allergies, current medications, past family history, past medical history, past social history, past surgical history and problem list.          Past Medical History:  Past Medical History:   Diagnosis Date   •  Anxiety    • Cerebral palsy (HCC)    • Depression    • Lazy eye    • PTSD (post-traumatic stress disorder)    • Seasonal allergies    • Vision decreased        Social History:  Social History     Socioeconomic History   • Marital status:    Tobacco Use   • Smoking status: Current Every Day Smoker     Packs/day: 0.50   • Smokeless tobacco: Never Used   Substance and Sexual Activity   • Alcohol use: Not Currently     Comment: States an occasional social drink, but not often   • Drug use: Never   • Sexual activity: Not Currently     Partners: Male       Family History:  Family History   Problem Relation Age of Onset   • Heart attack Mother    • Stroke Father    • Alcohol abuse Father    • Anxiety disorder Sister    • Depression Sister    • Alcohol abuse Sister    • Breast cancer Other        Past Surgical History:  Past Surgical History:   Procedure Laterality Date   • CATARACT EXTRACTION     • ENDOMETRIAL ABLATION     • LEG SURGERY      bilateral lower extremity surgeries due to cerebral palsy complications       Problem List:  There is no problem list on file for this patient.      Allergy:   No Known Allergies     Current Medications:   Current Outpatient Medications   Medication Sig Dispense Refill   • busPIRone (BUSPAR) 30 MG tablet Take 1 tablet by mouth 2 (Two) Times a Day. 60 tablet 0   • lamoTRIgine (LaMICtal) 150 MG tablet Take 1 tablet by mouth Daily. 30 tablet 0   • venlafaxine XR (EFFEXOR-XR) 150 MG 24 hr capsule Take 1 capsule by mouth Daily. 30 capsule 0   • Loratadine 10 MG capsule Take 10 mg by mouth Daily.     • Melatonin 10 MG tablet dispersible Place 10 mg on the tongue every night at bedtime.     • methocarbamol (ROBAXIN) 500 MG tablet Take 500 mg by mouth 3 (Three) Times a Day As Needed for Muscle Spasms.     • temazepam (RESTORIL) 30 MG capsule Take 30 mg by mouth At Night As Needed for Sleep.       No current facility-administered medications for this visit.       Review of Symptoms:     Review of Systems   Constitutional: Positive for fatigue.   Psychiatric/Behavioral: Positive for decreased concentration, sleep disturbance, depressed mood and stress. Negative for agitation, behavioral problems, dysphoric mood, hallucinations, self-injury, suicidal ideas and negative for hyperactivity. The patient is nervous/anxious.          Physical Exam:   There were no vitals taken for this visit. There is no height or weight on file to calculate BMI.   Due to the remote nature of this encounter (virtual encounter), vitals were unable to be obtained.  Height stated at 65 inches.  Weight stated at around 150 pounds.        Physical Exam  Constitutional:       Appearance: She is well-developed.   Neurological:      Mental Status: She is alert and oriented to person, place, and time.   Psychiatric:         Attention and Perception: Attention normal. She is attentive.         Mood and Affect: Mood is anxious and depressed. Affect is tearful.         Speech: Speech normal.         Behavior: Behavior normal. Behavior is cooperative.         Thought Content: Thought content normal. Thought content is not paranoid or delusional. Thought content does not include homicidal or suicidal ideation. Thought content does not include homicidal or suicidal plan.         Cognition and Memory: Cognition and memory normal.         Judgment: Judgment normal. Judgment is not impulsive or inappropriate.         Mental Status Exam:   Hygiene:   good  Cooperation:  Cooperative  Eye Contact:  Good  Psychomotor Behavior:  Appropriate  Affect:  Tearful at times  Mood: depressed and anxious  Hopelessness: Denies  Speech:  Normal  Thought Process:  Linear  Thought Content:  Mood congruent  Suicidal:  None  Homicidal:  None  Hallucinations:  None  Delusion:  None  Memory:  Intact  Orientation:  Person, Place, Time and Situation  Reliability:  good  Insight:  Good  Judgement:  Good  Impulse Control:  Good  Physical/Medical Issues:  No             Lab Results:   No visits with results within 1 Month(s) from this visit.   Latest known visit with results is:   No results found for any previous visit.         Assessment/Plan   Problems Addressed this Visit    None     Visit Diagnoses     Major depressive disorder, recurrent episode, moderate (HCC)  (Chronic)   -  Primary    Relevant Medications    busPIRone (BUSPAR) 30 MG tablet    lamoTRIgine (LaMICtal) 150 MG tablet    venlafaxine XR (EFFEXOR-XR) 150 MG 24 hr capsule    Anxiety disorder, unspecified type  (Chronic)       Relevant Medications    busPIRone (BUSPAR) 30 MG tablet    venlafaxine XR (EFFEXOR-XR) 150 MG 24 hr capsule    History of posttraumatic stress disorder (PTSD)        Relevant Medications    venlafaxine XR (EFFEXOR-XR) 150 MG 24 hr capsule    Sleeping difficulties          Diagnoses       Codes Comments    Major depressive disorder, recurrent episode, moderate (HCC)    -  Primary ICD-10-CM: F33.1  ICD-9-CM: 296.32     Anxiety disorder, unspecified type     ICD-10-CM: F41.9  ICD-9-CM: 300.00     History of posttraumatic stress disorder (PTSD)     ICD-10-CM: Z86.59  ICD-9-CM: V11.8     Sleeping difficulties     ICD-10-CM: G47.9  ICD-9-CM: 780.50           Visit Diagnoses:    ICD-10-CM ICD-9-CM   1. Major depressive disorder, recurrent episode, moderate (HCC)  F33.1 296.32   2. Anxiety disorder, unspecified type  F41.9 300.00   3. History of posttraumatic stress disorder (PTSD)  Z86.59 V11.8   4. Sleeping difficulties  G47.9 780.50          GOALS:  Short Term Goals: Patient will be compliant with medication, and patient will have no significant medication related side effects.  Patient will be engaged in psychotherapy as indicated.  Patient will report subjective improvement of symptoms.  Long term goals: To stabilize mood and treat/improve subjective symptoms, the patient will stay out of the hospital, the patient will be at an optimal level of functioning, and the patient will take all  medications as prescribed.  The patient verbalized understanding and agreement with goals that were mutually set.      TREATMENT PLAN: Continue supportive psychotherapy efforts and medications as indicated.  Medication and treatment options, both pharmacological and non-pharmacological treatment options, discussed during today's visit, including any off label use of medication. Patient acknowledged and verbally consented with current treatment plan and was educated on the importance of compliance with treatment and follow-up appointments.      - Continue Effexor  mg by mouth once daily in the mornings for mood.  - Continue buspirone 30 mg by mouth two times daily for mood.   - Continue Lamictal 150 mg by mouth once daily as an adjunct for mood.      MEDICATION ISSUES:  Discussed medication options and treatment plan of prescribed medication, any off label use of medication, as well as the risks, benefits, any black box warnings including increased suicidality, and side effects including but not limited to potential falls, dizziness, possible impaired driving, GI side effects (change in appetite, abdominal discomfort, nausea, vomiting, diarrhea, and/or constipation), dry mouth, somnolence, sedation, insomnia, activation, agitation, irritation, tremors, abnormal muscle movements or disorders, headache, sweating, possible bruising or rare bleeding, electrolyte and/or fluid abnormalities, change in blood pressure/heart rate/and or heart rhythm, sexual dysfunction, and metabolic adversities among others. Patient and/or guardian agreeable to call the office with any worsening of symptoms or onset of side effects, or if any concerns or questions arise.  The contact information for the office is made available to the patient and/or guardian.  Patient and/or guardian agreeable to call 911 or go to the nearest ER should they begin having any SI/HI, or if any urgent concerns arise. No medication side effects or related  complaints today.    This APRN has discussed the benefits and risks of taking/continuing Lamictal (Lamotrigine).  The side effects of Lamictal can include a benign rash, blurred or double vision, dizziness, ataxia, sedation, headache, tremor, insomnia, poor coordination, fatigue,  nausea, vomiting, dyspepsia, rhinitis, infection, pharyngitis, asthenia, a rare but serious rash, rare multi-organ failure associated with Pires-Esteban Syndrome, toxic epidermal necrolysis, drug hypersensitivity syndrome, rare blood dyscrasias, rare aseptic meningitis, rare sudden unexplained deaths in people with epilepsy, withdrawal seizures upon abrupt withdrawal, and rare activation of suicidal ideation and behavior (suicidality).  This APRN has discussed that a very slow dose titration when starting, or changing doses, of Lamictal may reduce the incidence of skin rash and other side effects.  The dosage should not be titrated upwards or increased faster than recommended due to the possibility of the discussed side effects and risk of development of a skin rash (which can become life threatening).    This APRN has also discussed that if the patient stops taking the Lamictal for 3-5 days or longer, it will be necessary to restart the drug with an initial dose titration, as rashes have been reported on reexposure.  If the patient and Provider decide to stop the Lamictal, the patient will follow the directions of this APRN/this office as a guided taper over about two weeks is appropriate due to the risk of relapse in bipolar disorder with those with a mood or bipolar disorder, the risk of seizures in those with epilepsy, and discontinuation symptoms upon rapid discontinuation of Lamictal.    The patient verbalizes understanding of benefits and risks as discussed, the patient/guardian feels the benefits outweigh the risks and is agreeable to continue/take Lamictal as discussed.  The patient is advised should any side effects or rash  develops they are to stop the Lamictal immediately and contact this APRN/this office or go to the emergency department immediately.  The patient verbalizes understanding and agreement with treatment plan in their own words.      SUICIDE RISK ASSESSMENT AND SAFETY PLAN: Unalterable demographics and a history of mental health intervention indicate this patient is in a high risk category compared to the general population. At present, the patient denies active SI/HI, intentions, or plans at this time and agrees to seek immediate care should such thoughts develop. The patient verbalizes understanding of how to access emergency care if needed and agrees to do so. Consideration of suicide risk and protective factors such as history, current presentation, individual strengths and weaknesses, psychosocial and environmental stressors and variables, psychiatric illness and symptoms, medical conditions and pain, took place in this interview. Based on those considerations, the patient is determined: within individual baseline and presenting no imminent risk for suicide or homicide. Other recommendations: The patient does not meet the criteria for inpatient admission and is not a safety risk to self or others at today's visit. Inpatient treatment offers no significant advantages over outpatient treatment for this patient at today's visit.  The patient was given ample time for questions and fully participated in treatment planning.  The patient was encouraged to call the clinic with any questions or concerns.  The patient was informed of access to emergency care. If patient were to develop any significant symptomatology, suicidal ideation, homicidal ideation, any concerns, or feel unsafe at any time they are to call the clinic and if unable to get immediate assistance should immediately call 911 or go to the nearest emergency room.  Patient contracted verbally for the following: If you are experiencing an emotional crisis or have  thoughts of harming yourself or others, please go to your nearest local emergency room or call 911. Will continue to re-assess medication response and side effects frequently to establish efficacy and ensure safety. Risks, any black box warnings, side effects, off label usage, and benefits of medication and treatment discussed with patient, along with potential adverse side effects of current and/or newly prescribed medication, alternative treatment options, and OTC medications.  Patient verbalized understanding of potential risks, any off label use of medication, any black box warnings, and any side effects in their own words. The patient verbalized understanding and agreed to comply with the safety plan discussed in their own words.  Patient given the number to the office. Number also discussed of the 24- hour suicide hotline.           MEDS ORDERED DURING VISIT:  New Medications Ordered This Visit   Medications   • busPIRone (BUSPAR) 30 MG tablet     Sig: Take 1 tablet by mouth 2 (Two) Times a Day.     Dispense:  60 tablet     Refill:  0   • lamoTRIgine (LaMICtal) 150 MG tablet     Sig: Take 1 tablet by mouth Daily.     Dispense:  30 tablet     Refill:  0   • venlafaxine XR (EFFEXOR-XR) 150 MG 24 hr capsule     Sig: Take 1 capsule by mouth Daily.     Dispense:  30 capsule     Refill:  0     Return in about 4 weeks (around 5/26/2022), or if symptoms worsen or fail to improve, for Next scheduled follow up and Recheck.         Progress toward goal: Not at goal    Functional Status: Moderate impairment     Prognosis: Fair with Ongoing Treatment      Treatment plan completed: 2/25/21.            This document has been electronically signed by GISELA Vaughn  April 28, 2022 11:36 EDT    Some of the data in this electronic note has been brought forward from a previous encounter, any necessary changes have been made, it has been reviewed by this APRN, and it is accurate.    Please note that portions of this  note were completed with a voice recognition program.

## 2022-05-19 DIAGNOSIS — F33.1 MAJOR DEPRESSIVE DISORDER, RECURRENT EPISODE, MODERATE: Chronic | ICD-10-CM

## 2022-05-19 RX ORDER — LAMOTRIGINE 150 MG/1
150 TABLET ORAL DAILY
Qty: 30 TABLET | Refills: 0 | Status: SHIPPED | OUTPATIENT
Start: 2022-05-19 | End: 2022-05-26 | Stop reason: SDUPTHER

## 2022-05-26 ENCOUNTER — TELEMEDICINE (OUTPATIENT)
Dept: PSYCHIATRY | Facility: CLINIC | Age: 57
End: 2022-05-26

## 2022-05-26 DIAGNOSIS — F41.9 ANXIETY DISORDER, UNSPECIFIED TYPE: Chronic | ICD-10-CM

## 2022-05-26 DIAGNOSIS — Z86.59 HISTORY OF POSTTRAUMATIC STRESS DISORDER (PTSD): ICD-10-CM

## 2022-05-26 DIAGNOSIS — Z72.0 TOBACCO USE: ICD-10-CM

## 2022-05-26 DIAGNOSIS — F33.1 MAJOR DEPRESSIVE DISORDER, RECURRENT EPISODE, MODERATE: Primary | Chronic | ICD-10-CM

## 2022-05-26 PROCEDURE — 99214 OFFICE O/P EST MOD 30 MIN: CPT | Performed by: NURSE PRACTITIONER

## 2022-05-26 RX ORDER — LAMOTRIGINE 150 MG/1
150 TABLET ORAL DAILY
Qty: 30 TABLET | Refills: 1 | Status: SHIPPED | OUTPATIENT
Start: 2022-05-26 | End: 2022-07-12 | Stop reason: SDUPTHER

## 2022-05-26 RX ORDER — VENLAFAXINE HYDROCHLORIDE 150 MG/1
150 CAPSULE, EXTENDED RELEASE ORAL DAILY
Qty: 30 CAPSULE | Refills: 1 | Status: SHIPPED | OUTPATIENT
Start: 2022-05-26 | End: 2022-07-12 | Stop reason: SDUPTHER

## 2022-05-26 RX ORDER — BUSPIRONE HYDROCHLORIDE 30 MG/1
30 TABLET ORAL 2 TIMES DAILY
Qty: 60 TABLET | Refills: 1 | Status: SHIPPED | OUTPATIENT
Start: 2022-05-26 | End: 2022-07-12 | Stop reason: SDUPTHER

## 2022-05-26 NOTE — PROGRESS NOTES
This provider is located at the [facility] (through [facility]), [address], [facility], [address] using a secure quickhuddlehart Video Visit through [employer]. Patient is being seen remotely via telehealth at their home address in [place], and stated they are in a secure environment for this session. The patient's condition being diagnosed/treated is appropriate for telemedicine. The provider identified herself as well as her credentials.   The patient, and/or patients guardian, consent to be seen remotely, and when consent is given they understand that the consent allows for patient identifiable information to be sent to a third party as needed.   They may refuse to be seen remotely at any time. The electronic data is encrypted and password protected, and the patient and/or guardian has been advised of the potential risks to privacy not withstanding such measures.    You have chosen to receive care through a telehealth visit.  Do you consent to use a video/audio connection for your medical care today? Yes        Subjective   Lovely Mixon is a 57 y.o. female who presents today for follow up    Chief Complaint:  Depression, anxiety, history of sleeping difficulties, and history of PTSD    Accompanied by: The patient is interviewed alone at today's encounter    History of Present Illness:   The patient describes her mood as both depressed and anxious over the last few weeks.  The patient states even though she is both depressed and anxious she does feel that her current medications are working.  The patient reports she is still in therapy, and therapy has been helping a lot with her reported psychiatric symptoms.  The patient reports her anxiety is higher than her depression, but both are controlled at this time on her current medications.  The patient reports her appetite as fair.  The patient reports her sleep as fair.  The patient denies any new medical problems or changes in  medications since last appointment with this facility.  The patient reports compliance with her current medication regimen.  The patient denies any side effects or concerns from her current medication regimen.   The patient would like to not adjust or change her medications at this visit.  The patient denies any suicidal or homicidal ideations, plans, or intent at today's encounter and is convincing.  The patient denies any auditory hallucinations or visual hallucinations.  The patient does not endorse any significant symptoms consistent with lea or psychosis at today's encounter.      Primary Care Provider:  Mine Abarca    Prior Psychiatric Medications:  Temazepam 30 mg by mouth once daily at bedtime - states she has taken this since around 2014 for cerebral palsy and also sleep  Zoloft - states was taking 100 mg and she was still having anxiety and mood symptoms  Effexor XR 75 mg by mouth once daily  Buspirone 10 mg by mouth twice daily  Lamictal      Last Menstrual Period:  Uterine ablation on 1/6/21.  Denies any chance of pregnancy.  The patient was educated that her prescribed medications can have potential risk to a developing fetus. The patient is advised to contact this APRN/this office if she becomes pregnant or plans to become pregnant.  Pt verbalizes understanding and acknowledged agreement with this plan in her own words.          The following portions of the patient's history were reviewed and updated as appropriate: allergies, current medications, past family history, past medical history, past social history, past surgical history and problem list.          Past Medical History:  Past Medical History:   Diagnosis Date   • Anxiety    • Cerebral palsy (HCC)    • Depression    • Lazy eye    • PTSD (post-traumatic stress disorder)    • Seasonal allergies    • Vision decreased        Social History:  Social History     Socioeconomic History   • Marital status:    Tobacco Use   •  Smoking status: Current Every Day Smoker     Packs/day: 0.50   • Smokeless tobacco: Never Used   Substance and Sexual Activity   • Alcohol use: Not Currently     Comment: States an occasional social drink, but not often   • Drug use: Never   • Sexual activity: Not Currently     Partners: Male       Family History:  Family History   Problem Relation Age of Onset   • Heart attack Mother    • Stroke Father    • Alcohol abuse Father    • Anxiety disorder Sister    • Depression Sister    • Alcohol abuse Sister    • Breast cancer Other        Past Surgical History:  Past Surgical History:   Procedure Laterality Date   • CATARACT EXTRACTION     • ENDOMETRIAL ABLATION     • LEG SURGERY      bilateral lower extremity surgeries due to cerebral palsy complications       Problem List:  There is no problem list on file for this patient.      Allergy:   No Known Allergies     Current Medications:   Current Outpatient Medications   Medication Sig Dispense Refill   • busPIRone (BUSPAR) 30 MG tablet Take 1 tablet by mouth 2 (Two) Times a Day. 60 tablet 1   • lamoTRIgine (LaMICtal) 150 MG tablet Take 1 tablet by mouth Daily. 30 tablet 1   • venlafaxine XR (EFFEXOR-XR) 150 MG 24 hr capsule Take 1 capsule by mouth Daily. 30 capsule 1   • Loratadine 10 MG capsule Take 10 mg by mouth Daily.     • Melatonin 10 MG tablet dispersible Place 10 mg on the tongue every night at bedtime.     • methocarbamol (ROBAXIN) 500 MG tablet Take 500 mg by mouth 3 (Three) Times a Day As Needed for Muscle Spasms.     • temazepam (RESTORIL) 30 MG capsule Take 30 mg by mouth At Night As Needed for Sleep.       No current facility-administered medications for this visit.       Review of Symptoms:    Review of Systems   Constitutional: Positive for fatigue.   Psychiatric/Behavioral: Positive for agitation, decreased concentration, sleep disturbance, depressed mood and stress. Negative for behavioral problems, dysphoric mood, hallucinations, self-injury,  suicidal ideas and negative for hyperactivity. The patient is nervous/anxious.          Physical Exam:   There were no vitals taken for this visit. There is no height or weight on file to calculate BMI.   Due to the remote nature of this encounter (virtual encounter), vitals were unable to be obtained.  Height stated at 65 inches.  Weight stated at around 160 pounds.        Physical Exam  Constitutional:       Appearance: She is well-developed.   Neurological:      Mental Status: She is alert and oriented to person, place, and time.   Psychiatric:         Attention and Perception: Attention normal. She is attentive.         Mood and Affect: Mood is anxious and depressed.         Speech: Speech normal.         Behavior: Behavior normal. Behavior is cooperative.         Thought Content: Thought content normal. Thought content is not paranoid or delusional. Thought content does not include homicidal or suicidal ideation. Thought content does not include homicidal or suicidal plan.         Cognition and Memory: Cognition and memory normal.         Judgment: Judgment normal. Judgment is not impulsive or inappropriate.         Mental Status Exam:   Hygiene:   good  Cooperation:  Cooperative  Eye Contact:  Good  Psychomotor Behavior:  Appropriate  Affect:  Appropriate  Mood: depressed and anxious  Hopelessness: Denies  Speech:  Normal  Thought Process:  Linear  Thought Content:  Mood congruent  Suicidal:  None  Homicidal:  None  Hallucinations:  None  Delusion:  None  Memory:  Intact  Orientation:  Person, Place, Time and Situation  Reliability:  good  Insight:  Good  Judgement:  Good  Impulse Control:  Good  Physical/Medical Issues:  No            Lab Results:   No visits with results within 1 Month(s) from this visit.   Latest known visit with results is:   No results found for any previous visit.         Assessment & Plan   Problems Addressed this Visit    None     Visit Diagnoses     Major depressive disorder,  recurrent episode, moderate (HCC)  (Chronic)   -  Primary    Relevant Medications    busPIRone (BUSPAR) 30 MG tablet    lamoTRIgine (LaMICtal) 150 MG tablet    venlafaxine XR (EFFEXOR-XR) 150 MG 24 hr capsule    Anxiety disorder, unspecified type  (Chronic)       Relevant Medications    busPIRone (BUSPAR) 30 MG tablet    venlafaxine XR (EFFEXOR-XR) 150 MG 24 hr capsule    History of posttraumatic stress disorder (PTSD)        Relevant Medications    venlafaxine XR (EFFEXOR-XR) 150 MG 24 hr capsule    Tobacco use          Diagnoses       Codes Comments    Major depressive disorder, recurrent episode, moderate (HCC)    -  Primary ICD-10-CM: F33.1  ICD-9-CM: 296.32     Anxiety disorder, unspecified type     ICD-10-CM: F41.9  ICD-9-CM: 300.00     History of posttraumatic stress disorder (PTSD)     ICD-10-CM: Z86.59  ICD-9-CM: V11.8     Tobacco use     ICD-10-CM: Z72.0  ICD-9-CM: 305.1           Visit Diagnoses:    ICD-10-CM ICD-9-CM   1. Major depressive disorder, recurrent episode, moderate (HCC)  F33.1 296.32   2. Anxiety disorder, unspecified type  F41.9 300.00   3. History of posttraumatic stress disorder (PTSD)  Z86.59 V11.8   4. Tobacco use  Z72.0 305.1          GOALS:  Short Term Goals: Patient will be compliant with medication, and patient will have no significant medication related side effects.  Patient will be engaged in psychotherapy as indicated.  Patient will report subjective improvement of symptoms.  Long term goals: To stabilize mood and treat/improve subjective symptoms, the patient will stay out of the hospital, the patient will be at an optimal level of functioning, and the patient will take all medications as prescribed.  The patient verbalized understanding and agreement with goals that were mutually set.      TREATMENT PLAN: Continue supportive psychotherapy efforts and medications as indicated.  Medication and treatment options, both pharmacological and non-pharmacological treatment options,  discussed during today's visit, including any off label use of medication. Patient acknowledged and verbally consented with current treatment plan and was educated on the importance of compliance with treatment and follow-up appointments.      - Continue Effexor  mg by mouth once daily in the mornings for mood.  - Continue buspirone 30 mg by mouth two times daily for mood.   - Continue Lamictal 150 mg by mouth once daily as an adjunct for mood.      MEDICATION ISSUES:  Discussed medication options and treatment plan of prescribed medication, any off label use of medication, as well as the risks, benefits, any black box warnings including increased suicidality, and side effects including but not limited to potential falls, dizziness, possible impaired driving, GI side effects (change in appetite, abdominal discomfort, nausea, vomiting, diarrhea, and/or constipation), dry mouth, somnolence, sedation, insomnia, activation, agitation, irritation, tremors, abnormal muscle movements or disorders, headache, sweating, possible bruising or rare bleeding, electrolyte and/or fluid abnormalities, change in blood pressure/heart rate/and or heart rhythm, sexual dysfunction, and metabolic adversities among others. Patient and/or guardian agreeable to call the office with any worsening of symptoms or onset of side effects, or if any concerns or questions arise.  The contact information for the office is made available to the patient and/or guardian.  Patient and/or guardian agreeable to call 911 or go to the nearest ER should they begin having any SI/HI, or if any urgent concerns arise. No medication side effects or related complaints today.    This APRN has discussed the benefits and risks of taking/continuing Lamictal (Lamotrigine).  The side effects of Lamictal can include a benign rash, blurred or double vision, dizziness, ataxia, sedation, headache, tremor, insomnia, poor coordination, fatigue,  nausea, vomiting,  dyspepsia, rhinitis, infection, pharyngitis, asthenia, a rare but serious rash, rare multi-organ failure associated with Pires-Esteban Syndrome, toxic epidermal necrolysis, drug hypersensitivity syndrome, rare blood dyscrasias, rare aseptic meningitis, rare sudden unexplained deaths in people with epilepsy, withdrawal seizures upon abrupt withdrawal, and rare activation of suicidal ideation and behavior (suicidality).  This APRN has discussed that a very slow dose titration when starting, or changing doses, of Lamictal may reduce the incidence of skin rash and other side effects.  The dosage should not be titrated upwards or increased faster than recommended due to the possibility of the discussed side effects and risk of development of a skin rash (which can become life threatening).    This APRN has also discussed that if the patient stops taking the Lamictal for 3-5 days or longer, it will be necessary to restart the drug with an initial dose titration, as rashes have been reported on reexposure.  If the patient and Provider decide to stop the Lamictal, the patient will follow the directions of this APRN/this office as a guided taper over about two weeks is appropriate due to the risk of relapse in bipolar disorder with those with a mood or bipolar disorder, the risk of seizures in those with epilepsy, and discontinuation symptoms upon rapid discontinuation of Lamictal.    The patient verbalizes understanding of benefits and risks as discussed, the patient/guardian feels the benefits outweigh the risks and is agreeable to continue/take Lamictal as discussed.  The patient is advised should any side effects or rash develops they are to stop the Lamictal immediately and contact this APRN/this office or go to the emergency department immediately.  The patient verbalizes understanding and agreement with treatment plan in their own words.      SUICIDE RISK ASSESSMENT AND SAFETY PLAN: Unalterable demographics and a  history of mental health intervention indicate this patient is in a high risk category compared to the general population. At present, the patient denies active SI/HI, intentions, or plans at this time and agrees to seek immediate care should such thoughts develop. The patient verbalizes understanding of how to access emergency care if needed and agrees to do so. Consideration of suicide risk and protective factors such as history, current presentation, individual strengths and weaknesses, psychosocial and environmental stressors and variables, psychiatric illness and symptoms, medical conditions and pain, took place in this interview. Based on those considerations, the patient is determined: within individual baseline and presenting no imminent risk for suicide or homicide. Other recommendations: The patient does not meet the criteria for inpatient admission and is not a safety risk to self or others at today's visit. Inpatient treatment offers no significant advantages over outpatient treatment for this patient at today's visit.  The patient was given ample time for questions and fully participated in treatment planning.  The patient was encouraged to call the clinic with any questions or concerns.  The patient was informed of access to emergency care. If patient were to develop any significant symptomatology, suicidal ideation, homicidal ideation, any concerns, or feel unsafe at any time they are to call the clinic and if unable to get immediate assistance should immediately call 911 or go to the nearest emergency room.  Patient contracted verbally for the following: If you are experiencing an emotional crisis or have thoughts of harming yourself or others, please go to your nearest local emergency room or call 911. Will continue to re-assess medication response and side effects frequently to establish efficacy and ensure safety. Risks, any black box warnings, side effects, off label usage, and benefits of  medication and treatment discussed with patient, along with potential adverse side effects of current and/or newly prescribed medication, alternative treatment options, and OTC medications.  Patient verbalized understanding of potential risks, any off label use of medication, any black box warnings, and any side effects in their own words. The patient verbalized understanding and agreed to comply with the safety plan discussed in their own words.  Patient given the number to the office. Number also discussed of the 24- hour suicide hotline.           MEDS ORDERED DURING VISIT:  New Medications Ordered This Visit   Medications   • busPIRone (BUSPAR) 30 MG tablet     Sig: Take 1 tablet by mouth 2 (Two) Times a Day.     Dispense:  60 tablet     Refill:  1   • lamoTRIgine (LaMICtal) 150 MG tablet     Sig: Take 1 tablet by mouth Daily.     Dispense:  30 tablet     Refill:  1   • venlafaxine XR (EFFEXOR-XR) 150 MG 24 hr capsule     Sig: Take 1 capsule by mouth Daily.     Dispense:  30 capsule     Refill:  1     Return in about 6 weeks (around 7/7/2022), or if symptoms worsen or fail to improve, for Next scheduled follow up and Recheck.         Progress toward goal: Not at goal    Functional Status: Moderate impairment     Prognosis: Fair with Ongoing Treatment      Treatment plan completed: 2/25/21.            This document has been electronically signed by GISELA Vaughn  May 26, 2022 16:01 EDT    Some of the data in this electronic note has been brought forward from a previous encounter, any necessary changes have been made, it has been reviewed by this APRN, and it is accurate.    Please note that portions of this note were completed with a voice recognition program.

## 2022-05-26 NOTE — PATIENT INSTRUCTIONS
IF YOU SMOKE OR USE TOBACCO PLEASE READ THE FOLLOWING:  Why is smoking bad for me?  Smoking increases the risk of heart disease, lung disease, vascular disease, stroke, and cancer. If you smoke, STOP!    For more information:  Quit Now [place]  1-800-QUIT-NOW  https://stevey.quitlogix.org/en-[place]/    Steps to Quit Smoking  Smoking tobacco is the leading cause of preventable death. It can affect almost every organ in the body. Smoking puts you and those around you at risk for developing many serious chronic diseases. Quitting smoking can be difficult, but it is one of the best things that you can do for your health. It is never too late to quit.  How do I get ready to quit?  When you decide to quit smoking, create a plan to help you succeed. Before you quit:  · Pick a date to quit. Set a date within the next 2 weeks to give you time to prepare.  · Write down the reasons why you are quitting. Keep this list in places where you will see it often.  · Tell your family, friends, and co-workers that you are quitting. Support from your loved ones can make quitting easier.  · Talk with your health care provider about your options for quitting smoking.  · Find out what treatment options are covered by your health insurance.  · Identify people, places, things, and activities that make you want to smoke (triggers). Avoid them.  What first steps can I take to quit smoking?  · Throw away all cigarettes at home, at work, and in your car.  · Throw away smoking accessories, such as ashtrays and lighters.  · Clean your car. Make sure to empty the ashtray.  · Clean your home, including curtains and carpets.  What strategies can I use to quit smoking?  Talk with your health care provider about combining strategies, such as taking medicines while you are also receiving in-person counseling. Using these two strategies together makes you more likely to succeed in quitting than if you used either strategy on its own.  · If you are  pregnant or breastfeeding, talk with your health care provider about finding counseling or other support strategies to quit smoking. Do not take medicine to help you quit smoking unless your health care provider tells you to do so.  To quit smoking:  Quit right away  · Quit smoking completely, instead of gradually reducing how much you smoke over a period of time. Research shows that stopping smoking right away is more successful than gradually quitting.  · Attend in-person counseling to help you build problem-solving skills. You are more likely to succeed in quitting if you attend counseling sessions regularly. Even short sessions of 10 minutes can be effective.  Take medicine  You may take medicines to help you quit smoking. Some medicines require a prescription and some you can purchase over-the-counter. Medicines may have nicotine in them to replace the nicotine in cigarettes. Medicines may:  · Help to stop cravings.  · Help to relieve withdrawal symptoms.  Your health care provider may recommend:  · Nicotine patches, gum, or lozenges.  · Nicotine inhalers or sprays.  · Non-nicotine medicine that is taken by mouth.  Find resources  Find resources and support systems that can help you to quit smoking and remain smoke-free after you quit. These resources are most helpful when you use them often. They include:  · Online chats with a counselor.  · Telephone quitlines.  · Printed self-help materials.  · Support groups or group counseling.  · Text messaging programs.  · Mobile phone apps or applications. Use apps that can help you stick to your quit plan by providing reminders, tips, and encouragement. There are many free apps for mobile devices as well as websites. Examples include Quit Guide from the CDC and smokefree.gov  What things can I do to make it easier to quit?    · Reach out to your family and friends for support and encouragement. Call telephone quitlines (2-800-QUIT-NOW), reach out to support groups, or  work with a counselor for support.  · Ask people who smoke to avoid smoking around you.  · Avoid places that trigger you to smoke, such as bars, parties, or smoke-break areas at work.  · Spend time with people who do not smoke.  · Lessen the stress in your life. Stress can be a smoking trigger for some people. To lessen stress, try:  ? Exercising regularly.  ? Doing deep-breathing exercises.  ? Doing yoga.  ? Meditating.  ? Performing a body scan. This involves closing your eyes, scanning your body from head to toe, and noticing which parts of your body are particularly tense. Try to relax the muscles in those areas.  How will I feel when I quit smoking?  Day 1 to 3 weeks  Within the first 24 hours of quitting smoking, you may start to feel withdrawal symptoms. These symptoms are usually most noticeable 2-3 days after quitting, but they usually do not last for more than 2-3 weeks. You may experience these symptoms:  · Mood swings.  · Restlessness, anxiety, or irritability.  · Trouble concentrating.  · Dizziness.  · Strong cravings for sugary foods and nicotine.  · Mild weight gain.  · Constipation.  · Nausea.  · Coughing or a sore throat.  · Changes in how the medicines that you take for unrelated issues work in your body.  · Depression.  · Trouble sleeping (insomnia).  Week 3 and afterward  After the first 2-3 weeks of quitting, you may start to notice more positive results, such as:  · Improved sense of smell and taste.  · Decreased coughing and sore throat.  · Slower heart rate.  · Lower blood pressure.  · Clearer skin.  · The ability to breathe more easily.  · Fewer sick days.  Quitting smoking can be very challenging. Do not get discouraged if you are not successful the first time. Some people need to make many attempts to quit before they achieve long-term success. Do your best to stick to your quit plan, and talk with your health care provider if you have any questions or concerns.  Summary  · Smoking tobacco  is the leading cause of preventable death. Quitting smoking is one of the best things that you can do for your health.  · When you decide to quit smoking, create a plan to help you succeed.  · Quit smoking right away, not slowly over a period of time.  · When you start quitting, seek help from your health care provider, family, or friends.  This information is not intended to replace advice given to you by your health care provider. Make sure you discuss any questions you have with your health care provider.  Document Revised: 09/11/2020 Document Reviewed: 03/07/2020  Elsevier Patient Education © 2021 [employer]

## 2022-06-01 ENCOUNTER — PRIOR AUTHORIZATION (OUTPATIENT)
Dept: PSYCHIATRY | Facility: CLINIC | Age: 57
End: 2022-06-01

## 2022-07-12 ENCOUNTER — TELEMEDICINE (OUTPATIENT)
Dept: PSYCHIATRY | Facility: CLINIC | Age: 57
End: 2022-07-12

## 2022-07-12 DIAGNOSIS — F33.1 MAJOR DEPRESSIVE DISORDER, RECURRENT EPISODE, MODERATE: Primary | Chronic | ICD-10-CM

## 2022-07-12 DIAGNOSIS — Z86.59 HISTORY OF POSTTRAUMATIC STRESS DISORDER (PTSD): Chronic | ICD-10-CM

## 2022-07-12 DIAGNOSIS — G47.9 SLEEPING DIFFICULTIES: ICD-10-CM

## 2022-07-12 DIAGNOSIS — F51.5 NIGHTMARES: ICD-10-CM

## 2022-07-12 DIAGNOSIS — F41.9 ANXIETY DISORDER, UNSPECIFIED TYPE: Chronic | ICD-10-CM

## 2022-07-12 PROCEDURE — 99214 OFFICE O/P EST MOD 30 MIN: CPT | Performed by: NURSE PRACTITIONER

## 2022-07-12 RX ORDER — BUSPIRONE HYDROCHLORIDE 30 MG/1
30 TABLET ORAL 2 TIMES DAILY
Qty: 60 TABLET | Refills: 0 | Status: SHIPPED | OUTPATIENT
Start: 2022-07-12 | End: 2022-08-09 | Stop reason: SDUPTHER

## 2022-07-12 RX ORDER — VENLAFAXINE HYDROCHLORIDE 150 MG/1
150 CAPSULE, EXTENDED RELEASE ORAL DAILY
Qty: 30 CAPSULE | Refills: 0 | Status: SHIPPED | OUTPATIENT
Start: 2022-07-12 | End: 2022-08-09 | Stop reason: SDUPTHER

## 2022-07-12 RX ORDER — LAMOTRIGINE 150 MG/1
150 TABLET ORAL DAILY
Qty: 30 TABLET | Refills: 0 | Status: SHIPPED | OUTPATIENT
Start: 2022-07-12 | End: 2022-08-09 | Stop reason: SDUPTHER

## 2022-07-12 RX ORDER — PRAZOSIN HYDROCHLORIDE 1 MG/1
1 CAPSULE ORAL NIGHTLY
Qty: 30 CAPSULE | Refills: 0 | Status: SHIPPED | OUTPATIENT
Start: 2022-07-12 | End: 2022-08-09

## 2022-07-12 NOTE — PROGRESS NOTES
"This provider is located at the [facility] (through [facility]), [address], [place], [address] using a secure [employer] Video Visit through [employer]. Patient is being seen remotely via telehealth at their home address in [place], and stated they are in a secure environment for this session. The patient's condition being diagnosed/treated is appropriate for telemedicine. The provider identified herself as well as her credentials.   The patient, and/or patients guardian, consent to be seen remotely, and when consent is given they understand that the consent allows for patient identifiable information to be sent to a third party as needed.   They may refuse to be seen remotely at any time. The electronic data is encrypted and password protected, and the patient and/or guardian has been advised of the potential risks to privacy not withstanding such measures.    You have chosen to receive care through a telehealth visit.  Do you consent to use a video/audio connection for your medical care today? Yes      Subjective   Lovely Mixon is a 57 y.o. female who presents today for follow up    Chief Complaint:  Depression, anxiety, history of sleeping difficulties, nightmares, and history of PTSD follow-up    Accompanied by: The patient is interviewed alone at today's encounter    History of Present Illness:   The patient describes her mood as okay over the last few weeks.  The patient states she has had some good things happen recently, including a [employer] group that came and worked on her house for her which was very beneficial.  The patient rates her depression at a 8/10 on a 0-10 scale, with 10 being the worst.  The patient reports her symptoms of depression as not wanting to do things and feeling sad.  The patient reports even though she has had a lot of good things happen recently she still has \"more depression than I should\".  The patient rates her anxiety at a 5-6/10 " on a 0-10 scale, with 10 being the worst.  The patient reports her symptoms of anxiety as feeling restless, feeling shaky when he is anxious, and feeling on edge at times due to not being able to stop worrying.  The patient reports her appetite as good, if not too good.  The patient reports her sleep as fair.  The patient states she averages 6.5-7 hours of sleep each night.  The patient reports frequent nightmares that wake her up at night. The patient denies any new medical problems or changes in medications since last appointment with this facility.  The patient reports compliance with her current medication regimen.  The patient denies any side effects from her current medication regimen.  The patient denies any abnormal muscle movements or tics.   The patient would like to adjust her medication regimen at this visit to add something to help with nightmares.  The patient denies any suicidal or homicidal ideations, plans, or intent at today's encounter and is convincing.  The patient denies any auditory hallucinations or visual hallucinations.  The patient does not endorse any significant symptoms consistent with lea or psychosis at today's encounter.      Primary Care Provider:  Mine Abarca    Prior Psychiatric Medications:  -Temazepam 30 mg by mouth once daily at bedtime - states she has taken this since around 2014 for cerebral palsy and also sleep  -Zoloft - states was taking 100 mg and she was still having anxiety and mood symptoms  -Effexor XR 75 mg by mouth once daily  -Buspirone 10 mg by mouth twice daily  -Lamictal      Last Menstrual Period:  Uterine ablation on 1/6/21.  Denies any chance of pregnancy.  The patient was educated that her prescribed medications can have potential risk to a developing fetus. The patient is advised to contact this APRN/this office if she becomes pregnant or plans to become pregnant.  Pt verbalizes understanding and acknowledged agreement with this plan in her own  words.          The following portions of the patient's history were reviewed and updated as appropriate: allergies, current medications, past family history, past medical history, past social history, past surgical history and problem list.          Past Medical History:  Past Medical History:   Diagnosis Date   • Anxiety    • Cerebral palsy (HCC)    • Depression    • Lazy eye    • PTSD (post-traumatic stress disorder)    • Seasonal allergies    • Vision decreased        Social History:  Social History     Socioeconomic History   • Marital status:    Tobacco Use   • Smoking status: Current Every Day Smoker     Packs/day: 0.50   • Smokeless tobacco: Never Used   Substance and Sexual Activity   • Alcohol use: Not Currently     Comment: States an occasional social drink, but not often   • Drug use: Never   • Sexual activity: Not Currently     Partners: Male       Family History:  Family History   Problem Relation Age of Onset   • Heart attack Mother    • Stroke Father    • Alcohol abuse Father    • Anxiety disorder Sister    • Depression Sister    • Alcohol abuse Sister    • Breast cancer Other        Past Surgical History:  Past Surgical History:   Procedure Laterality Date   • CATARACT EXTRACTION     • ENDOMETRIAL ABLATION     • LEG SURGERY      bilateral lower extremity surgeries due to cerebral palsy complications       Problem List:  There is no problem list on file for this patient.      Allergy:   No Known Allergies     Current Medications:   Current Outpatient Medications   Medication Sig Dispense Refill   • busPIRone (BUSPAR) 30 MG tablet Take 1 tablet by mouth 2 (Two) Times a Day. 60 tablet 0   • lamoTRIgine (LaMICtal) 150 MG tablet Take 1 tablet by mouth Daily. 30 tablet 0   • venlafaxine XR (EFFEXOR-XR) 150 MG 24 hr capsule Take 1 capsule by mouth Daily. 30 capsule 0   • Loratadine 10 MG capsule Take 10 mg by mouth Daily.     • Melatonin 10 MG tablet dispersible Place 10 mg on the tongue every  night at bedtime.     • methocarbamol (ROBAXIN) 500 MG tablet Take 500 mg by mouth 3 (Three) Times a Day As Needed for Muscle Spasms.     • prazosin (MINIPRESS) 1 MG capsule Take 1 capsule by mouth Every Night. 30 capsule 0   • temazepam (RESTORIL) 30 MG capsule Take 30 mg by mouth At Night As Needed for Sleep.       No current facility-administered medications for this visit.       Review of Symptoms:    Review of Systems   Constitutional: Positive for fatigue.   Psychiatric/Behavioral: Positive for sleep disturbance, depressed mood and stress. Negative for agitation, behavioral problems, decreased concentration, dysphoric mood, hallucinations, self-injury, suicidal ideas and negative for hyperactivity. The patient is nervous/anxious.          Physical Exam:   There were no vitals taken for this visit. There is no height or weight on file to calculate BMI.   Due to the remote nature of this encounter (virtual encounter), vitals were unable to be obtained.  Height stated at 65 inches.  Weight stated at around 160 pounds or more.        Physical Exam  Constitutional:       Appearance: She is well-developed.   Neurological:      Mental Status: She is alert and oriented to person, place, and time.   Psychiatric:         Attention and Perception: Attention normal. She is attentive.         Mood and Affect: Mood is anxious and depressed.         Speech: Speech normal.         Behavior: Behavior normal. Behavior is cooperative.         Thought Content: Thought content normal. Thought content is not paranoid or delusional. Thought content does not include homicidal or suicidal ideation. Thought content does not include homicidal or suicidal plan.         Cognition and Memory: Cognition and memory normal.         Judgment: Judgment normal. Judgment is not impulsive or inappropriate.         Mental Status Exam:   Hygiene:   good  Cooperation:  Cooperative  Eye Contact:  Good  Psychomotor Behavior:  Appropriate  Affect:   Appropriate  Mood: depressed and anxious  Hopelessness: Denies  Speech:  Normal  Thought Process:  Linear  Thought Content:  Mood congruent  Suicidal:  None  Homicidal:  None  Hallucinations:  None  Delusion:  None  Memory:  Intact  Orientation:  Person, Place, Time and Situation  Reliability:  good  Insight:  Good  Judgement:  Good  Impulse Control:  Good  Physical/Medical Issues:  No            Lab Results:   No visits with results within 1 Month(s) from this visit.   Latest known visit with results is:   No results found for any previous visit.         Assessment & Plan   Problems Addressed this Visit    None     Visit Diagnoses     Major depressive disorder, recurrent episode, moderate (HCC)  (Chronic)   -  Primary    Relevant Medications    venlafaxine XR (EFFEXOR-XR) 150 MG 24 hr capsule    lamoTRIgine (LaMICtal) 150 MG tablet    busPIRone (BUSPAR) 30 MG tablet    Anxiety disorder, unspecified type  (Chronic)       Relevant Medications    venlafaxine XR (EFFEXOR-XR) 150 MG 24 hr capsule    busPIRone (BUSPAR) 30 MG tablet    History of posttraumatic stress disorder (PTSD)  (Chronic)       Relevant Medications    venlafaxine XR (EFFEXOR-XR) 150 MG 24 hr capsule    Sleeping difficulties        Nightmares        Relevant Medications    venlafaxine XR (EFFEXOR-XR) 150 MG 24 hr capsule    busPIRone (BUSPAR) 30 MG tablet    prazosin (MINIPRESS) 1 MG capsule      Diagnoses       Codes Comments    Major depressive disorder, recurrent episode, moderate (HCC)    -  Primary ICD-10-CM: F33.1  ICD-9-CM: 296.32     Anxiety disorder, unspecified type     ICD-10-CM: F41.9  ICD-9-CM: 300.00     History of posttraumatic stress disorder (PTSD)     ICD-10-CM: Z86.59  ICD-9-CM: V11.8     Sleeping difficulties     ICD-10-CM: G47.9  ICD-9-CM: 780.50     Nightmares     ICD-10-CM: F51.5  ICD-9-CM: 307.47           Visit Diagnoses:    ICD-10-CM ICD-9-CM   1. Major depressive disorder, recurrent episode, moderate (HCC)  F33.1 296.32   2.  Anxiety disorder, unspecified type  F41.9 300.00   3. History of posttraumatic stress disorder (PTSD)  Z86.59 V11.8   4. Sleeping difficulties  G47.9 780.50   5. Nightmares  F51.5 307.47          GOALS:  Short Term Goals: Patient will be compliant with medication, and patient will have no significant medication related side effects.  Patient will be engaged in psychotherapy as indicated.  Patient will report subjective improvement of symptoms.  Long term goals: To stabilize mood and treat/improve subjective symptoms, the patient will stay out of the hospital, the patient will be at an optimal level of functioning, and the patient will take all medications as prescribed.  The patient verbalized understanding and agreement with goals that were mutually set.      TREATMENT PLAN: Continue supportive psychotherapy efforts and medications as indicated.  Medication and treatment options, both pharmacological and non-pharmacological treatment options, discussed during today's visit, including any off label use of medication. Patient acknowledged and verbally consented with current treatment plan and was educated on the importance of compliance with treatment and follow-up appointments.      - Continue Effexor  mg by mouth once daily in the mornings for mood.  - Continue buspirone 30 mg by mouth two times daily for mood.   - Continue Lamictal 150 mg by mouth once daily as an adjunct for mood.  - Start prazosin 1 mg by mouth once nightly at bedtime for nightmares.      MEDICATION ISSUES:  Discussed medication options and treatment plan of prescribed medication, any off label use of medication, as well as the risks, benefits, any black box warnings including increased suicidality, and side effects including but not limited to potential falls, dizziness, possible impaired driving, GI side effects (change in appetite, abdominal discomfort, nausea, vomiting, diarrhea, and/or constipation), dry mouth, somnolence, sedation,  insomnia, activation, agitation, irritation, tremors, abnormal muscle movements or disorders, headache, sweating, possible bruising or rare bleeding, electrolyte and/or fluid abnormalities, change in blood pressure/heart rate/and or heart rhythm, sexual dysfunction, and metabolic adversities among others. Patient and/or guardian agreeable to call the office with any worsening of symptoms or onset of side effects, or if any concerns or questions arise.  The contact information for the office is made available to the patient and/or guardian.  Patient and/or guardian agreeable to call 911 or go to the nearest ER should they begin having any SI/HI, or if any urgent concerns arise. No medication side effects or related complaints today.    This APRN has discussed the benefits and risks of taking/continuing Lamictal (Lamotrigine).  The side effects of Lamictal can include a benign rash, blurred or double vision, dizziness, ataxia, sedation, headache, tremor, insomnia, poor coordination, fatigue,  nausea, vomiting, dyspepsia, rhinitis, infection, pharyngitis, asthenia, a rare but serious rash, rare multi-organ failure associated with Pires-Esteban Syndrome, toxic epidermal necrolysis, drug hypersensitivity syndrome, rare blood dyscrasias, rare aseptic meningitis, rare sudden unexplained deaths in people with epilepsy, withdrawal seizures upon abrupt withdrawal, and rare activation of suicidal ideation and behavior (suicidality).  This APRN has discussed that a very slow dose titration when starting, or changing doses, of Lamictal may reduce the incidence of skin rash and other side effects.  The dosage should not be titrated upwards or increased faster than recommended due to the possibility of the discussed side effects and risk of development of a skin rash (which can become life threatening).    This APRN has also discussed that if the patient stops taking the Lamictal for 3-5 days or longer, it will be necessary to  restart the drug with an initial dose titration, as rashes have been reported on reexposure.  If the patient and Provider decide to stop the Lamictal, the patient will follow the directions of this APRN/this office as a guided taper over about two weeks is appropriate due to the risk of relapse in bipolar disorder with those with a mood or bipolar disorder, the risk of seizures in those with epilepsy, and discontinuation symptoms upon rapid discontinuation of Lamictal.    The patient verbalizes understanding of benefits and risks as discussed, the patient/guardian feels the benefits outweigh the risks and is agreeable to continue/take Lamictal as discussed.  The patient is advised should any side effects or rash develops they are to stop the Lamictal immediately and contact this APRN/this office or go to the emergency department immediately.  The patient verbalizes understanding and agreement with treatment plan in their own words.      SUICIDE RISK ASSESSMENT AND SAFETY PLAN: Unalterable demographics and a history of mental health intervention indicate this patient is in a high risk category compared to the general population. At present, the patient denies active SI/HI, intentions, or plans at this time and agrees to seek immediate care should such thoughts develop. The patient verbalizes understanding of how to access emergency care if needed and agrees to do so. Consideration of suicide risk and protective factors such as history, current presentation, individual strengths and weaknesses, psychosocial and environmental stressors and variables, psychiatric illness and symptoms, medical conditions and pain, took place in this interview. Based on those considerations, the patient is determined: within individual baseline and presenting no imminent risk for suicide or homicide. Other recommendations: The patient does not meet the criteria for inpatient admission and is not a safety risk to self or others at today's  visit. Inpatient treatment offers no significant advantages over outpatient treatment for this patient at today's visit.  The patient was given ample time for questions and fully participated in treatment planning.  The patient was encouraged to call the clinic with any questions or concerns.  The patient was informed of access to emergency care. If patient were to develop any significant symptomatology, suicidal ideation, homicidal ideation, any concerns, or feel unsafe at any time they are to call the clinic and if unable to get immediate assistance should immediately call 911 or go to the nearest emergency room.  Patient contracted verbally for the following: If you are experiencing an emotional crisis or have thoughts of harming yourself or others, please go to your nearest local emergency room or call 911. Will continue to re-assess medication response and side effects frequently to establish efficacy and ensure safety. Risks, any black box warnings, side effects, off label usage, and benefits of medication and treatment discussed with patient, along with potential adverse side effects of current and/or newly prescribed medication, alternative treatment options, and OTC medications.  Patient verbalized understanding of potential risks, any off label use of medication, any black box warnings, and any side effects in their own words. The patient verbalized understanding and agreed to comply with the safety plan discussed in their own words.  Patient given the number to the office. Number also discussed of the 24- hour suicide hotline.           MEDS ORDERED DURING VISIT:  New Medications Ordered This Visit   Medications   • venlafaxine XR (EFFEXOR-XR) 150 MG 24 hr capsule     Sig: Take 1 capsule by mouth Daily.     Dispense:  30 capsule     Refill:  0   • lamoTRIgine (LaMICtal) 150 MG tablet     Sig: Take 1 tablet by mouth Daily.     Dispense:  30 tablet     Refill:  0   • busPIRone (BUSPAR) 30 MG tablet     Sig:  Take 1 tablet by mouth 2 (Two) Times a Day.     Dispense:  60 tablet     Refill:  0   • prazosin (MINIPRESS) 1 MG capsule     Sig: Take 1 capsule by mouth Every Night.     Dispense:  30 capsule     Refill:  0     Return in about 4 weeks (around 8/9/2022), or if symptoms worsen or fail to improve, for Next scheduled follow up and Recheck.         Progress toward goal: Not at goal    Functional Status: Moderate impairment     Prognosis: Fair with Ongoing Treatment      Treatment plan completed: 2/25/21.            This document has been electronically signed by GISELA Vaughn  July 12, 2022 14:01 EDT    Some of the data in this electronic note has been brought forward from a previous encounter, any necessary changes have been made, it has been reviewed by this APRN, and it is accurate.    Please note that portions of this note were completed with a voice recognition program.

## 2022-08-09 ENCOUNTER — TELEMEDICINE (OUTPATIENT)
Dept: PSYCHIATRY | Facility: CLINIC | Age: 57
End: 2022-08-09

## 2022-08-09 DIAGNOSIS — Z86.59 HISTORY OF POSTTRAUMATIC STRESS DISORDER (PTSD): Chronic | ICD-10-CM

## 2022-08-09 DIAGNOSIS — F51.5 NIGHTMARES: ICD-10-CM

## 2022-08-09 DIAGNOSIS — F41.9 ANXIETY DISORDER, UNSPECIFIED TYPE: Chronic | ICD-10-CM

## 2022-08-09 DIAGNOSIS — F33.1 MAJOR DEPRESSIVE DISORDER, RECURRENT EPISODE, MODERATE: Primary | Chronic | ICD-10-CM

## 2022-08-09 PROCEDURE — 99214 OFFICE O/P EST MOD 30 MIN: CPT | Performed by: NURSE PRACTITIONER

## 2022-08-09 RX ORDER — BUSPIRONE HYDROCHLORIDE 30 MG/1
30 TABLET ORAL 2 TIMES DAILY
Qty: 60 TABLET | Refills: 0 | Status: SHIPPED | OUTPATIENT
Start: 2022-08-09 | End: 2022-09-13 | Stop reason: SDUPTHER

## 2022-08-09 RX ORDER — LAMOTRIGINE 150 MG/1
150 TABLET ORAL DAILY
Qty: 30 TABLET | Refills: 0 | Status: SHIPPED | OUTPATIENT
Start: 2022-08-09 | End: 2022-09-13 | Stop reason: SDUPTHER

## 2022-08-09 RX ORDER — CYCLOBENZAPRINE HCL 5 MG
TABLET ORAL
COMMUNITY
Start: 2022-06-21 | End: 2022-09-13

## 2022-08-09 RX ORDER — VENLAFAXINE HYDROCHLORIDE 150 MG/1
150 CAPSULE, EXTENDED RELEASE ORAL DAILY
Qty: 30 CAPSULE | Refills: 0 | Status: SHIPPED | OUTPATIENT
Start: 2022-08-09 | End: 2022-09-13 | Stop reason: SDUPTHER

## 2022-08-09 RX ORDER — IBUPROFEN 800 MG/1
800 TABLET ORAL 3 TIMES DAILY
COMMUNITY
Start: 2022-07-26

## 2022-08-09 NOTE — PROGRESS NOTES
This provider is located at the [facility] (through [facility]), [address], [facility], [address] using a secure [employer] Video Visit through [employer]. Patient is being seen remotely via telehealth at their home address in [place], and stated they are in a secure environment for this session. The patient's condition being diagnosed/treated is appropriate for telemedicine. The provider identified herself as well as her credentials.   The patient, and/or patients guardian, consent to be seen remotely, and when consent is given they understand that the consent allows for patient identifiable information to be sent to a third party as needed.   They may refuse to be seen remotely at any time. The electronic data is encrypted and password protected, and the patient and/or guardian has been advised of the potential risks to privacy not withstanding such measures.    You have chosen to receive care through a telehealth visit.  Do you consent to use a video/audio connection for your medical care today? Yes      Subjective   Lovely Mixon is a 57 y.o. female who presents today for follow up    Chief Complaint:  Depression, anxiety, history of sleeping difficulties, nightmares, and history of PTSD follow-up    Accompanied by: The patient is interviewed alone at today's encounter    History of Present Illness:   The patient describes her mood as both depressed and anxious over the last few weeks.  The patient states she has had some situational stressors with her brother who passed away's children, and this has worsened her moods recently.  The patient rates her symptoms of depression at a 9/10 on a 0-10 scale, with 10 being the worst.  The patient rates her symptoms of anxiety at a 9/10 on a 0-10 scale, with 10 being the worst.  The patient reports her appetite as good.  The patient reports her sleep as good with the use of her current medications.  The patient reports she does have  nightmares still, but has not started prazosin because she was afraid to start the prazosin after talking with her sister.  The patient reports she feels her nightmares are tolerable, and does not want to take prazosin at this time.  The patient reports she does take melatonin OTC at bedtime, and melatonin OTC does help her sleep.  This APRN has discussed that melatonin can cause increased dreaming, or even bad dreams, and the patient reports she still does not want to stop melatonin because it helps with her sleep.  The patient reports finding a lump in her right breast, and her PCP has ordered a mammogram, and has referred her to a general surgeon to be evaluated for this.  The patient denies any other new medical problems or changes in medications since last appointment with this facility.  The patient reports compliance with her current medication regimen other than prazosin that she does not want to take.  The patient reports she just does not want to be on any more medications than she has to be on.  The patient denies any side effects or concerns from her current medication regimen.   The patient would like to not adjust or change her medications at this visit.  This APRN the patient have used a shared decision-making approach regarding the patient's treatment plan, and the patient reports she does feel her current medications are working for her, the patient does not want to change the Effexor XR or her other medications at this time.  The patient reports she is also in therapy weekly, and therapy has been going good.  The patient denies any suicidal or homicidal ideations, plans, or intent at today's encounter and is convincing.  The patient denies any auditory hallucinations or visual hallucinations.  The patient does not endorse any significant symptoms consistent with lea or psychosis at today's encounter.      Primary Care Provider:  Mine Abarca    Prior Psychiatric Medications:  -Temazepam  30 mg by mouth once daily at bedtime - states she has taken this since around 2014 for cerebral palsy and also sleep  -Zoloft - states was taking 100 mg and she was still having anxiety and mood symptoms  -Effexor XR 75 mg by mouth once daily  -Buspirone 10 mg by mouth twice daily  -Lamictal      Last Menstrual Period:  Uterine ablation on 1/6/21.  Denies any chance of pregnancy.  The patient was educated that her prescribed medications can have potential risk to a developing fetus. The patient is advised to contact this APRN/this office if she becomes pregnant or plans to become pregnant.  Pt verbalizes understanding and acknowledged agreement with this plan in her own words.          The following portions of the patient's history were reviewed and updated as appropriate: allergies, current medications, past family history, past medical history, past social history, past surgical history and problem list.          Past Medical History:  Past Medical History:   Diagnosis Date   • Anxiety    • Cerebral palsy (HCC)    • Depression    • Lazy eye    • PTSD (post-traumatic stress disorder)    • Seasonal allergies    • Vision decreased        Social History:  Social History     Socioeconomic History   • Marital status:    Tobacco Use   • Smoking status: Current Every Day Smoker     Packs/day: 0.50   • Smokeless tobacco: Never Used   Substance and Sexual Activity   • Alcohol use: Not Currently     Comment: States an occasional social drink, but not often   • Drug use: Never   • Sexual activity: Not Currently     Partners: Male       Family History:  Family History   Problem Relation Age of Onset   • Heart attack Mother    • Stroke Father    • Alcohol abuse Father    • Anxiety disorder Sister    • Depression Sister    • Alcohol abuse Sister    • Breast cancer Other        Past Surgical History:  Past Surgical History:   Procedure Laterality Date   • CATARACT EXTRACTION     • ENDOMETRIAL ABLATION     • LEG SURGERY       bilateral lower extremity surgeries due to cerebral palsy complications       Problem List:  There is no problem list on file for this patient.      Allergy:   No Known Allergies     Current Medications:   Current Outpatient Medications   Medication Sig Dispense Refill   • busPIRone (BUSPAR) 30 MG tablet Take 1 tablet by mouth 2 (Two) Times a Day. 60 tablet 0   • lamoTRIgine (LaMICtal) 150 MG tablet Take 1 tablet by mouth Daily. 30 tablet 0   • venlafaxine XR (EFFEXOR-XR) 150 MG 24 hr capsule Take 1 capsule by mouth Daily. 30 capsule 0   • cyclobenzaprine (FLEXERIL) 5 MG tablet TAKE 1 TABLET BY MOUTH ONCE DAILY AT BEDTIME IF NEEDED FOR MUSCLE SPASMS / TENSION     • ibuprofen (ADVIL,MOTRIN) 800 MG tablet Take 800 mg by mouth 3 (Three) Times a Day.     • Loratadine 10 MG capsule Take 10 mg by mouth Daily.     • Melatonin 10 MG tablet dispersible Place 10 mg on the tongue every night at bedtime.     • norethindrone (AYGESTIN) 5 MG tablet Take 5 mg by mouth Daily.     • temazepam (RESTORIL) 30 MG capsule Take 30 mg by mouth At Night As Needed for Sleep.       No current facility-administered medications for this visit.       Review of Symptoms:    Review of Systems   Psychiatric/Behavioral: Positive for decreased concentration, sleep disturbance, depressed mood and stress. Negative for agitation, behavioral problems, dysphoric mood, hallucinations, self-injury, suicidal ideas and negative for hyperactivity. The patient is nervous/anxious.          Physical Exam:   There were no vitals taken for this visit. There is no height or weight on file to calculate BMI.   Due to the remote nature of this encounter (virtual encounter), vitals were unable to be obtained.  Height stated at 65 inches.  Weight stated at around 169.        Physical Exam  Constitutional:       Appearance: She is well-developed.   Neurological:      Mental Status: She is alert and oriented to person, place, and time.   Psychiatric:         Attention  and Perception: Attention normal. She is attentive.         Mood and Affect: Mood is depressed. Affect is tearful.         Speech: Speech normal.         Behavior: Behavior normal. Behavior is cooperative.         Thought Content: Thought content normal. Thought content is not paranoid or delusional. Thought content does not include homicidal or suicidal ideation. Thought content does not include homicidal or suicidal plan.         Cognition and Memory: Cognition and memory normal.         Judgment: Judgment normal. Judgment is not impulsive or inappropriate.         Mental Status Exam:   Hygiene:   good  Cooperation:  Cooperative  Eye Contact:  Good  Psychomotor Behavior:  Appropriate  Affect:  Tearful  Mood: depressed  Hopelessness: Denies  Speech:  Normal  Thought Process:  Linear  Thought Content:  Mood congruent  Suicidal:  None  Homicidal:  None  Hallucinations:  None  Delusion:  None  Memory:  Intact  Orientation:  Person, Place, Time and Situation  Reliability:  good  Insight:  Good  Judgement:  Good  Impulse Control:  Good  Physical/Medical Issues:  No            Lab Results:   No visits with results within 1 Month(s) from this visit.   Latest known visit with results is:   No results found for any previous visit.         Assessment & Plan   Problems Addressed this Visit    None     Visit Diagnoses     Major depressive disorder, recurrent episode, moderate (HCC)  (Chronic)   -  Primary    Relevant Medications    venlafaxine XR (EFFEXOR-XR) 150 MG 24 hr capsule    lamoTRIgine (LaMICtal) 150 MG tablet    busPIRone (BUSPAR) 30 MG tablet    Anxiety disorder, unspecified type  (Chronic)       Relevant Medications    venlafaxine XR (EFFEXOR-XR) 150 MG 24 hr capsule    busPIRone (BUSPAR) 30 MG tablet    History of posttraumatic stress disorder (PTSD)  (Chronic)       Relevant Medications    venlafaxine XR (EFFEXOR-XR) 150 MG 24 hr capsule    Nightmares        Relevant Medications    venlafaxine XR (EFFEXOR-XR) 150  MG 24 hr capsule    busPIRone (BUSPAR) 30 MG tablet      Diagnoses       Codes Comments    Major depressive disorder, recurrent episode, moderate (HCC)    -  Primary ICD-10-CM: F33.1  ICD-9-CM: 296.32     Anxiety disorder, unspecified type     ICD-10-CM: F41.9  ICD-9-CM: 300.00     History of posttraumatic stress disorder (PTSD)     ICD-10-CM: Z86.59  ICD-9-CM: V11.8     Nightmares     ICD-10-CM: F51.5  ICD-9-CM: 307.47           Visit Diagnoses:    ICD-10-CM ICD-9-CM   1. Major depressive disorder, recurrent episode, moderate (HCC)  F33.1 296.32   2. Anxiety disorder, unspecified type  F41.9 300.00   3. History of posttraumatic stress disorder (PTSD)  Z86.59 V11.8   4. Nightmares  F51.5 307.47          GOALS:  Short Term Goals: Patient will be compliant with medication, and patient will have no significant medication related side effects.  Patient will be engaged in psychotherapy as indicated.  Patient will report subjective improvement of symptoms.  Long term goals: To stabilize mood and treat/improve subjective symptoms, the patient will stay out of the hospital, the patient will be at an optimal level of functioning, and the patient will take all medications as prescribed.  The patient verbalized understanding and agreement with goals that were mutually set.      TREATMENT PLAN: Continue supportive psychotherapy efforts and medications as indicated.  Medication and treatment options, both pharmacological and non-pharmacological treatment options, discussed during today's visit, including any off label use of medication. Patient acknowledged and verbally consented with current treatment plan and was educated on the importance of compliance with treatment and follow-up appointments.      - Continue Effexor  mg by mouth once daily in the mornings for mood.  - Continue buspirone 30 mg by mouth two times daily for mood.   - Continue Lamictal 150 mg by mouth once daily as an adjunct for mood.  - Discontinue  prazosin 1 mg by mouth once nightly at bedtime for nightmares as patient has not started taking it and has decided against taking Prazosin at this time.      MEDICATION ISSUES:  Discussed medication options and treatment plan of prescribed medication, any off label use of medication, as well as the risks, benefits, any black box warnings including increased suicidality, and side effects including but not limited to potential falls, dizziness, possible impaired driving, GI side effects (change in appetite, abdominal discomfort, nausea, vomiting, diarrhea, and/or constipation), dry mouth, somnolence, sedation, insomnia, activation, agitation, irritation, tremors, abnormal muscle movements or disorders, headache, sweating, possible bruising or rare bleeding, electrolyte and/or fluid abnormalities, change in blood pressure/heart rate/and or heart rhythm, sexual dysfunction, and metabolic adversities among others. Patient and/or guardian agreeable to call the office with any worsening of symptoms or onset of side effects, or if any concerns or questions arise.  The contact information for the office is made available to the patient and/or guardian.  Patient and/or guardian agreeable to call 911 or go to the nearest ER should they begin having any SI/HI, or if any urgent concerns arise. No medication side effects or related complaints today.    This APRN has discussed the benefits and risks of taking/continuing Lamictal (Lamotrigine).  The side effects of Lamictal can include a benign rash, blurred or double vision, dizziness, ataxia, sedation, headache, tremor, insomnia, poor coordination, fatigue,  nausea, vomiting, dyspepsia, rhinitis, infection, pharyngitis, asthenia, a rare but serious rash, rare multi-organ failure associated with Pires-Esteban Syndrome, toxic epidermal necrolysis, drug hypersensitivity syndrome, rare blood dyscrasias, rare aseptic meningitis, rare sudden unexplained deaths in people with  epilepsy, withdrawal seizures upon abrupt withdrawal, and rare activation of suicidal ideation and behavior (suicidality).  This APRN has discussed that a very slow dose titration when starting, or changing doses, of Lamictal may reduce the incidence of skin rash and other side effects.  The dosage should not be titrated upwards or increased faster than recommended due to the possibility of the discussed side effects and risk of development of a skin rash (which can become life threatening).    This APRN has also discussed that if the patient stops taking the Lamictal for 3-5 days or longer, it will be necessary to restart the drug with an initial dose titration, as rashes have been reported on reexposure.  If the patient and Provider decide to stop the Lamictal, the patient will follow the directions of this APRN/this office as a guided taper over about two weeks is appropriate due to the risk of relapse in bipolar disorder with those with a mood or bipolar disorder, the risk of seizures in those with epilepsy, and discontinuation symptoms upon rapid discontinuation of Lamictal.    The patient verbalizes understanding of benefits and risks as discussed, the patient/guardian feels the benefits outweigh the risks and is agreeable to continue/take Lamictal as discussed.  The patient is advised should any side effects or rash develops they are to stop the Lamictal immediately and contact this APRN/this office or go to the emergency department immediately.  The patient verbalizes understanding and agreement with treatment plan in their own words.      SUICIDE RISK ASSESSMENT AND SAFETY PLAN: Unalterable demographics and a history of mental health intervention indicate this patient is in a high risk category compared to the general population. At present, the patient denies active SI/HI, intentions, or plans at this time and agrees to seek immediate care should such thoughts develop. The patient verbalizes understanding  of how to access emergency care if needed and agrees to do so. Consideration of suicide risk and protective factors such as history, current presentation, individual strengths and weaknesses, psychosocial and environmental stressors and variables, psychiatric illness and symptoms, medical conditions and pain, took place in this interview. Based on those considerations, the patient is determined: within individual baseline and presenting no imminent risk for suicide or homicide. Other recommendations: The patient does not meet the criteria for inpatient admission and is not a safety risk to self or others at today's visit. Inpatient treatment offers no significant advantages over outpatient treatment for this patient at today's visit.  The patient was given ample time for questions and fully participated in treatment planning.  The patient was encouraged to call the clinic with any questions or concerns.  The patient was informed of access to emergency care. If patient were to develop any significant symptomatology, suicidal ideation, homicidal ideation, any concerns, or feel unsafe at any time they are to call the clinic and if unable to get immediate assistance should immediately call 911 or go to the nearest emergency room.  Patient contracted verbally for the following: If you are experiencing an emotional crisis or have thoughts of harming yourself or others, please go to your nearest local emergency room or call 911. Will continue to re-assess medication response and side effects frequently to establish efficacy and ensure safety. Risks, any black box warnings, side effects, off label usage, and benefits of medication and treatment discussed with patient, along with potential adverse side effects of current and/or newly prescribed medication, alternative treatment options, and OTC medications.  Patient verbalized understanding of potential risks, any off label use of medication, any black box warnings, and any  side effects in their own words. The patient verbalized understanding and agreed to comply with the safety plan discussed in their own words.  Patient given the number to the office. Number also discussed of the 24- hour suicide hotline.           MEDS ORDERED DURING VISIT:  New Medications Ordered This Visit   Medications   • venlafaxine XR (EFFEXOR-XR) 150 MG 24 hr capsule     Sig: Take 1 capsule by mouth Daily.     Dispense:  30 capsule     Refill:  0   • lamoTRIgine (LaMICtal) 150 MG tablet     Sig: Take 1 tablet by mouth Daily.     Dispense:  30 tablet     Refill:  0   • busPIRone (BUSPAR) 30 MG tablet     Sig: Take 1 tablet by mouth 2 (Two) Times a Day.     Dispense:  60 tablet     Refill:  0     Return in about 3 weeks (around 8/30/2022), or if symptoms worsen or fail to improve, for Next scheduled follow up and Recheck.         Progress toward goal: Not at goal    Functional Status: Moderate impairment     Prognosis: Fair with Ongoing Treatment      Treatment plan completed: 2/25/21.            This document has been electronically signed by GISELA Vaughn  August 9, 2022 13:32 EDT    Some of the data in this electronic note has been brought forward from a previous encounter, any necessary changes have been made, it has been reviewed by this APRN, and it is accurate.    Please note that portions of this note were completed with a voice recognition program.

## 2022-09-13 ENCOUNTER — TELEMEDICINE (OUTPATIENT)
Dept: PSYCHIATRY | Facility: CLINIC | Age: 57
End: 2022-09-13

## 2022-09-13 DIAGNOSIS — G47.9 SLEEPING DIFFICULTIES: ICD-10-CM

## 2022-09-13 DIAGNOSIS — F33.1 MAJOR DEPRESSIVE DISORDER, RECURRENT EPISODE, MODERATE: Primary | Chronic | ICD-10-CM

## 2022-09-13 DIAGNOSIS — Z86.59 HISTORY OF POSTTRAUMATIC STRESS DISORDER (PTSD): Chronic | ICD-10-CM

## 2022-09-13 DIAGNOSIS — F41.9 ANXIETY DISORDER, UNSPECIFIED TYPE: Chronic | ICD-10-CM

## 2022-09-13 PROCEDURE — 99214 OFFICE O/P EST MOD 30 MIN: CPT | Performed by: NURSE PRACTITIONER

## 2022-09-13 RX ORDER — BUSPIRONE HYDROCHLORIDE 30 MG/1
30 TABLET ORAL 2 TIMES DAILY
Qty: 60 TABLET | Refills: 0 | Status: SHIPPED | OUTPATIENT
Start: 2022-09-13 | End: 2022-10-11 | Stop reason: SDUPTHER

## 2022-09-13 RX ORDER — METHOCARBAMOL 500 MG/1
500 TABLET, FILM COATED ORAL EVERY 8 HOURS
COMMUNITY
Start: 2022-08-25

## 2022-09-13 RX ORDER — LAMOTRIGINE 150 MG/1
150 TABLET ORAL DAILY
Qty: 30 TABLET | Refills: 0 | Status: SHIPPED | OUTPATIENT
Start: 2022-09-13 | End: 2022-10-11 | Stop reason: SDUPTHER

## 2022-09-13 RX ORDER — VENLAFAXINE HYDROCHLORIDE 150 MG/1
150 CAPSULE, EXTENDED RELEASE ORAL DAILY
Qty: 30 CAPSULE | Refills: 0 | Status: SHIPPED | OUTPATIENT
Start: 2022-09-13 | End: 2022-10-11 | Stop reason: SDUPTHER

## 2022-10-11 ENCOUNTER — TELEMEDICINE (OUTPATIENT)
Dept: PSYCHIATRY | Facility: CLINIC | Age: 57
End: 2022-10-11

## 2022-10-11 DIAGNOSIS — F33.1 MAJOR DEPRESSIVE DISORDER, RECURRENT EPISODE, MODERATE: Primary | Chronic | ICD-10-CM

## 2022-10-11 DIAGNOSIS — F41.9 ANXIETY DISORDER, UNSPECIFIED TYPE: Chronic | ICD-10-CM

## 2022-10-11 DIAGNOSIS — F43.10 PTSD (POST-TRAUMATIC STRESS DISORDER): Chronic | ICD-10-CM

## 2022-10-11 PROCEDURE — 99214 OFFICE O/P EST MOD 30 MIN: CPT | Performed by: NURSE PRACTITIONER

## 2022-10-11 RX ORDER — VENLAFAXINE HYDROCHLORIDE 150 MG/1
150 CAPSULE, EXTENDED RELEASE ORAL DAILY
Qty: 30 CAPSULE | Refills: 0 | Status: SHIPPED | OUTPATIENT
Start: 2022-10-11 | End: 2022-11-08

## 2022-10-11 RX ORDER — LAMOTRIGINE 150 MG/1
150 TABLET ORAL DAILY
Qty: 30 TABLET | Refills: 0 | Status: SHIPPED | OUTPATIENT
Start: 2022-10-11 | End: 2022-11-08 | Stop reason: SDUPTHER

## 2022-10-11 RX ORDER — BUSPIRONE HYDROCHLORIDE 30 MG/1
30 TABLET ORAL 2 TIMES DAILY
Qty: 60 TABLET | Refills: 0 | Status: SHIPPED | OUTPATIENT
Start: 2022-10-11 | End: 2022-11-08 | Stop reason: SDUPTHER

## 2022-10-11 NOTE — PROGRESS NOTES
This provider is located at the [facility] (through [facility]), [address], [place], [address] using a secure [email or web address] Video Visit through [employer]. Patient is being seen remotely via telehealth at their home address in [place], and stated they are in a secure environment for this session. The patient's condition being diagnosed/treated is appropriate for telemedicine. The provider identified herself as well as her credentials.   The patient, and/or patients guardian, consent to be seen remotely, and when consent is given they understand that the consent allows for patient identifiable information to be sent to a third party as needed.   They may refuse to be seen remotely at any time. The electronic data is encrypted and password protected, and the patient and/or guardian has been advised of the potential risks to privacy not withstanding such measures.    You have chosen to receive care through a telehealth visit.  Do you consent to use a video/audio connection for your medical care today? Yes    Subjective   Lovely Mixon is a 57 y.o. female who presents today for follow up    Chief Complaint:  Depression, anxiety, history of sleeping difficulties, and history of PTSD follow-up    Accompanied by: The patient is interviewed alone at today's encounter    History of Present Illness:   The patient describes her mood as doing okay over the last few weeks, but reports she has had some situational stressors worsened her mood some which are now starting to improve.  The patient states she recently had some situational stressors with the girl she has custody of and is raising as her daughter.  The patient reports this child is 14 years old and has had some behavioral issues at home and school, and reports the patient had to be hospitalized for an inpatient psychiatry stay recently at [facility] due to her moods and behaviors.  The patient reports she is not sure if she is going  to be able to keep this child in her home still, but has not made that decision as of yet, and the patient's recent behavioral concerns and hospitalization did worsen her moods overall.  The patient reports if she does decide she is not able to keep this child any longer the child's adult older sister is wanting to take custody of the child.  The patient reports both her symptoms of depression and anxiety have been very high, but does not rate them on a scale of 0-10 with 10 being the worst at today's encounter.  The patient reports her sister has been helping her through this, and has been a good support for her during this time.  The patient reports she is also still attending therapy, and therapy has been beneficial for her during this stressful time in her life.  The patient reports her appetite as improved recently.  The patient reports her sleep as improved recently and doing good recently.  The patient denies any new medical problems or changes in medications since last appointment with this facility.  The patient reports compliance with her current medication regimen.  The patient denies any side effects from her current medication regimen.  The patient denies any abnormal muscle movements or tics.   The patient reports she would like to not adjust or change her medications at this visit as her moods are returning back to her recent typical baseline now that her recent situational stressors have improved.  The patient denies any suicidal or homicidal ideations, plans, or intent at today's encounter and is convincing.  The patient denies any auditory hallucinations or visual hallucinations.  The patient does not endorse any significant symptoms consistent with lea or psychosis at today's encounter.      Primary Care Provider:  Mine Abarca    Prior Psychiatric Medications:  -Temazepam 30 mg by mouth once daily at bedtime - states she has taken this since around 2014 for cerebral palsy and also  sleep  -Zoloft - states was taking 100 mg and she was still having anxiety and mood symptoms  -Effexor XR 75 mg by mouth once daily  -Buspirone 10 mg by mouth twice daily  -Lamictal      Last Menstrual Period:  None since uterine ablation on 1/6/21.  Denies any chance of pregnancy.  The patient was educated that her prescribed medications can have potential risk to a developing fetus. The patient is advised to contact this APRN/this office if she becomes pregnant or plans to become pregnant.  Pt verbalizes understanding and acknowledged agreement with this plan in her own words.          The following portions of the patient's history were reviewed and updated as appropriate: allergies, current medications, past family history, past medical history, past social history, past surgical history and problem list.          Past Medical History:  Past Medical History:   Diagnosis Date   • Anxiety    • Cerebral palsy (HCC)    • Depression    • Lazy eye    • PTSD (post-traumatic stress disorder)    • Seasonal allergies    • Vision decreased        Social History:  Social History     Socioeconomic History   • Marital status:    Tobacco Use   • Smoking status: Every Day     Packs/day: 0.50     Types: Cigarettes   • Smokeless tobacco: Never   Substance and Sexual Activity   • Alcohol use: Not Currently     Comment: States an occasional social drink, but not often   • Drug use: Never   • Sexual activity: Not Currently     Partners: Male       Family History:  Family History   Problem Relation Age of Onset   • Heart attack Mother    • Stroke Father    • Alcohol abuse Father    • Anxiety disorder Sister    • Depression Sister    • Alcohol abuse Sister    • Breast cancer Other        Past Surgical History:  Past Surgical History:   Procedure Laterality Date   • CATARACT EXTRACTION     • ENDOMETRIAL ABLATION     • LEG SURGERY      bilateral lower extremity surgeries due to cerebral palsy complications       Problem  List:  There is no problem list on file for this patient.      Allergy:   No Known Allergies     Current Medications:   Current Outpatient Medications   Medication Sig Dispense Refill   • busPIRone (BUSPAR) 30 MG tablet Take 1 tablet by mouth 2 (Two) Times a Day. 60 tablet 0   • lamoTRIgine (LaMICtal) 150 MG tablet Take 1 tablet by mouth Daily. 30 tablet 0   • venlafaxine XR (EFFEXOR-XR) 150 MG 24 hr capsule Take 1 capsule by mouth Daily. 30 capsule 0   • ibuprofen (ADVIL,MOTRIN) 800 MG tablet Take 800 mg by mouth 3 (Three) Times a Day.     • Loratadine 10 MG capsule Take 10 mg by mouth Daily.     • Melatonin 10 MG tablet dispersible Place 10 mg on the tongue every night at bedtime.     • methocarbamol (ROBAXIN) 500 MG tablet Take 500 mg by mouth Every 8 (Eight) Hours.     • norethindrone (AYGESTIN) 5 MG tablet Take 5 mg by mouth Daily.     • temazepam (RESTORIL) 30 MG capsule Take 30 mg by mouth At Night As Needed for Sleep.       No current facility-administered medications for this visit.       Review of Symptoms:    Review of Systems   Psychiatric/Behavioral: Positive for decreased concentration, sleep disturbance, depressed mood and stress. Negative for agitation, behavioral problems, dysphoric mood, hallucinations, self-injury, suicidal ideas and negative for hyperactivity. The patient is nervous/anxious.          Physical Exam:   There were no vitals taken for this visit. There is no height or weight on file to calculate BMI.   Due to the remote nature of this encounter (virtual encounter), vitals were unable to be obtained.  Height stated at 65 inches.  Weight stated at around 170 pounds.        Physical Exam  Constitutional:       Appearance: She is well-developed.   Neurological:      Mental Status: She is alert and oriented to person, place, and time.   Psychiatric:         Attention and Perception: Attention normal. She is attentive.         Mood and Affect: Affect normal. Mood is anxious and depressed.          Speech: Speech normal.         Behavior: Behavior normal. Behavior is cooperative.         Thought Content: Thought content normal. Thought content is not paranoid or delusional. Thought content does not include homicidal or suicidal ideation. Thought content does not include homicidal or suicidal plan.         Cognition and Memory: Cognition and memory normal.         Judgment: Judgment normal. Judgment is not impulsive or inappropriate.         Mental Status Exam:   Hygiene:   good  Cooperation:  Cooperative  Eye Contact:  Good  Psychomotor Behavior:  Appropriate  Affect:  Appropriate  Mood: depressed and anxious  Hopelessness: Denies  Speech:  Normal  Thought Process:  Linear  Thought Content:  Mood congruent  Suicidal:  None  Homicidal:  None  Hallucinations:  None  Delusion:  None  Memory:  Intact  Orientation:  Person, Place, Time and Situation  Reliability:  good  Insight:  Good  Judgement:  Good  Impulse Control:  Good  Physical/Medical Issues:  No            Lab Results:   No visits with results within 1 Month(s) from this visit.   Latest known visit with results is:   No results found for any previous visit.         Assessment & Plan   Problems Addressed this Visit    None  Visit Diagnoses     Major depressive disorder, recurrent episode, moderate (HCC)  (Chronic)   -  Primary    Relevant Medications    busPIRone (BUSPAR) 30 MG tablet    lamoTRIgine (LaMICtal) 150 MG tablet    venlafaxine XR (EFFEXOR-XR) 150 MG 24 hr capsule    Anxiety disorder, unspecified type  (Chronic)       Relevant Medications    busPIRone (BUSPAR) 30 MG tablet    venlafaxine XR (EFFEXOR-XR) 150 MG 24 hr capsule    PTSD (post-traumatic stress disorder)  (Chronic)       Relevant Medications    busPIRone (BUSPAR) 30 MG tablet    venlafaxine XR (EFFEXOR-XR) 150 MG 24 hr capsule      Diagnoses       Codes Comments    Major depressive disorder, recurrent episode, moderate (HCC)    -  Primary ICD-10-CM: F33.1  ICD-9-CM: 296.32      Anxiety disorder, unspecified type     ICD-10-CM: F41.9  ICD-9-CM: 300.00     PTSD (post-traumatic stress disorder)     ICD-10-CM: F43.10  ICD-9-CM: 309.81           Visit Diagnoses:    ICD-10-CM ICD-9-CM   1. Major depressive disorder, recurrent episode, moderate (HCC)  F33.1 296.32   2. Anxiety disorder, unspecified type  F41.9 300.00   3. PTSD (post-traumatic stress disorder)  F43.10 309.81          GOALS:  Short Term Goals: Patient will be compliant with medication, and patient will have no significant medication related side effects.  Patient will be engaged in psychotherapy as indicated.  Patient will report subjective improvement of symptoms.  Long term goals: To stabilize mood and treat/improve subjective symptoms, the patient will stay out of the hospital, the patient will be at an optimal level of functioning, and the patient will take all medications as prescribed.  The patient verbalized understanding and agreement with goals that were mutually set.      TREATMENT PLAN: Continue supportive psychotherapy efforts and medications as indicated.  Medication and treatment options, both pharmacological and non-pharmacological treatment options, discussed during today's visit, including any off label use of medication. Patient acknowledged and verbally consented with current treatment plan and was educated on the importance of compliance with treatment and follow-up appointments.      -Continue Effexor  mg by mouth once daily in the mornings for mood.  -Continue buspirone 30 mg by mouth two times daily for mood.   -Continue Lamictal 150 mg by mouth once daily as an adjunct for mood.      MEDICATION ISSUES:  Discussed medication options and treatment plan of prescribed medication, any off label use of medication, as well as the risks, benefits, any black box warnings including increased suicidality, and side effects including but not limited to potential falls, dizziness, possible impaired driving, GI side  effects (change in appetite, abdominal discomfort, nausea, vomiting, diarrhea, and/or constipation), dry mouth, somnolence, sedation, insomnia, activation, agitation, irritation, tremors, abnormal muscle movements or disorders, headache, sweating, possible bruising or rare bleeding, electrolyte and/or fluid abnormalities, change in blood pressure/heart rate/and or heart rhythm, sexual dysfunction, and metabolic adversities among others. Patient and/or guardian agreeable to call the office with any worsening of symptoms or onset of side effects, or if any concerns or questions arise.  The contact information for the office is made available to the patient and/or guardian.  Patient and/or guardian agreeable to call 911 or go to the nearest ER should they begin having any SI/HI, or if any urgent concerns arise. No medication side effects or related complaints today.    This APRN has discussed the benefits and risks of taking/continuing Lamictal (Lamotrigine).  The side effects of Lamictal can include a benign rash, blurred or double vision, dizziness, ataxia, sedation, headache, tremor, insomnia, poor coordination, fatigue,  nausea, vomiting, dyspepsia, rhinitis, infection, pharyngitis, asthenia, a rare but serious rash, rare multi-organ failure associated with Pires-Esteabn Syndrome, toxic epidermal necrolysis, drug hypersensitivity syndrome, rare blood dyscrasias, rare aseptic meningitis, rare sudden unexplained deaths in people with epilepsy, withdrawal seizures upon abrupt withdrawal, and rare activation of suicidal ideation and behavior (suicidality).  This APRN has discussed that a very slow dose titration when starting, or changing doses, of Lamictal may reduce the incidence of skin rash and other side effects.  The dosage should not be titrated upwards or increased faster than recommended due to the possibility of the discussed side effects and risk of development of a skin rash (which can become life  threatening).    This APRN has also discussed that if the patient stops taking the Lamictal for 3-5 days or longer, it will be necessary to restart the drug with an initial dose titration, as rashes have been reported on reexposure.  If the patient and Provider decide to stop the Lamictal, the patient will follow the directions of this APRN/this office as a guided taper over about two weeks is appropriate due to the risk of relapse in bipolar disorder with those with a mood or bipolar disorder, the risk of seizures in those with epilepsy, and discontinuation symptoms upon rapid discontinuation of Lamictal.    The patient verbalizes understanding of benefits and risks as discussed, the patient/guardian feels the benefits outweigh the risks and is agreeable to continue/take Lamictal as discussed.  The patient is advised should any side effects or rash develops they are to stop the Lamictal immediately and contact this APRN/this office or go to the emergency department immediately.  The patient verbalizes understanding and agreement with treatment plan in their own words.      SUICIDE RISK ASSESSMENT AND SAFETY PLAN: Unalterable demographics and a history of mental health intervention indicate this patient is in a high risk category compared to the general population. At present, the patient denies active SI/HI, intentions, or plans at this time and agrees to seek immediate care should such thoughts develop. The patient verbalizes understanding of how to access emergency care if needed and agrees to do so. Consideration of suicide risk and protective factors such as history, current presentation, individual strengths and weaknesses, psychosocial and environmental stressors and variables, psychiatric illness and symptoms, medical conditions and pain, took place in this interview. Based on those considerations, the patient is determined: within individual baseline and presenting no imminent risk for suicide or homicide.  Other recommendations: The patient does not meet the criteria for inpatient admission and is not a safety risk to self or others at today's visit. Inpatient treatment offers no significant advantages over outpatient treatment for this patient at today's visit.  The patient was given ample time for questions and fully participated in treatment planning.  The patient was encouraged to call the clinic with any questions or concerns.  The patient was informed of access to emergency care. If patient were to develop any significant symptomatology, suicidal ideation, homicidal ideation, any concerns, or feel unsafe at any time they are to call the clinic and if unable to get immediate assistance should immediately call 911 or go to the nearest emergency room.  Patient contracted verbally for the following: If you are experiencing an emotional crisis or have thoughts of harming yourself or others, please go to your nearest local emergency room or call 911. Will continue to re-assess medication response and side effects frequently to establish efficacy and ensure safety. Risks, any black box warnings, side effects, off label usage, and benefits of medication and treatment discussed with patient, along with potential adverse side effects of current and/or newly prescribed medication, alternative treatment options, and OTC medications.  Patient verbalized understanding of potential risks, any off label use of medication, any black box warnings, and any side effects in their own words. The patient verbalized understanding and agreed to comply with the safety plan discussed in their own words.  Patient given the number to the office. Number also discussed of the 24- hour suicide hotline.           MEDS ORDERED DURING VISIT:  New Medications Ordered This Visit   Medications   • busPIRone (BUSPAR) 30 MG tablet     Sig: Take 1 tablet by mouth 2 (Two) Times a Day.     Dispense:  60 tablet     Refill:  0   • lamoTRIgine (LaMICtal)  150 MG tablet     Sig: Take 1 tablet by mouth Daily.     Dispense:  30 tablet     Refill:  0   • venlafaxine XR (EFFEXOR-XR) 150 MG 24 hr capsule     Sig: Take 1 capsule by mouth Daily.     Dispense:  30 capsule     Refill:  0     Return in about 4 weeks (around 11/8/2022), or if symptoms worsen or fail to improve, for Next scheduled follow up and Recheck.         Progress toward goal: Not at goal    Functional Status: Moderate impairment     Prognosis: Fair with Ongoing Treatment      Treatment plan completed: 2/25/21.            This document has been electronically signed by GISELA Vaughn  October 11, 2022 12:11 EDT    Some of the data in this electronic note has been brought forward from a previous encounter, any necessary changes have been made, it has been reviewed by this APRN, and it is accurate.    Please note that portions of this note were completed with a voice recognition program.

## 2022-11-08 ENCOUNTER — TELEMEDICINE (OUTPATIENT)
Dept: PSYCHIATRY | Facility: CLINIC | Age: 57
End: 2022-11-08

## 2022-11-08 DIAGNOSIS — F41.9 ANXIETY DISORDER, UNSPECIFIED TYPE: Chronic | ICD-10-CM

## 2022-11-08 DIAGNOSIS — F43.10 PTSD (POST-TRAUMATIC STRESS DISORDER): Chronic | ICD-10-CM

## 2022-11-08 DIAGNOSIS — F33.1 MAJOR DEPRESSIVE DISORDER, RECURRENT EPISODE, MODERATE: Primary | Chronic | ICD-10-CM

## 2022-11-08 DIAGNOSIS — G47.9 SLEEPING DIFFICULTIES: ICD-10-CM

## 2022-11-08 PROCEDURE — 99214 OFFICE O/P EST MOD 30 MIN: CPT | Performed by: NURSE PRACTITIONER

## 2022-11-08 RX ORDER — VILAZODONE HYDROCHLORIDE 10 MG/1
10 TABLET ORAL DAILY
Qty: 30 TABLET | Refills: 0 | Status: SHIPPED | OUTPATIENT
Start: 2022-11-08 | End: 2022-12-08 | Stop reason: SDUPTHER

## 2022-11-08 RX ORDER — FEXOFENADINE HCL 180 MG/1
TABLET ORAL EVERY 24 HOURS
COMMUNITY

## 2022-11-08 RX ORDER — TEMAZEPAM 30 MG/1
CAPSULE ORAL
COMMUNITY
Start: 2022-06-16

## 2022-11-08 RX ORDER — VENLAFAXINE HYDROCHLORIDE 37.5 MG/1
CAPSULE, EXTENDED RELEASE ORAL
Qty: 15 CAPSULE | Refills: 0 | Status: SHIPPED | OUTPATIENT
Start: 2022-11-08 | End: 2022-12-08

## 2022-11-08 RX ORDER — LAMOTRIGINE 150 MG/1
150 TABLET ORAL DAILY
Qty: 30 TABLET | Refills: 0 | Status: SHIPPED | OUTPATIENT
Start: 2022-11-08 | End: 2022-12-08 | Stop reason: SDUPTHER

## 2022-11-08 RX ORDER — BUSPIRONE HYDROCHLORIDE 30 MG/1
30 TABLET ORAL 2 TIMES DAILY
Qty: 60 TABLET | Refills: 0 | Status: SHIPPED | OUTPATIENT
Start: 2022-11-08 | End: 2022-12-08 | Stop reason: SDUPTHER

## 2022-11-08 NOTE — PROGRESS NOTES
This provider is located at the [facility] (through [facility]), [address], [place], [address] using a secure [employer] Website [employer] Video Visit through [employer]. Patient is being seen remotely via telehealth at their home address in [place], and stated they are in a secure environment for this session. The patient's condition being diagnosed/treated is appropriate for telemedicine. The provider identified herself as well as her credentials.   The patient, and/or patients guardian, consent to be seen remotely, and when consent is given they understand that the consent allows for patient identifiable information to be sent to a third party as needed.   They may refuse to be seen remotely at any time. The electronic data is encrypted and password protected, and the patient and/or guardian has been advised of the potential risks to privacy not withstanding such measures.    You have chosen to receive care through a telehealth visit.  Do you consent to use a video/audio connection for your medical care today? Yes    Subjective   Lovely Mixon is a 57 y.o. female who presents today for follow up    Chief Complaint:  Depression, anxiety, history of sleeping difficulties, and history of PTSD follow-up    Accompanied by: The patient is interviewed alone at today's encounter    History of Present Illness:   The patient describes her mood as worse over the last few weeks.  The patient states she has had a lot of stress with her adopted daughter, and reports she has told her adopted daughter, due to reported uncontrolled behaviors, the daughter has three options including staying with the patient and following the rules, being signed over to be under the custody of a biological family member of the daughter, or going back into foster care.  The patient reports medically she can not deal with the behaviors of this reported daughter, and worries about the stress this is causing her.  She reports  worrying her physical health will decline even more due this increased stress.  The patient rates her symptoms of depression at a 9/10 on a 0-10 scale, with 10 being the worst.  The patient rates her symptoms of anxiety at a 7/10 on a 0-10 scale, with 10 being the worst.  The patient reports her appetite as fair.  The patient reports her sleep as fluctuating.  The patient denies any new medical problems or changes in medications since last appointment with this facility.  The patient reports compliance with her current medication regimen.  The patient denies any side effects or concerns from her current medication regimen.   The patient would like to adjust her medications at this visit to help with her reported worsened moods.  The patient reports regardless of what medication changes can be made, of which she is requesting medication changes, she requests to be on no mediations that can cause weight gain.  The patient reports she has had passive suicidal thoughts including thoughts of being better off not here.  The patient denies any suicidal or homicidal ideations, plans, or intent at today's encounter.  The patient is able to list protective factors against suicide including her sister, her daughter, and other family members and friends.  The patient denies any auditory hallucinations or visual hallucinations.  The patient does not endorse any significant symptoms consistent with lea or psychosis at today's encounter.  The patient reports she is still attending therapy once weekly which has been helpful.      Primary Care Provider:  Mine Abarca    Prior Psychiatric Medications:  -Temazepam 30 mg by mouth once daily at bedtime - states she has taken this since around 2014 for cerebral palsy and also sleep  -Zoloft - states was taking 100 mg and she was still having anxiety and mood symptoms  -Effexor XR  -Buspirone 10 mg by mouth twice daily  -Lamictal      Last Menstrual Period:  None since uterine  ablation on 1/6/21.  Denies any chance of pregnancy.  The patient was educated that her prescribed medications can have potential risk to a developing fetus. The patient is advised to contact this APRN/this office if she becomes pregnant or plans to become pregnant.  Pt verbalizes understanding and acknowledged agreement with this plan in her own words.          The following portions of the patient's history were reviewed and updated as appropriate: allergies, current medications, past family history, past medical history, past social history, past surgical history and problem list.          Past Medical History:  Past Medical History:   Diagnosis Date   • Anxiety    • Cerebral palsy (HCC)    • Depression    • Lazy eye    • PTSD (post-traumatic stress disorder)    • Seasonal allergies    • Vision decreased        Social History:  Social History     Socioeconomic History   • Marital status:    Tobacco Use   • Smoking status: Every Day     Packs/day: 0.50     Types: Cigarettes   • Smokeless tobacco: Never   Substance and Sexual Activity   • Alcohol use: Not Currently     Comment: States an occasional social drink, but not often   • Drug use: Never   • Sexual activity: Not Currently     Partners: Male       Family History:  Family History   Problem Relation Age of Onset   • Heart attack Mother    • Stroke Father    • Alcohol abuse Father    • Anxiety disorder Sister    • Depression Sister    • Alcohol abuse Sister    • Breast cancer Other        Past Surgical History:  Past Surgical History:   Procedure Laterality Date   • CATARACT EXTRACTION     • ENDOMETRIAL ABLATION     • LEG SURGERY      bilateral lower extremity surgeries due to cerebral palsy complications       Problem List:  There is no problem list on file for this patient.      Allergy:   No Known Allergies     Current Medications:   Current Outpatient Medications   Medication Sig Dispense Refill   • busPIRone (BUSPAR) 30 MG tablet Take 1 tablet by  mouth 2 (Two) Times a Day. 60 tablet 0   • lamoTRIgine (LaMICtal) 150 MG tablet Take 1 tablet by mouth Daily. 30 tablet 0   • temazepam (RESTORIL) 30 MG capsule TAKE ONE CAPSULE BY MOUTH ONCE DAILY AT BEDTIME IF NEEDED     • fexofenadine (ALLEGRA) 180 MG tablet Daily.     • ibuprofen (ADVIL,MOTRIN) 800 MG tablet Take 800 mg by mouth 3 (Three) Times a Day.     • Melatonin 10 MG tablet dispersible Place 10 mg on the tongue every night at bedtime.     • methocarbamol (ROBAXIN) 500 MG tablet Take 500 mg by mouth Every 8 (Eight) Hours.     • norethindrone (AYGESTIN) 5 MG tablet Take 5 mg by mouth Daily.     • venlafaxine XR (Effexor XR) 37.5 MG 24 hr capsule Take 2 capsules by mouth Daily for 5 days, THEN 1 capsule Daily for 5 days. Then completely stop taking. 15 capsule 0   • vilazodone (Viibryd) 10 MG tablet tablet Take 1 tablet by mouth Daily. 30 tablet 0     No current facility-administered medications for this visit.       Review of Symptoms:    Review of Systems   Psychiatric/Behavioral: Positive for agitation, decreased concentration, sleep disturbance, depressed mood and stress. Negative for behavioral problems, dysphoric mood, hallucinations, self-injury, suicidal ideas and negative for hyperactivity. The patient is nervous/anxious.          Physical Exam:   There were no vitals taken for this visit. There is no height or weight on file to calculate BMI.   Due to the remote nature of this encounter (virtual encounter), vitals were unable to be obtained.  Height stated at 65 inches.  Weight stated at around 170 pounds.        Physical Exam  Constitutional:       Appearance: She is well-developed.   Neurological:      Mental Status: She is alert and oriented to person, place, and time.   Psychiatric:         Attention and Perception: Attention normal. She is attentive.         Mood and Affect: Mood is anxious and depressed. Affect is tearful.         Speech: Speech normal.         Behavior: Behavior normal.  Behavior is cooperative.         Thought Content: Thought content normal. Thought content is not paranoid or delusional. Thought content does not include homicidal or suicidal ideation. Thought content does not include homicidal or suicidal plan.         Cognition and Memory: Cognition and memory normal.         Judgment: Judgment normal. Judgment is not impulsive or inappropriate.         Mental Status Exam:   Hygiene:   good  Cooperation:  Cooperative  Eye Contact:  Good  Psychomotor Behavior:  Appropriate  Affect:  Tearful  Mood: depressed and anxious  Hopelessness: Denies  Speech:  Normal  Thought Process:  Linear  Thought Content:  Mood congruent  Suicidal:  None  Homicidal:  None  Hallucinations:  None  Delusion:  None  Memory:  Intact  Orientation:  Person, Place, Time and Situation  Reliability:  good  Insight:  Good  Judgement:  Good  Impulse Control:  Good  Physical/Medical Issues:  No            Lab Results:   No visits with results within 1 Month(s) from this visit.   Latest known visit with results is:   No results found for any previous visit.         Assessment & Plan   Problems Addressed this Visit    None  Visit Diagnoses     Major depressive disorder, recurrent episode, moderate (HCC)  (Chronic)   -  Primary    Relevant Medications    temazepam (RESTORIL) 30 MG capsule    busPIRone (BUSPAR) 30 MG tablet    lamoTRIgine (LaMICtal) 150 MG tablet    vilazodone (Viibryd) 10 MG tablet tablet    venlafaxine XR (Effexor XR) 37.5 MG 24 hr capsule    Anxiety disorder, unspecified type  (Chronic)       Relevant Medications    temazepam (RESTORIL) 30 MG capsule    busPIRone (BUSPAR) 30 MG tablet    vilazodone (Viibryd) 10 MG tablet tablet    venlafaxine XR (Effexor XR) 37.5 MG 24 hr capsule    PTSD (post-traumatic stress disorder)  (Chronic)       Relevant Medications    temazepam (RESTORIL) 30 MG capsule    busPIRone (BUSPAR) 30 MG tablet    vilazodone (Viibryd) 10 MG tablet tablet    venlafaxine XR (Effexor  XR) 37.5 MG 24 hr capsule    Sleeping difficulties          Diagnoses       Codes Comments    Major depressive disorder, recurrent episode, moderate (HCC)    -  Primary ICD-10-CM: F33.1  ICD-9-CM: 296.32     Anxiety disorder, unspecified type     ICD-10-CM: F41.9  ICD-9-CM: 300.00     PTSD (post-traumatic stress disorder)     ICD-10-CM: F43.10  ICD-9-CM: 309.81     Sleeping difficulties     ICD-10-CM: G47.9  ICD-9-CM: 780.50           Visit Diagnoses:    ICD-10-CM ICD-9-CM   1. Major depressive disorder, recurrent episode, moderate (HCC)  F33.1 296.32   2. Anxiety disorder, unspecified type  F41.9 300.00   3. PTSD (post-traumatic stress disorder)  F43.10 309.81   4. Sleeping difficulties  G47.9 780.50          GOALS:  Short Term Goals: Patient will be compliant with medication, and patient will have no significant medication related side effects.  Patient will be engaged in psychotherapy as indicated.  Patient will report subjective improvement of symptoms.  Long term goals: To stabilize mood and treat/improve subjective symptoms, the patient will stay out of the hospital, the patient will be at an optimal level of functioning, and the patient will take all medications as prescribed.  The patient verbalized understanding and agreement with goals that were mutually set.      TREATMENT PLAN: Continue supportive psychotherapy efforts and medications as indicated.  Medication and treatment options, both pharmacological and non-pharmacological treatment options, discussed during today's visit, including any off label use of medication. Patient acknowledged and verbally consented with current treatment plan and was educated on the importance of compliance with treatment and follow-up appointments.      -Continue buspirone 30 mg by mouth two times daily for mood.   -Continue Lamictal 150 mg by mouth once daily as an adjunct for mood.  -Taper and discontinue Effexor XR by taking Effexor XR 75 mg by mouth once daily for 5  days, then take Effexor XR 37.5 mg by mouth once daily for 5 days, then completely stop taking Effexor XR.  -Start Viibryd 10 mg by mouth once daily for mood.      MEDICATION ISSUES:  Discussed medication options and treatment plan of prescribed medication, any off label use of medication, as well as the risks, benefits, any black box warnings including increased suicidality, and side effects including but not limited to potential falls, dizziness, possible impaired driving, GI side effects (change in appetite, abdominal discomfort, nausea, vomiting, diarrhea, and/or constipation), dry mouth, somnolence, sedation, insomnia, activation, agitation, irritation, tremors, abnormal muscle movements or disorders, headache, sweating, possible bruising or rare bleeding, electrolyte and/or fluid abnormalities, change in blood pressure/heart rate/and or heart rhythm, sexual dysfunction, and metabolic adversities among others. Patient and/or guardian agreeable to call the office with any worsening of symptoms or onset of side effects, or if any concerns or questions arise.  The contact information for the office is made available to the patient and/or guardian.  Patient and/or guardian agreeable to call 911 or go to the nearest ER should they begin having any SI/HI, or if any urgent concerns arise. No medication side effects or related complaints today.    This APRN has discussed the benefits and risks of taking/continuing Lamictal (Lamotrigine).  The side effects of Lamictal can include a benign rash, blurred or double vision, dizziness, ataxia, sedation, headache, tremor, insomnia, poor coordination, fatigue,  nausea, vomiting, dyspepsia, rhinitis, infection, pharyngitis, asthenia, a rare but serious rash, rare multi-organ failure associated with Pires-Esteban Syndrome, toxic epidermal necrolysis, drug hypersensitivity syndrome, rare blood dyscrasias, rare aseptic meningitis, rare sudden unexplained deaths in people with  epilepsy, withdrawal seizures upon abrupt withdrawal, and rare activation of suicidal ideation and behavior (suicidality).  This APRN has discussed that a very slow dose titration when starting, or changing doses, of Lamictal may reduce the incidence of skin rash and other side effects.  The dosage should not be titrated upwards or increased faster than recommended due to the possibility of the discussed side effects and risk of development of a skin rash (which can become life threatening).    This APRN has also discussed that if the patient stops taking the Lamictal for 3-5 days or longer, it will be necessary to restart the drug with an initial dose titration, as rashes have been reported on reexposure.  If the patient and Provider decide to stop the Lamictal, the patient will follow the directions of this APRN/this office as a guided taper over about two weeks is appropriate due to the risk of relapse in bipolar disorder with those with a mood or bipolar disorder, the risk of seizures in those with epilepsy, and discontinuation symptoms upon rapid discontinuation of Lamictal.    The patient verbalizes understanding of benefits and risks as discussed, the patient/guardian feels the benefits outweigh the risks and is agreeable to continue/take Lamictal as discussed.  The patient is advised should any side effects or rash develops they are to stop the Lamictal immediately and contact this APRN/this office or go to the emergency department immediately.  The patient verbalizes understanding and agreement with treatment plan in their own words.      SUICIDE RISK ASSESSMENT AND SAFETY PLAN: Unalterable demographics and a history of mental health intervention indicate this patient is in a high risk category compared to the general population. At present, the patient denies active SI/HI, intentions, or plans at this time and agrees to seek immediate care should such thoughts develop. The patient verbalizes understanding  of how to access emergency care if needed and agrees to do so. Consideration of suicide risk and protective factors such as history, current presentation, individual strengths and weaknesses, psychosocial and environmental stressors and variables, psychiatric illness and symptoms, medical conditions and pain, took place in this interview. Based on those considerations, the patient is determined: within individual baseline and presenting no imminent risk for suicide or homicide. Other recommendations: The patient does not meet the criteria for inpatient admission and is not a safety risk to self or others at today's visit. Inpatient treatment offers no significant advantages over outpatient treatment for this patient at today's visit.  The patient was given ample time for questions and fully participated in treatment planning.  The patient was encouraged to call the clinic with any questions or concerns.  The patient was informed of access to emergency care. If patient were to develop any significant symptomatology, suicidal ideation, homicidal ideation, any concerns, or feel unsafe at any time they are to call the clinic and if unable to get immediate assistance should immediately call 911 or go to the nearest emergency room.  Patient contracted verbally for the following: If you are experiencing an emotional crisis or have thoughts of harming yourself or others, please go to your nearest local emergency room or call 911. Will continue to re-assess medication response and side effects frequently to establish efficacy and ensure safety. Risks, any black box warnings, side effects, off label usage, and benefits of medication and treatment discussed with patient, along with potential adverse side effects of current and/or newly prescribed medication, alternative treatment options, and OTC medications.  Patient verbalized understanding of potential risks, any off label use of medication, any black box warnings, and any  side effects in their own words. The patient verbalized understanding and agreed to comply with the safety plan discussed in their own words.  Patient given the number to the office. Number also discussed of the 24- hour suicide hotline.           MEDS ORDERED DURING VISIT:  New Medications Ordered This Visit   Medications   • busPIRone (BUSPAR) 30 MG tablet     Sig: Take 1 tablet by mouth 2 (Two) Times a Day.     Dispense:  60 tablet     Refill:  0   • lamoTRIgine (LaMICtal) 150 MG tablet     Sig: Take 1 tablet by mouth Daily.     Dispense:  30 tablet     Refill:  0   • vilazodone (Viibryd) 10 MG tablet tablet     Sig: Take 1 tablet by mouth Daily.     Dispense:  30 tablet     Refill:  0   • venlafaxine XR (Effexor XR) 37.5 MG 24 hr capsule     Sig: Take 2 capsules by mouth Daily for 5 days, THEN 1 capsule Daily for 5 days. Then completely stop taking.     Dispense:  15 capsule     Refill:  0     Return in about 3 weeks (around 11/29/2022), or if symptoms worsen or fail to improve, for Next scheduled follow up and Recheck.         Progress toward goal: Not at goal    Functional Status: Moderate impairment     Prognosis: Fair with Ongoing Treatment      Treatment plan completed: 2/25/21.            This document has been electronically signed by GISELA Vaughn  November 8, 2022 12:37 EST    Some of the data in this electronic note has been brought forward from a previous encounter, any necessary changes have been made, it has been reviewed by this APRN, and it is accurate.    Please note that portions of this note were completed with a voice recognition program.

## 2022-12-01 ENCOUNTER — TELEPHONE (OUTPATIENT)
Dept: PSYCHIATRY | Facility: CLINIC | Age: 57
End: 2022-12-01

## 2022-12-01 NOTE — TELEPHONE ENCOUNTER
Pt thought her appt was at 8:30 this morning and missed her appt.  Pt states she is still having some depression with vilazodone. Pt states she doesn't feel like she is feeling any better. Pt has been rescheduled for 01/04/2023.  Pt has been put on the wait list.    Please Advised

## 2022-12-08 ENCOUNTER — TELEMEDICINE (OUTPATIENT)
Dept: PSYCHIATRY | Facility: CLINIC | Age: 57
End: 2022-12-08

## 2022-12-08 DIAGNOSIS — F41.9 ANXIETY DISORDER, UNSPECIFIED TYPE: Chronic | ICD-10-CM

## 2022-12-08 DIAGNOSIS — F43.10 PTSD (POST-TRAUMATIC STRESS DISORDER): ICD-10-CM

## 2022-12-08 DIAGNOSIS — F33.1 MAJOR DEPRESSIVE DISORDER, RECURRENT EPISODE, MODERATE: Primary | Chronic | ICD-10-CM

## 2022-12-08 PROCEDURE — 99214 OFFICE O/P EST MOD 30 MIN: CPT | Performed by: NURSE PRACTITIONER

## 2022-12-08 RX ORDER — LAMOTRIGINE 150 MG/1
150 TABLET ORAL DAILY
Qty: 30 TABLET | Refills: 0 | Status: SHIPPED | OUTPATIENT
Start: 2022-12-08 | End: 2023-01-10 | Stop reason: SDUPTHER

## 2022-12-08 RX ORDER — BUSPIRONE HYDROCHLORIDE 30 MG/1
30 TABLET ORAL 2 TIMES DAILY
Qty: 60 TABLET | Refills: 0 | Status: SHIPPED | OUTPATIENT
Start: 2022-12-08 | End: 2023-01-10 | Stop reason: SDUPTHER

## 2022-12-08 RX ORDER — VILAZODONE HYDROCHLORIDE 20 MG/1
20 TABLET ORAL DAILY
Qty: 30 TABLET | Refills: 0 | Status: SHIPPED | OUTPATIENT
Start: 2022-12-08 | End: 2023-01-09

## 2022-12-08 NOTE — PROGRESS NOTES
This provider is located at the [facility] (through [facility]), [address], [facility], [address] using a secure [employer] Video Visit through [employer]. Patient is being seen remotely via telehealth at their home address in [place], and stated they are in a secure environment for this session. The patient's condition being diagnosed/treated is appropriate for telemedicine. The provider identified herself as well as her credentials.   The patient, and/or patients guardian, consent to be seen remotely, and when consent is given they understand that the consent allows for patient identifiable information to be sent to a third party as needed.   They may refuse to be seen remotely at any time. The electronic data is encrypted and password protected, and the patient and/or guardian has been advised of the potential risks to privacy not withstanding such measures.  Patient identifiers utilized: Name and date of birth.    You have chosen to receive care through a telehealth visit.  Do you consent to use a video/audio connection for your medical care today? Yes    Subjective   Lovely Mixon is a 57 y.o. female who presents today for follow up    Chief Complaint:  Depression, anxiety, history of sleeping difficulties, and history of PTSD follow-up    Accompanied by: The patient is interviewed alone at today's encounter    History of Present Illness:   The patient describes her mood as depressed over the last few weeks.  The patient states she did not having any trouble tapering off of the Effexor, but reports she feels her Viibryd may need increased.  The patient rates her symptoms of depression at a 8-9/10 on a 0-10 scale, with 10 being the worst.  The patient rates her symptoms of anxiety at a 8-9/10 on a 0-10 scale, with 10 being the worst.  The patient reports her appetite as good.  The patient reports her sleep as fair.  The patient states she still wakes up in the night some,  "but is able to fall back to sleep if she does wake up.  She reports averaging around 7 hours of sleep each night.  She denies any recent nightmares, but reports she does still dream frequently.  The patient reports she has been getting \"little boils\" under her arms, and will be getting evaluated for this soon.  The patient denies any other new medical problems or changes in medications since last appointment with this facility.  The patient reports compliance with her current medication regimen.  The patient denies any side effects or concerns from her current medication regimen.   The patient would like to increase her medication at this visit if possible for better coverage of her reported continued worsened and anxious symptoms.  The patient denies any suicidal or homicidal ideations, plans, or intent at today's encounter and is convincing.  The patient denies any auditory hallucinations or visual hallucinations.  The patient does not endorse any significant symptoms consistent with lea or psychosis at today's encounter.      Primary Care Provider:  Mine Abarca    Prior Psychiatric Medications:  -Temazepam 30 mg by mouth once daily at bedtime - states she has taken this since around 2014 for cerebral palsy and also sleep  -Zoloft - states was taking 100 mg and she was still having anxiety and mood symptoms  -Effexor XR  -Buspirone 10 mg by mouth twice daily  -Lamictal  -Viibyrd      Last Menstrual Period:  None since uterine ablation on 1/6/21.  Denies any chance of pregnancy.  The patient was educated that her prescribed medications can have potential risk to a developing fetus. The patient is advised to contact this APRN/this office if she becomes pregnant or plans to become pregnant.  Pt verbalizes understanding and acknowledged agreement with this plan in her own words.          The following portions of the patient's history were reviewed and updated as appropriate: allergies, current " medications, past family history, past medical history, past social history, past surgical history and problem list.          Past Medical History:  Past Medical History:   Diagnosis Date   • Anxiety    • Cerebral palsy (HCC)    • Depression    • Lazy eye    • PTSD (post-traumatic stress disorder)    • Seasonal allergies    • Vision decreased        Social History:  Social History     Socioeconomic History   • Marital status:    Tobacco Use   • Smoking status: Every Day     Packs/day: 0.50     Types: Cigarettes   • Smokeless tobacco: Never   Substance and Sexual Activity   • Alcohol use: Not Currently     Comment: States an occasional social drink, but not often   • Drug use: Never   • Sexual activity: Not Currently     Partners: Male       Family History:  Family History   Problem Relation Age of Onset   • Heart attack Mother    • Stroke Father    • Alcohol abuse Father    • Anxiety disorder Sister    • Depression Sister    • Alcohol abuse Sister    • Breast cancer Other        Past Surgical History:  Past Surgical History:   Procedure Laterality Date   • CATARACT EXTRACTION     • ENDOMETRIAL ABLATION     • LEG SURGERY      bilateral lower extremity surgeries due to cerebral palsy complications       Problem List:  There is no problem list on file for this patient.      Allergy:   No Known Allergies     Current Medications:   Current Outpatient Medications   Medication Sig Dispense Refill   • busPIRone (BUSPAR) 30 MG tablet Take 1 tablet by mouth 2 (Two) Times a Day. 60 tablet 0   • lamoTRIgine (LaMICtal) 150 MG tablet Take 1 tablet by mouth Daily. 30 tablet 0   • vilazodone (Viibryd) 20 MG tablet tablet Take 1 tablet by mouth Daily. 30 tablet 0   • fexofenadine (ALLEGRA) 180 MG tablet Daily.     • ibuprofen (ADVIL,MOTRIN) 800 MG tablet Take 800 mg by mouth 3 (Three) Times a Day.     • Melatonin 10 MG tablet dispersible Place 10 mg on the tongue every night at bedtime.     • methocarbamol (ROBAXIN) 500 MG  tablet Take 500 mg by mouth Every 8 (Eight) Hours.     • norethindrone (AYGESTIN) 5 MG tablet Take 5 mg by mouth Daily.     • temazepam (RESTORIL) 30 MG capsule TAKE ONE CAPSULE BY MOUTH ONCE DAILY AT BEDTIME IF NEEDED       No current facility-administered medications for this visit.       Review of Symptoms:    Review of Systems   Psychiatric/Behavioral: Positive for decreased concentration, sleep disturbance, depressed mood and stress. Negative for agitation, behavioral problems, dysphoric mood, hallucinations, self-injury, suicidal ideas and negative for hyperactivity. The patient is nervous/anxious.          Physical Exam:   There were no vitals taken for this visit. There is no height or weight on file to calculate BMI.   Due to the remote nature of this encounter (virtual encounter), vitals were unable to be obtained.  Height stated at 65 inches.  Weight stated at around 175 pounds.        Physical Exam  Constitutional:       Appearance: She is well-developed.   Neurological:      Mental Status: She is alert and oriented to person, place, and time.   Psychiatric:         Attention and Perception: Attention normal. She is attentive.         Mood and Affect: Affect normal. Mood is anxious and depressed.         Speech: Speech normal.         Behavior: Behavior normal. Behavior is cooperative.         Thought Content: Thought content normal. Thought content is not paranoid or delusional. Thought content does not include homicidal or suicidal ideation. Thought content does not include homicidal or suicidal plan.         Cognition and Memory: Cognition and memory normal.         Judgment: Judgment normal. Judgment is not impulsive or inappropriate.         Mental Status Exam:   Hygiene:   good  Cooperation:  Cooperative  Eye Contact:  Good  Psychomotor Behavior:  Appropriate  Affect:  Appropriate  Mood: depressed and anxious  Hopelessness: Denies  Speech:  Normal  Thought Process:  Linear  Thought Content:  Mood  congruent  Suicidal:  None  Homicidal:  None  Hallucinations:  None  Delusion:  None  Memory:  Intact  Orientation:  Person, Place, Time and Situation  Reliability:  good  Insight:  Good  Judgement:  Good  Impulse Control:  Good  Physical/Medical Issues:  No            Lab Results:   No visits with results within 1 Month(s) from this visit.   Latest known visit with results is:   No results found for any previous visit.         Assessment & Plan   Problems Addressed this Visit    None  Visit Diagnoses     Major depressive disorder, recurrent episode, moderate (HCC)  (Chronic)   -  Primary    Relevant Medications    vilazodone (Viibryd) 20 MG tablet tablet    lamoTRIgine (LaMICtal) 150 MG tablet    busPIRone (BUSPAR) 30 MG tablet    Anxiety disorder, unspecified type  (Chronic)       Relevant Medications    vilazodone (Viibryd) 20 MG tablet tablet    busPIRone (BUSPAR) 30 MG tablet    PTSD (post-traumatic stress disorder)        Relevant Medications    vilazodone (Viibryd) 20 MG tablet tablet    busPIRone (BUSPAR) 30 MG tablet      Diagnoses       Codes Comments    Major depressive disorder, recurrent episode, moderate (HCC)    -  Primary ICD-10-CM: F33.1  ICD-9-CM: 296.32     Anxiety disorder, unspecified type     ICD-10-CM: F41.9  ICD-9-CM: 300.00     PTSD (post-traumatic stress disorder)     ICD-10-CM: F43.10  ICD-9-CM: 309.81           Visit Diagnoses:    ICD-10-CM ICD-9-CM   1. Major depressive disorder, recurrent episode, moderate (HCC)  F33.1 296.32   2. Anxiety disorder, unspecified type  F41.9 300.00   3. PTSD (post-traumatic stress disorder)  F43.10 309.81          GOALS:  Short Term Goals: Patient will be compliant with medication, and patient will have no significant medication related side effects.  Patient will be engaged in psychotherapy as indicated.  Patient will report subjective improvement of symptoms.  Long term goals: To stabilize mood and treat/improve subjective symptoms, the patient will stay  out of the hospital, the patient will be at an optimal level of functioning, and the patient will take all medications as prescribed.  The patient verbalized understanding and agreement with goals that were mutually set.      TREATMENT PLAN: Continue supportive psychotherapy efforts and medications as indicated.  Medication and treatment options, both pharmacological and non-pharmacological treatment options, discussed during today's visit, including any off label use of medication. Patient acknowledged and verbally consented with current treatment plan and was educated on the importance of compliance with treatment and follow-up appointments.      -Continue buspirone 30 mg by mouth two times daily for mood.   -Continue Lamictal 150 mg by mouth once daily as an adjunct for mood.  -Increase Viibryd to 20 mg by mouth once daily for mood.      MEDICATION ISSUES:  Discussed medication options and treatment plan of prescribed medication, any off label use of medication, as well as the risks, benefits, any black box warnings including increased suicidality, and side effects including but not limited to potential falls, dizziness, possible impaired driving, GI side effects (change in appetite, abdominal discomfort, nausea, vomiting, diarrhea, and/or constipation), dry mouth, somnolence, sedation, insomnia, activation, agitation, irritation, tremors, abnormal muscle movements or disorders, headache, sweating, possible bruising or rare bleeding, electrolyte and/or fluid abnormalities, change in blood pressure/heart rate/and or heart rhythm, sexual dysfunction, and metabolic adversities among others. Patient and/or guardian agreeable to call the office with any worsening of symptoms or onset of side effects, or if any concerns or questions arise.  The contact information for the office is made available to the patient and/or guardian.  Patient and/or guardian agreeable to call 911 or go to the nearest ER should they begin  having any SI/HI, or if any urgent concerns arise. No medication side effects or related complaints today.    This APRN has discussed the benefits and risks of taking/continuing Lamictal (Lamotrigine).  The side effects of Lamictal can include a benign rash, blurred or double vision, dizziness, ataxia, sedation, headache, tremor, insomnia, poor coordination, fatigue,  nausea, vomiting, dyspepsia, rhinitis, infection, pharyngitis, asthenia, a rare but serious rash, rare multi-organ failure associated with Pires-Esteban Syndrome, toxic epidermal necrolysis, drug hypersensitivity syndrome, rare blood dyscrasias, rare aseptic meningitis, rare sudden unexplained deaths in people with epilepsy, withdrawal seizures upon abrupt withdrawal, and rare activation of suicidal ideation and behavior (suicidality).  This APRN has discussed that a very slow dose titration when starting, or changing doses, of Lamictal may reduce the incidence of skin rash and other side effects.  The dosage should not be titrated upwards or increased faster than recommended due to the possibility of the discussed side effects and risk of development of a skin rash (which can become life threatening).    This APRN has also discussed that if the patient stops taking the Lamictal for 3-5 days or longer, it will be necessary to restart the drug with an initial dose titration, as rashes have been reported on reexposure.  If the patient and Provider decide to stop the Lamictal, the patient will follow the directions of this APRN/this office as a guided taper over about two weeks is appropriate due to the risk of relapse in bipolar disorder with those with a mood or bipolar disorder, the risk of seizures in those with epilepsy, and discontinuation symptoms upon rapid discontinuation of Lamictal.    The patient verbalizes understanding of benefits and risks as discussed, the patient/guardian feels the benefits outweigh the risks and is agreeable to  continue/take Lamictal as discussed.  The patient is advised should any side effects or rash develops they are to stop the Lamictal immediately and contact this APRN/this office or go to the emergency department immediately.  The patient verbalizes understanding and agreement with treatment plan in their own words.      SUICIDE RISK ASSESSMENT AND SAFETY PLAN: Unalterable demographics and a history of mental health intervention indicate this patient is in a high risk category compared to the general population. At present, the patient denies active SI/HI, intentions, or plans at this time and agrees to seek immediate care should such thoughts develop. The patient verbalizes understanding of how to access emergency care if needed and agrees to do so. Consideration of suicide risk and protective factors such as history, current presentation, individual strengths and weaknesses, psychosocial and environmental stressors and variables, psychiatric illness and symptoms, medical conditions and pain, took place in this interview. Based on those considerations, the patient is determined: within individual baseline and presenting no imminent risk for suicide or homicide. Other recommendations: The patient does not meet the criteria for inpatient admission and is not a safety risk to self or others at today's visit. Inpatient treatment offers no significant advantages over outpatient treatment for this patient at today's visit.  The patient was given ample time for questions and fully participated in treatment planning.  The patient was encouraged to call the clinic with any questions or concerns.  The patient was informed of access to emergency care. If patient were to develop any significant symptomatology, suicidal ideation, homicidal ideation, any concerns, or feel unsafe at any time they are to call the clinic and if unable to get immediate assistance should immediately call 911 or go to the nearest emergency room.   Patient contracted verbally for the following: If you are experiencing an emotional crisis or have thoughts of harming yourself or others, please go to your nearest local emergency room or call 911. Will continue to re-assess medication response and side effects frequently to establish efficacy and ensure safety. Risks, any black box warnings, side effects, off label usage, and benefits of medication and treatment discussed with patient, along with potential adverse side effects of current and/or newly prescribed medication, alternative treatment options, and OTC medications.  Patient verbalized understanding of potential risks, any off label use of medication, any black box warnings, and any side effects in their own words. The patient verbalized understanding and agreed to comply with the safety plan discussed in their own words.  Patient given the number to the office. Number also discussed of the 24- hour suicide hotline.           MEDS ORDERED DURING VISIT:  New Medications Ordered This Visit   Medications   • vilazodone (Viibryd) 20 MG tablet tablet     Sig: Take 1 tablet by mouth Daily.     Dispense:  30 tablet     Refill:  0   • lamoTRIgine (LaMICtal) 150 MG tablet     Sig: Take 1 tablet by mouth Daily.     Dispense:  30 tablet     Refill:  0   • busPIRone (BUSPAR) 30 MG tablet     Sig: Take 1 tablet by mouth 2 (Two) Times a Day.     Dispense:  60 tablet     Refill:  0     Return in about 4 weeks (around 1/5/2023), or if symptoms worsen or fail to improve, for Next scheduled follow up and Recheck.         Progress toward goal: Not at goal    Functional Status: Moderate impairment     Prognosis: Fair with Ongoing Treatment      Treatment plan completed: 2/25/21.            This document has been electronically signed by GISELA Vaughn  December 8, 2022 10:33 EST    Some of the data in this electronic note has been brought forward from a previous encounter, any necessary changes have been made, it  has been reviewed by this APRN, and it is accurate.    Please note that portions of this note were completed with a voice recognition program.

## 2023-01-09 DIAGNOSIS — F41.9 ANXIETY DISORDER, UNSPECIFIED TYPE: Chronic | ICD-10-CM

## 2023-01-09 DIAGNOSIS — F33.1 MAJOR DEPRESSIVE DISORDER, RECURRENT EPISODE, MODERATE: Chronic | ICD-10-CM

## 2023-01-09 RX ORDER — VILAZODONE HYDROCHLORIDE 20 MG/1
TABLET ORAL
Qty: 30 TABLET | Refills: 0 | Status: SHIPPED | OUTPATIENT
Start: 2023-01-09 | End: 2023-01-10

## 2023-01-10 ENCOUNTER — TELEMEDICINE (OUTPATIENT)
Dept: PSYCHIATRY | Facility: CLINIC | Age: 58
End: 2023-01-10
Payer: COMMERCIAL

## 2023-01-10 DIAGNOSIS — F41.9 ANXIETY DISORDER, UNSPECIFIED TYPE: Chronic | ICD-10-CM

## 2023-01-10 DIAGNOSIS — F43.10 PTSD (POST-TRAUMATIC STRESS DISORDER): ICD-10-CM

## 2023-01-10 DIAGNOSIS — G47.9 SLEEPING DIFFICULTIES: ICD-10-CM

## 2023-01-10 DIAGNOSIS — F33.1 MAJOR DEPRESSIVE DISORDER, RECURRENT EPISODE, MODERATE: Primary | Chronic | ICD-10-CM

## 2023-01-10 PROCEDURE — 99214 OFFICE O/P EST MOD 30 MIN: CPT | Performed by: NURSE PRACTITIONER

## 2023-01-10 RX ORDER — LAMOTRIGINE 150 MG/1
150 TABLET ORAL DAILY
Qty: 30 TABLET | Refills: 0 | Status: SHIPPED | OUTPATIENT
Start: 2023-01-10 | End: 2023-02-08 | Stop reason: SDUPTHER

## 2023-01-10 RX ORDER — VILAZODONE HYDROCHLORIDE 40 MG/1
40 TABLET ORAL DAILY
Qty: 30 TABLET | Refills: 0 | Status: SHIPPED | OUTPATIENT
Start: 2023-01-10 | End: 2023-02-08 | Stop reason: SDUPTHER

## 2023-01-10 RX ORDER — BUSPIRONE HYDROCHLORIDE 30 MG/1
30 TABLET ORAL 2 TIMES DAILY
Qty: 60 TABLET | Refills: 0 | Status: SHIPPED | OUTPATIENT
Start: 2023-01-10 | End: 2023-02-08 | Stop reason: SDUPTHER

## 2023-01-10 NOTE — PROGRESS NOTES
This provider is located at the [facility] (through [facility]), [address], [facility], [address] using a secure [employer] Video Visit through [employer]. Patient is being seen remotely via telehealth at their home address in [place], and stated they are in a secure environment for this session. The patient's condition being diagnosed/treated is appropriate for telemedicine. The provider identified herself as well as her credentials.   The patient, and/or patients guardian, consent to be seen remotely, and when consent is given they understand that the consent allows for patient identifiable information to be sent to a third party as needed.   They may refuse to be seen remotely at any time. The electronic data is encrypted and password protected, and the patient and/or guardian has been advised of the potential risks to privacy not withstanding such measures.    You have chosen to receive care through a telehealth visit.  Do you consent to use a video/audio connection for your medical care today? Yes    Patient identifiers utilized: Name and date of birth.    Patient verbally confirmed consent for today's encounter 1/10/2023.    Subjective   Lovely Mixon is a 57 y.o. female who presents today for follow up    Chief Complaint:  Medication management follow-up - Depression, anxiety, history of sleeping difficulties, and history of PTSD follow-up    Accompanied by: The patient is interviewed alone at today's encounter    History of Present Illness:   The patient describes her mood as still depressed and anxious over the last few weeks.  The patient reports she still has a lot of situational stressors in her life.  She reports there is something in her life she has been dealing with, but reports she is not ready to talk about yet.  The patient rates her symptoms of depression at a 9/10 on a 0-10 scale, with 10 being the worst.  The patient reports a lack of motivation to do  things in the home, and even shower at times.  The patient rates her symptoms of anxiety at a 9/10 on a 0-10 scale, with 10 being the worst.  The patient reports she has still been in therapy, but her schedule was interrupted over [place] break. She reports having an appointment with her therapist tomorrow, and reports they typically meet once weekly.  The patient reports her appetite as good.  The patient reports her sleep as fair.  The patient reports she is \"still getting sleep\", but it does not seem restful and she wakes up several times all throughout the night.  The patient denies any new medical problems or changes in medications since last appointment with this facility.  The patient reports compliance with her current medication regimen.  The patient denies any side effects or concerns from her current medication regimen.   The patient denies any suicidal or homicidal ideations, plans, or intent at today's encounter and is convincing.  The patient denies any auditory hallucinations or visual hallucinations.  The patient does not endorse any significant symptoms consistent with lea or psychosis at today's encounter.  The patient reports she would like to adjust her medications at this visit to help with her continued worsened depressive and anxious symptoms.      Primary Care Provider:  Mine Abarca      Prior Psychiatric Medications:  -Temazepam 30 mg by mouth once daily at bedtime - states she has taken this since around 2014 for cerebral palsy and also sleep  -Zoloft - states was taking 100 mg and she was still having anxiety and mood symptoms  -Effexor XR  -Buspirone 10 mg by mouth twice daily  -Lamictal  -Viibyrd      Last Menstrual Period:  None since uterine ablation on 1/6/21.  Denies any chance of pregnancy.  The patient was educated that her prescribed medications can have potential risk to a developing fetus. The patient is advised to contact this APRN/this office if she becomes  pregnant or plans to become pregnant.  Pt verbalizes understanding and acknowledged agreement with this plan in her own words.          The following portions of the patient's history were reviewed and updated as appropriate: allergies, current medications, past family history, past medical history, past social history, past surgical history and problem list.          Past Medical History:  Past Medical History:   Diagnosis Date   • Anxiety    • Cerebral palsy (HCC)    • Depression    • Lazy eye    • PTSD (post-traumatic stress disorder)    • Seasonal allergies    • Vision decreased        Social History:  Social History     Socioeconomic History   • Marital status:    Tobacco Use   • Smoking status: Every Day     Packs/day: 0.50     Types: Cigarettes   • Smokeless tobacco: Never   Substance and Sexual Activity   • Alcohol use: Not Currently     Comment: States an occasional social drink, but not often   • Drug use: Never   • Sexual activity: Not Currently     Partners: Male       Family History:  Family History   Problem Relation Age of Onset   • Heart attack Mother    • Stroke Father    • Alcohol abuse Father    • Anxiety disorder Sister    • Depression Sister    • Alcohol abuse Sister    • Breast cancer Other        Past Surgical History:  Past Surgical History:   Procedure Laterality Date   • CATARACT EXTRACTION     • ENDOMETRIAL ABLATION     • LEG SURGERY      bilateral lower extremity surgeries due to cerebral palsy complications       Problem List:  There is no problem list on file for this patient.      Allergy:   No Known Allergies     Current Medications:   Current Outpatient Medications   Medication Sig Dispense Refill   • busPIRone (BUSPAR) 30 MG tablet Take 1 tablet by mouth 2 (Two) Times a Day. 60 tablet 0   • lamoTRIgine (LaMICtal) 150 MG tablet Take 1 tablet by mouth Daily. 30 tablet 0   • fexofenadine (ALLEGRA) 180 MG tablet Daily.     • ibuprofen (ADVIL,MOTRIN) 800 MG tablet Take 800 mg by  mouth 3 (Three) Times a Day.     • Melatonin 10 MG tablet dispersible Place 10 mg on the tongue every night at bedtime.     • methocarbamol (ROBAXIN) 500 MG tablet Take 500 mg by mouth Every 8 (Eight) Hours.     • norethindrone (AYGESTIN) 5 MG tablet Take 5 mg by mouth Daily.     • temazepam (RESTORIL) 30 MG capsule TAKE ONE CAPSULE BY MOUTH ONCE DAILY AT BEDTIME IF NEEDED     • vilazodone (Viibryd) 40 MG tablet tablet Take 1 tablet by mouth Daily. 30 tablet 0     No current facility-administered medications for this visit.       Review of Symptoms:    Review of Systems   Endocrine: Polyuria:      Psychiatric/Behavioral: Positive for decreased concentration, sleep disturbance, depressed mood and stress. Negative for agitation, behavioral problems, dysphoric mood, hallucinations, self-injury, suicidal ideas and negative for hyperactivity. The patient is nervous/anxious.          Physical Exam:   There were no vitals taken for this visit. There is no height or weight on file to calculate BMI.   Due to the remote nature of this encounter (virtual encounter), vitals were unable to be obtained.  Height stated at 65 inches.  Weight stated at around 175 pounds.        Physical Exam  Constitutional:       Appearance: She is well-developed.   Neurological:      Mental Status: She is alert and oriented to person, place, and time.   Psychiatric:         Attention and Perception: Attention normal.         Mood and Affect: Mood is anxious and depressed.         Speech: Speech normal.         Behavior: Behavior normal. Behavior is cooperative.         Thought Content: Thought content normal. Thought content is not paranoid or delusional. Thought content does not include homicidal or suicidal ideation. Thought content does not include homicidal or suicidal plan.         Cognition and Memory: Cognition and memory normal.         Judgment: Judgment normal.         Mental Status Exam:   Hygiene:   good  Cooperation:  Cooperative  Eye  Contact:  Good  Psychomotor Behavior:  Appropriate  Affect:  Appropriate but tearful at times  Mood: depressed and anxious  Hopelessness: Denies  Speech:  Normal  Thought Process:  Linear  Thought Content:  Mood congruent  Suicidal:  None  Homicidal:  None  Hallucinations:  None  Delusion:  None  Memory:  Intact  Orientation:  Person, Place, Time and Situation  Reliability:  good  Insight:  Good  Judgement:  Good  Impulse Control:  Good  Physical/Medical Issues:  No            Lab Results:   No visits with results within 1 Month(s) from this visit.   Latest known visit with results is:   No results found for any previous visit.         Assessment & Plan   Problems Addressed this Visit    None  Visit Diagnoses     Major depressive disorder, recurrent episode, moderate (HCC)  (Chronic)   -  Primary    Relevant Medications    busPIRone (BUSPAR) 30 MG tablet    lamoTRIgine (LaMICtal) 150 MG tablet    vilazodone (Viibryd) 40 MG tablet tablet    Anxiety disorder, unspecified type  (Chronic)       Relevant Medications    busPIRone (BUSPAR) 30 MG tablet    vilazodone (Viibryd) 40 MG tablet tablet    PTSD (post-traumatic stress disorder)        Relevant Medications    busPIRone (BUSPAR) 30 MG tablet    vilazodone (Viibryd) 40 MG tablet tablet    Sleeping difficulties          Diagnoses       Codes Comments    Major depressive disorder, recurrent episode, moderate (HCC)    -  Primary ICD-10-CM: F33.1  ICD-9-CM: 296.32     Anxiety disorder, unspecified type     ICD-10-CM: F41.9  ICD-9-CM: 300.00     PTSD (post-traumatic stress disorder)     ICD-10-CM: F43.10  ICD-9-CM: 309.81     Sleeping difficulties     ICD-10-CM: G47.9  ICD-9-CM: 780.50           Visit Diagnoses:    ICD-10-CM ICD-9-CM   1. Major depressive disorder, recurrent episode, moderate (HCC)  F33.1 296.32   2. Anxiety disorder, unspecified type  F41.9 300.00   3. PTSD (post-traumatic stress disorder)  F43.10 309.81   4. Sleeping difficulties  G47.9 780.50           GOALS:  Short Term Goals: Patient will be compliant with medication, and patient will have no significant medication related side effects.  Patient will be engaged in psychotherapy as indicated.  Patient will report subjective improvement of symptoms.  Long term goals: To stabilize mood and treat/improve subjective symptoms, the patient will stay out of the hospital, the patient will be at an optimal level of functioning, and the patient will take all medications as prescribed.  The patient verbalized understanding and agreement with goals that were mutually set.      TREATMENT PLAN: Continue supportive psychotherapy efforts and medications as indicated.  Medication and treatment options, both pharmacological and non-pharmacological treatment options, discussed during today's visit, including any off label use of medication. Patient acknowledged and verbally consented with current treatment plan and was educated on the importance of compliance with treatment and follow-up appointments.      -Continue buspirone 30 mg by mouth two times daily for mood.   -Continue Lamictal 150 mg by mouth once daily as an adjunct for mood.  -Increase Viibryd to 40 mg by mouth once daily for mood.      MEDICATION ISSUES:  Discussed medication options and treatment plan of prescribed medication, any off label use of medication, as well as the risks, benefits, any black box warnings including increased suicidality, and side effects including but not limited to potential falls, dizziness, possible impaired driving, GI side effects (change in appetite, abdominal discomfort, nausea, vomiting, diarrhea, and/or constipation), dry mouth, somnolence, sedation, insomnia, activation, agitation, irritation, tremors, abnormal muscle movements or disorders, headache, sweating, possible bruising or rare bleeding, electrolyte and/or fluid abnormalities, change in blood pressure/heart rate/and or heart rhythm, sexual dysfunction, and metabolic  adversities among others. Patient and/or guardian agreeable to call the office with any worsening of symptoms or onset of side effects, or if any concerns or questions arise.  The contact information for the office is made available to the patient and/or guardian.  Patient and/or guardian agreeable to call 911 or go to the nearest ER should they begin having any SI/HI, or if any urgent concerns arise. No medication side effects or related complaints today.    This APRN has discussed the benefits and risks of taking/continuing Lamictal (Lamotrigine).  The side effects of Lamictal can include a benign rash, blurred or double vision, dizziness, ataxia, sedation, headache, tremor, insomnia, poor coordination, fatigue,  nausea, vomiting, dyspepsia, rhinitis, infection, pharyngitis, asthenia, a rare but serious rash, rare multi-organ failure associated with Pires-Esteban Syndrome, toxic epidermal necrolysis, drug hypersensitivity syndrome, rare blood dyscrasias, rare aseptic meningitis, rare sudden unexplained deaths in people with epilepsy, withdrawal seizures upon abrupt withdrawal, and rare activation of suicidal ideation and behavior (suicidality).  This APRN has discussed that a very slow dose titration when starting, or changing doses, of Lamictal may reduce the incidence of skin rash and other side effects.  The dosage should not be titrated upwards or increased faster than recommended due to the possibility of the discussed side effects and risk of development of a skin rash (which can become life threatening).    This APRN has also discussed that if the patient stops taking the Lamictal for 3-5 days or longer, it will be necessary to restart the drug with an initial dose titration, as rashes have been reported on reexposure.  If the patient and Provider decide to stop the Lamictal, the patient will follow the directions of this APRN/this office as a guided taper over about two weeks is appropriate due to the  risk of relapse in bipolar disorder with those with a mood or bipolar disorder, the risk of seizures in those with epilepsy, and discontinuation symptoms upon rapid discontinuation of Lamictal.    The patient verbalizes understanding of benefits and risks as discussed, the patient/guardian feels the benefits outweigh the risks and is agreeable to continue/take Lamictal as discussed.  The patient is advised should any side effects or rash develops they are to stop the Lamictal immediately and contact this APRN/this office or go to the emergency department immediately.  The patient verbalizes understanding and agreement with treatment plan in their own words.      VERBAL INFORMED CONSENT FOR MEDICATION:  The patient was educated that their proposed/prescribed psychotropic medication(s) has potential risks, side effects, adverse effects, and black box warnings; and these have been discussed with the patient.  The patient has been informed that their treatment and medication dosage is to be individualized, and may even be above or below the recommended range/dosage due to patient individualization and response, but medication is prescribed using a shared decision making approach, and no medication or dosage will be prescribed without the patient's verbal consent.  The reason for the use of the medication including any off label use and alternative modes of treatment other than or in addition to medication has been considered and discussed, the probable consequences of not receiving the proposed treatment have been discussed, and any treatment side effects, black box warnings, and cautions associated with treatment have been discussed with the patient.  The patient is allowed ample time to openly discuss and ask questions regarding the proposed medication(s) and treatment plan and the patient verbalizes understanding the reasons for the use of the medication, its potential risks and benefits, other alternative  treatment(s), and the probable consequences that may occur if the proposed medication is not given.  The patient has been given ample time to ask questions and study the information and find the information to be specific, accurate, and complete.  The patient gives verbal consent for the medication(s) proposed/prescribed, they verbalized understanding that they can refuse and withdraw consent at any time with the assistance of this APRN, and the patient has verbally confirmed that they are aware, and are willing, to take the prescribed medication and follow the treatment plan with the known possible risks, side effect, black box warnings, and any potential medication interactions, and the patient reports they will be worse off without this medication and treatment plan.  The patient is advised to contact this APRN/this office if any questions or concerns arise at any time (at [phone]), or call 911/go to the closest emergency department if needed or outside of office hours.      SUICIDE RISK ASSESSMENT AND SAFETY PLAN: Unalterable demographics and a history of mental health intervention indicate this patient is in a high risk category compared to the general population. At present, the patient denies active SI/HI, intentions, or plans at this time and agrees to seek immediate care should such thoughts develop. The patient verbalizes understanding of how to access emergency care if needed and agrees to do so. Consideration of suicide risk and protective factors such as history, current presentation, individual strengths and weaknesses, psychosocial and environmental stressors and variables, psychiatric illness and symptoms, medical conditions and pain, took place in this interview. Based on those considerations, the patient is determined: within individual baseline and presenting no imminent risk for suicide or homicide. Other recommendations: The patient does not meet the criteria for inpatient admission and is  not a safety risk to self or others at today's visit. Inpatient treatment offers no significant advantages over outpatient treatment for this patient at today's visit.  The patient was given ample time for questions and fully participated in treatment planning.  The patient was encouraged to call the clinic with any questions or concerns.  The patient was informed of access to emergency care. If patient were to develop any significant symptomatology, suicidal ideation, homicidal ideation, any concerns, or feel unsafe at any time they are to call the clinic and if unable to get immediate assistance should immediately call 911 or go to the nearest emergency room.  Patient contracted verbally for the following: If you are experiencing an emotional crisis or have thoughts of harming yourself or others, please go to your nearest local emergency room or call 911. Will continue to re-assess medication response and side effects frequently to establish efficacy and ensure safety. Risks, any black box warnings, side effects, off label usage, and benefits of medication and treatment discussed with patient, along with potential adverse side effects of current and/or newly prescribed medication, alternative treatment options, and OTC medications.  Patient verbalized understanding of potential risks, any off label use of medication, any black box warnings, and any side effects in their own words. The patient verbalized understanding and agreed to comply with the safety plan discussed in their own words.  Patient given the number to the office. Number also discussed of the 24- hour suicide hotline.           MEDS ORDERED DURING VISIT:  New Medications Ordered This Visit   Medications   • busPIRone (BUSPAR) 30 MG tablet     Sig: Take 1 tablet by mouth 2 (Two) Times a Day.     Dispense:  60 tablet     Refill:  0   • lamoTRIgine (LaMICtal) 150 MG tablet     Sig: Take 1 tablet by mouth Daily.     Dispense:  30 tablet     Refill:  0    • vilazodone (Viibryd) 40 MG tablet tablet     Sig: Take 1 tablet by mouth Daily.     Dispense:  30 tablet     Refill:  0     Return in about 4 weeks (around 2/7/2023), or if symptoms worsen or fail to improve, for Next scheduled follow up and Recheck.         Progress toward goal: Not at goal    Functional Status: Moderate impairment     Prognosis: Fair with Ongoing Treatment              This document has been electronically signed by GISELA Vaughn  January 10, 2023 10:32 EST    Some of the data in this electronic note has been brought forward from a previous encounter, any necessary changes have been made, it has been reviewed by this APRN, and it is accurate.    Please note that portions of this note were completed with a voice recognition program.

## 2023-02-08 ENCOUNTER — TELEMEDICINE (OUTPATIENT)
Dept: PSYCHIATRY | Facility: CLINIC | Age: 58
End: 2023-02-08
Payer: COMMERCIAL

## 2023-02-08 DIAGNOSIS — G47.9 SLEEPING DIFFICULTIES: ICD-10-CM

## 2023-02-08 DIAGNOSIS — F33.2 SEVERE EPISODE OF RECURRENT MAJOR DEPRESSIVE DISORDER, WITHOUT PSYCHOTIC FEATURES: Primary | Chronic | ICD-10-CM

## 2023-02-08 DIAGNOSIS — F41.9 ANXIETY DISORDER, UNSPECIFIED TYPE: Chronic | ICD-10-CM

## 2023-02-08 DIAGNOSIS — F43.10 PTSD (POST-TRAUMATIC STRESS DISORDER): Chronic | ICD-10-CM

## 2023-02-08 PROCEDURE — 99214 OFFICE O/P EST MOD 30 MIN: CPT | Performed by: NURSE PRACTITIONER

## 2023-02-08 RX ORDER — BUSPIRONE HYDROCHLORIDE 30 MG/1
30 TABLET ORAL 2 TIMES DAILY
Qty: 60 TABLET | Refills: 0 | Status: SHIPPED | OUTPATIENT
Start: 2023-02-08 | End: 2023-03-14 | Stop reason: SDUPTHER

## 2023-02-08 RX ORDER — VILAZODONE HYDROCHLORIDE 40 MG/1
40 TABLET ORAL DAILY
Qty: 30 TABLET | Refills: 0 | Status: SHIPPED | OUTPATIENT
Start: 2023-02-08 | End: 2023-03-14 | Stop reason: SDUPTHER

## 2023-02-08 RX ORDER — LAMOTRIGINE 100 MG/1
100 TABLET ORAL 2 TIMES DAILY
Qty: 60 TABLET | Refills: 0 | Status: SHIPPED | OUTPATIENT
Start: 2023-02-08 | End: 2023-03-13 | Stop reason: SDUPTHER

## 2023-02-08 NOTE — PATIENT INSTRUCTIONS
Suicidal Feelings: How to Help Yourself  Suicide is when you end your own life. Suicidal ideation includes expressing thoughts about, or a preoccupation with, ending your own life. There are many things you can do to help yourself feel better when struggling with these feelings. Many services and people are available to support you and others who struggle with similar feelings.  If you ever feel like you may hurt yourself or others, or have thoughts about taking your own life, get help right away. To get help:  Go to your nearest emergency department.  Call your local emergency services (361 in the [place]  Call the [facility] and [facility] services helpline (211 in the [place]  Call or text a suicide hotline to speak with a trained counselor. The following suicide hotlines are available in the [place]:  7-760-729-TALK (1-793.525.9624 or 444 in the [place]  6-322-GOEPYHY (1-747.801.6720).  Text 216090. This is the Crisis Text Line in the [place]  1-459.222.4017. This is a hotline for Occitan speakers.  1-477.253.7262. This is a hotline for TTY users.  4-220-1-U-LEXIS (1-622.577.9548). This is a hotline for lesbian, valenzuela, bisexual, transgender, or questioning youth.  For a list of hotlines in [place], visit suicide.org/hotlines/international/omaiyh-yjbbgys-ksggrrry.html  Contact a crisis center or a local suicide prevention center. To find a crisis center or suicide prevention center:  Call your local hospital, clinic, community service organization, mental health center, social service provider, or health department. Ask for help with connecting to a crisis center.  For a list of crisis centers in the [place], visit: suicidepreventionlifeline.org  For a list of crisis centers in [place], visit: suicideprevention.ca  How to help yourself feel better    Promise yourself that you will not do anything bad or extreme when you have suicidal feelings. Remember the times you have felt hopeful.  Many people have  gotten through suicidal thoughts and feelings, and you can too.  If you have had these feelings before, remind yourself that you can get through them again.  Let family, friends, teachers, or counselors know how you are feeling. Do not separate yourself from those who care about you and want to help you.  Talk with someone every day, even if you do not feel like talking to anyone or being with other people.  Face-to-face conversation is best to help them understand your feelings.  Contact a mental health care provider and work with this person regularly.  Make a safety plan that you can follow during a crisis.  Include phone numbers of suicide prevention hotlines, mental health professionals, and trusted friends and family members you can call during an emergency.  Save these numbers on your phone.  If you are thinking of taking a lot of medicine, give your medicine to someone who can give it to you as prescribed.  If you are on antidepressants and are concerned you will overdose, tell your health care provider so that he or she can give you safer medicines.  Try to stick to your routines and follow a schedule every day. Make self-care a priority.  Make a list of realistic goals, and cross them off when you achieve them. Accomplishments can give you a sense of worth.  Wait until you are feeling better before doing things that you find difficult or unpleasant.  Do things that you have always enjoyed to take your mind off your feelings.  Try reading a book, or listening to or playing music.  Spending time outside, in nature, may help you feel better.  Follow these instructions at home:    Visit your primary health care provider every year for a physical and a mental health checkup.  Take over-the-counter and prescription medicines only as told by your health care provider.  Ask your health care provider about the possible side effects of any medicines you are taking.  Ask your health care provider about whether  suicidal ideation is a possible side effect of any of your medicines.  Learn about suicidal ideation and what increases the risk for the development of suicidal thoughts.  Eat a well-balanced diet, and eat regular meals.  Get plenty of rest.  Exercise if you are able. Just 30 minutes of exercise each day can help you feel better.  Keep your living space well lit.  Do not use alcohol or drugs. Remove these substances from your home.  General recommendations  Remove weapons, poisons, knives, and other deadly items from your home.  Work with a mental health care provider as needed.  When you are feeling well, write yourself a letter with tips and support that you can read when you are not feeling well.  Remember that life's difficulties can be sorted out with help. Conditions can be treated, and you can learn behaviors and ways of thinking that will help you.  Work with your health care provider or counselor to learn ways of coping with your thoughts and feelings.  Where to find more information  National Suicide Prevention Lifeline: www.suicidepreventionlifeline.org  Hopeline: www.hopeline.Geniuzz  American Foundation for Suicide Prevention: www.afsp.org  The [place] Project (for lesbian, valenzuela, bisexual, transgender, or questioning youth): www.thetrevorproject.org  [place] of Mental Health: www.nimh.nih.gov/health/topics/suicide-prevention  Suicide Prevention Resources: afsp.org/suicide-prevention-resources  Contact a health care provider if:  You feel as though you are a burden to others.  You feel agitated, angry, vengeful, or have extreme mood swings.  You have withdrawn from family and friends.  You are frequently using drugs or alcohol.  Get help right away if:  You are talking about suicide or wishing to die.  You start making plans for how to commit suicide.  You feel that you have no reason to live.  You start making plans for putting your affairs in order, saying goodbye, or giving your possessions  away.  You feel guilt, shame, or unbearable pain, and it seems like there is no way out.  You are engaging in risky behaviors that could lead to death.  If you have any of these thoughts or symptoms, get help right away:  Go to your nearest emergency department or crisis center.  Call emergency services (911 in the [place]  Call or text a suicide crisis helpline.  Summary  Suicide is when you take your own life. Suicidal feelings are thoughts about ending your own life.  Promise yourself that you will not do anything bad or extreme when you have suicidal feelings.  Let family, friends, teachers, or counselors know how you are feeling.  Get help right away if you start making plans for how to commit suicide.  This information is not intended to replace advice given to you by your health care provider. Make sure you discuss any questions you have with your health care provider.  Document Revised: 07/14/2022 Document Reviewed: 04/28/2022  [employer] Patient Education © 2022 [employer]
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
## 2023-03-13 DIAGNOSIS — F33.2 SEVERE EPISODE OF RECURRENT MAJOR DEPRESSIVE DISORDER, WITHOUT PSYCHOTIC FEATURES: Chronic | ICD-10-CM

## 2023-03-13 RX ORDER — LAMOTRIGINE 100 MG/1
100 TABLET ORAL 2 TIMES DAILY
Qty: 60 TABLET | Refills: 0 | Status: SHIPPED | OUTPATIENT
Start: 2023-03-13 | End: 2023-03-14 | Stop reason: SDUPTHER

## 2023-03-14 ENCOUNTER — TELEMEDICINE (OUTPATIENT)
Dept: PSYCHIATRY | Facility: CLINIC | Age: 58
End: 2023-03-14
Payer: COMMERCIAL

## 2023-03-14 DIAGNOSIS — G47.9 SLEEPING DIFFICULTIES: ICD-10-CM

## 2023-03-14 DIAGNOSIS — F41.9 ANXIETY DISORDER, UNSPECIFIED TYPE: Chronic | ICD-10-CM

## 2023-03-14 DIAGNOSIS — F43.10 PTSD (POST-TRAUMATIC STRESS DISORDER): ICD-10-CM

## 2023-03-14 DIAGNOSIS — F33.2 SEVERE EPISODE OF RECURRENT MAJOR DEPRESSIVE DISORDER, WITHOUT PSYCHOTIC FEATURES: Primary | Chronic | ICD-10-CM

## 2023-03-14 PROCEDURE — 1159F MED LIST DOCD IN RCRD: CPT | Performed by: NURSE PRACTITIONER

## 2023-03-14 PROCEDURE — 1160F RVW MEDS BY RX/DR IN RCRD: CPT | Performed by: NURSE PRACTITIONER

## 2023-03-14 PROCEDURE — 99214 OFFICE O/P EST MOD 30 MIN: CPT | Performed by: NURSE PRACTITIONER

## 2023-03-14 RX ORDER — VILAZODONE HYDROCHLORIDE 40 MG/1
40 TABLET ORAL DAILY
Qty: 30 TABLET | Refills: 0 | Status: SHIPPED | OUTPATIENT
Start: 2023-03-14

## 2023-03-14 RX ORDER — BUSPIRONE HYDROCHLORIDE 30 MG/1
30 TABLET ORAL 2 TIMES DAILY
Qty: 60 TABLET | Refills: 0 | Status: SHIPPED | OUTPATIENT
Start: 2023-03-14

## 2023-03-14 RX ORDER — LAMOTRIGINE 100 MG/1
100 TABLET ORAL 2 TIMES DAILY
Qty: 60 TABLET | Refills: 0 | Status: SHIPPED | OUTPATIENT
Start: 2023-03-14
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
## 2023-04-12 ENCOUNTER — TELEMEDICINE (OUTPATIENT)
Dept: PSYCHIATRY | Facility: CLINIC | Age: 58
End: 2023-04-12
Payer: COMMERCIAL

## 2023-04-12 DIAGNOSIS — G47.9 SLEEPING DIFFICULTIES: ICD-10-CM

## 2023-04-12 DIAGNOSIS — F41.9 ANXIETY DISORDER, UNSPECIFIED TYPE: Chronic | ICD-10-CM

## 2023-04-12 DIAGNOSIS — F43.10 PTSD (POST-TRAUMATIC STRESS DISORDER): ICD-10-CM

## 2023-04-12 DIAGNOSIS — F33.2 SEVERE EPISODE OF RECURRENT MAJOR DEPRESSIVE DISORDER, WITHOUT PSYCHOTIC FEATURES: Primary | Chronic | ICD-10-CM

## 2023-04-12 PROCEDURE — 1160F RVW MEDS BY RX/DR IN RCRD: CPT | Performed by: NURSE PRACTITIONER

## 2023-04-12 PROCEDURE — 1159F MED LIST DOCD IN RCRD: CPT | Performed by: NURSE PRACTITIONER

## 2023-04-12 PROCEDURE — 99214 OFFICE O/P EST MOD 30 MIN: CPT | Performed by: NURSE PRACTITIONER

## 2023-04-12 PROCEDURE — 90833 PSYTX W PT W E/M 30 MIN: CPT | Performed by: NURSE PRACTITIONER

## 2023-04-12 RX ORDER — VILAZODONE HYDROCHLORIDE 40 MG/1
40 TABLET ORAL DAILY
Qty: 30 TABLET | Refills: 0 | Status: SHIPPED | OUTPATIENT
Start: 2023-04-12

## 2023-04-12 RX ORDER — LAMOTRIGINE 100 MG/1
100 TABLET ORAL 2 TIMES DAILY
Qty: 60 TABLET | Refills: 0 | Status: SHIPPED | OUTPATIENT
Start: 2023-04-12

## 2023-04-12 RX ORDER — BUSPIRONE HYDROCHLORIDE 30 MG/1
30 TABLET ORAL 2 TIMES DAILY
Qty: 60 TABLET | Refills: 0 | Status: SHIPPED | OUTPATIENT
Start: 2023-04-12

## 2023-05-10 ENCOUNTER — TELEMEDICINE (OUTPATIENT)
Dept: PSYCHIATRY | Facility: CLINIC | Age: 58
End: 2023-05-10
Payer: COMMERCIAL

## 2023-05-10 DIAGNOSIS — F43.10 PTSD (POST-TRAUMATIC STRESS DISORDER): ICD-10-CM

## 2023-05-10 DIAGNOSIS — F41.9 ANXIETY DISORDER, UNSPECIFIED TYPE: Chronic | ICD-10-CM

## 2023-05-10 DIAGNOSIS — F33.2 SEVERE EPISODE OF RECURRENT MAJOR DEPRESSIVE DISORDER, WITHOUT PSYCHOTIC FEATURES: Primary | Chronic | ICD-10-CM

## 2023-05-10 RX ORDER — FEXOFENADINE HCL 180 MG/1
TABLET ORAL EVERY 24 HOURS
COMMUNITY

## 2023-05-10 RX ORDER — BUSPIRONE HYDROCHLORIDE 30 MG/1
30 TABLET ORAL 2 TIMES DAILY
Qty: 60 TABLET | Refills: 0 | Status: SHIPPED | OUTPATIENT
Start: 2023-05-10

## 2023-05-10 RX ORDER — VILAZODONE HYDROCHLORIDE 40 MG/1
40 TABLET ORAL DAILY
Qty: 30 TABLET | Refills: 0 | Status: SHIPPED | OUTPATIENT
Start: 2023-05-10

## 2023-05-10 RX ORDER — LAMOTRIGINE 100 MG/1
100 TABLET ORAL 2 TIMES DAILY
Qty: 60 TABLET | Refills: 0 | Status: SHIPPED | OUTPATIENT
Start: 2023-05-10

## 2023-05-10 RX ORDER — METHOCARBAMOL 500 MG/1
TABLET, FILM COATED ORAL EVERY 8 HOURS SCHEDULED
COMMUNITY

## 2023-05-10 RX ORDER — TEMAZEPAM 30 MG/1
CAPSULE ORAL EVERY 24 HOURS
COMMUNITY
Start: 2023-02-15
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
## 2023-05-23 ENCOUNTER — OFFICE VISIT (OUTPATIENT)
Dept: SURGERY | Facility: CLINIC | Age: 58
End: 2023-05-23
Payer: COMMERCIAL

## 2023-05-23 VITALS
DIASTOLIC BLOOD PRESSURE: 76 MMHG | HEIGHT: 65 IN | WEIGHT: 167 LBS | SYSTOLIC BLOOD PRESSURE: 118 MMHG | BODY MASS INDEX: 27.82 KG/M2

## 2023-05-23 DIAGNOSIS — L02.415 CUTANEOUS ABSCESS OF RIGHT LOWER EXTREMITY: Primary | ICD-10-CM

## 2023-05-23 PROCEDURE — 99202 OFFICE O/P NEW SF 15 MIN: CPT | Performed by: SURGERY

## 2023-05-23 PROCEDURE — 1159F MED LIST DOCD IN RCRD: CPT | Performed by: SURGERY

## 2023-05-23 PROCEDURE — 1160F RVW MEDS BY RX/DR IN RCRD: CPT | Performed by: SURGERY

## 2023-05-23 RX ORDER — SULFAMETHOXAZOLE AND TRIMETHOPRIM 800; 160 MG/1; MG/1
1 TABLET ORAL EVERY 12 HOURS SCHEDULED
COMMUNITY
Start: 2023-05-19
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
## 2023-05-30 ENCOUNTER — OFFICE VISIT (OUTPATIENT)
Dept: SURGERY | Facility: CLINIC | Age: 58
End: 2023-05-30

## 2023-05-30 VITALS — HEIGHT: 65 IN | WEIGHT: 167 LBS | BODY MASS INDEX: 27.82 KG/M2

## 2023-05-30 DIAGNOSIS — L02.415 CUTANEOUS ABSCESS OF RIGHT LOWER EXTREMITY: Primary | ICD-10-CM

## 2023-05-30 PROCEDURE — 99024 POSTOP FOLLOW-UP VISIT: CPT | Performed by: SURGERY

## 2023-05-30 PROCEDURE — 1159F MED LIST DOCD IN RCRD: CPT | Performed by: SURGERY

## 2023-05-30 PROCEDURE — 1160F RVW MEDS BY RX/DR IN RCRD: CPT | Performed by: SURGERY
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
## 2023-06-07 ENCOUNTER — TELEMEDICINE (OUTPATIENT)
Dept: PSYCHIATRY | Facility: CLINIC | Age: 58
End: 2023-06-07
Payer: COMMERCIAL

## 2023-06-07 DIAGNOSIS — G47.9 SLEEPING DIFFICULTIES: ICD-10-CM

## 2023-06-07 DIAGNOSIS — F41.9 ANXIETY DISORDER, UNSPECIFIED TYPE: Chronic | ICD-10-CM

## 2023-06-07 DIAGNOSIS — F33.2 SEVERE EPISODE OF RECURRENT MAJOR DEPRESSIVE DISORDER, WITHOUT PSYCHOTIC FEATURES: Primary | Chronic | ICD-10-CM

## 2023-06-07 RX ORDER — BUSPIRONE HYDROCHLORIDE 30 MG/1
30 TABLET ORAL 2 TIMES DAILY
Qty: 60 TABLET | Refills: 0 | Status: SHIPPED | OUTPATIENT
Start: 2023-06-07

## 2023-06-07 RX ORDER — LAMOTRIGINE 100 MG/1
100 TABLET ORAL 2 TIMES DAILY
Qty: 60 TABLET | Refills: 0 | Status: SHIPPED | OUTPATIENT
Start: 2023-06-07

## 2023-06-07 RX ORDER — IBUPROFEN 800 MG/1
1 TABLET ORAL 3 TIMES DAILY
COMMUNITY
Start: 2023-05-26

## 2023-06-07 RX ORDER — VILAZODONE HYDROCHLORIDE 40 MG/1
40 TABLET ORAL DAILY
Qty: 30 TABLET | Refills: 0 | Status: SHIPPED | OUTPATIENT
Start: 2023-06-07
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
## 2023-06-07 NOTE — PATIENT INSTRUCTIONS
Steps to Quit Smoking  Smoking tobacco is the leading cause of preventable death. It can affect almost every organ in the body. Smoking puts you and those around you at risk for developing many serious chronic diseases. Quitting smoking can be difficult, but it is one of the best things that you can do for your health. It is never too late to quit.  How do I get ready to quit?  When you decide to quit smoking, create a plan to help you succeed. Before you quit:  Pick a date to quit. Set a date within the next 2 weeks to give you time to prepare.  Write down the reasons why you are quitting. Keep this list in places where you will see it often.  Tell your family, friends, and co-workers that you are quitting. Support from your loved ones can make quitting easier.  Talk with your health care provider about your options for quitting smoking.  Find out what treatment options are covered by your health insurance.  Identify people, places, things, and activities that make you want to smoke (triggers). Avoid them.  What first steps can I take to quit smoking?  Throw away all cigarettes at home, at work, and in your car.  Throw away smoking accessories, such as ashtrays and lighters.  Clean your car. Make sure to empty the ashtray.  Clean your home, including curtains and carpets.  What strategies can I use to quit smoking?  Talk with your health care provider about combining strategies, such as taking medicines while you are also receiving in-person counseling. Using these two strategies together makes you more likely to succeed in quitting than if you used either strategy on its own.  If you are pregnant or breastfeeding, talk with your health care provider about finding counseling or other support strategies to quit smoking. Do not take medicine to help you quit smoking unless your health care provider tells you to do so.  To quit smoking:  Quit right away  Quit smoking completely, instead of gradually reducing how much  you smoke over a period of time. Research shows that stopping smoking right away is more successful than gradually quitting.  Attend in-person counseling to help you build problem-solving skills. You are more likely to succeed in quitting if you attend counseling sessions regularly. Even short sessions of 10 minutes can be effective.  Take medicine  You may take medicines to help you quit smoking. Some medicines require a prescription and some you can purchase over-the-counter. Medicines may have nicotine in them to replace the nicotine in cigarettes. Medicines may:  Help to stop cravings.  Help to relieve withdrawal symptoms.  Your health care provider may recommend:  Nicotine patches, gum, or lozenges.  Nicotine inhalers or sprays.  Non-nicotine medicine that is taken by mouth.  Find resources  Find resources and support systems that can help you to quit smoking and remain smoke-free after you quit. These resources are most helpful when you use them often. They include:  Online chats with a counselor.  Telephone quitlines.  Printed self-help materials.  Support groups or group counseling.  Text messaging programs.  Mobile phone apps or applications. Use apps that can help you stick to your quit plan by providing reminders, tips, and encouragement. There are many free apps for mobile devices as well as websites. Examples include Quit Guide from the CDC and smokefree.gov  What things can I do to make it easier to quit?    Reach out to your family and friends for support and encouragement. Call telephone quitlines (2-333-QUIT-NOW), reach out to support groups, or work with a counselor for support.  Ask people who smoke to avoid smoking around you.  Avoid places that trigger you to smoke, such as bars, parties, or smoke-break areas at work.  Spend time with people who do not smoke.  Lessen the stress in your life. Stress can be a smoking trigger for some people. To lessen stress, try:  Exercising regularly.  Doing  deep-breathing exercises.  Doing yoga.  Meditating.  Performing a body scan. This involves closing your eyes, scanning your body from head to toe, and noticing which parts of your body are particularly tense. Try to relax the muscles in those areas.  How will I feel when I quit smoking?  Day 1 to 3 weeks  Within the first 24 hours of quitting smoking, you may start to feel withdrawal symptoms. These symptoms are usually most noticeable 2-3 days after quitting, but they usually do not last for more than 2-3 weeks. You may experience these symptoms:  Mood swings.  Restlessness, anxiety, or irritability.  Trouble concentrating.  Dizziness.  Strong cravings for sugary foods and nicotine.  Mild weight gain.  Constipation.  Nausea.  Coughing or a sore throat.  Changes in how the medicines that you take for unrelated issues work in your body.  Depression.  Trouble sleeping (insomnia).  Week 3 and afterward  After the first 2-3 weeks of quitting, you may start to notice more positive results, such as:  Improved sense of smell and taste.  Decreased coughing and sore throat.  Slower heart rate.  Lower blood pressure.  Clearer skin.  The ability to breathe more easily.  Fewer sick days.  Quitting smoking can be very challenging. Do not get discouraged if you are not successful the first time. Some people need to make many attempts to quit before they achieve long-term success. Do your best to stick to your quit plan, and talk with your health care provider if you have any questions or concerns.  Summary  Smoking tobacco is the leading cause of preventable death. Quitting smoking is one of the best things that you can do for your health.  When you decide to quit smoking, create a plan to help you succeed.  Quit smoking right away, not slowly over a period of time.  When you start quitting, seek help from your health care provider, family, or friends.  This information is not intended to replace advice given to you by your  health care provider. Make sure you discuss any questions you have with your health care provider.  Document Revised: 08/26/2022 Document Reviewed: 03/07/2020  [employer] Patient Education © 2022 [employer]

## 2023-08-08 ENCOUNTER — TELEMEDICINE (OUTPATIENT)
Dept: PSYCHIATRY | Facility: CLINIC | Age: 58
End: 2023-08-08
Payer: COMMERCIAL

## 2023-08-08 DIAGNOSIS — F33.2 SEVERE EPISODE OF RECURRENT MAJOR DEPRESSIVE DISORDER, WITHOUT PSYCHOTIC FEATURES: Primary | Chronic | ICD-10-CM

## 2023-08-08 DIAGNOSIS — G47.9 SLEEPING DIFFICULTIES: ICD-10-CM

## 2023-08-08 DIAGNOSIS — F41.9 ANXIETY DISORDER, UNSPECIFIED TYPE: Chronic | ICD-10-CM

## 2023-08-08 RX ORDER — VILAZODONE HYDROCHLORIDE 40 MG/1
40 TABLET ORAL DAILY
Qty: 30 TABLET | Refills: 0 | Status: SHIPPED | OUTPATIENT
Start: 2023-08-08

## 2023-08-08 RX ORDER — BUSPIRONE HYDROCHLORIDE 30 MG/1
30 TABLET ORAL 2 TIMES DAILY
Qty: 60 TABLET | Refills: 0 | Status: SHIPPED | OUTPATIENT
Start: 2023-08-08

## 2023-08-08 RX ORDER — HYDROXYZINE HYDROCHLORIDE 10 MG/1
10 TABLET, FILM COATED ORAL 2 TIMES DAILY PRN
Qty: 60 TABLET | Refills: 0 | Status: SHIPPED | OUTPATIENT
Start: 2023-08-08

## 2023-08-08 RX ORDER — LAMOTRIGINE 100 MG/1
100 TABLET ORAL 2 TIMES DAILY
Qty: 60 TABLET | Refills: 0 | Status: SHIPPED | OUTPATIENT
Start: 2023-08-08
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
## 2023-09-18 ENCOUNTER — TELEMEDICINE (OUTPATIENT)
Dept: PSYCHIATRY | Facility: CLINIC | Age: 58
End: 2023-09-18
Payer: COMMERCIAL

## 2023-09-18 DIAGNOSIS — G47.9 SLEEPING DIFFICULTIES: ICD-10-CM

## 2023-09-18 DIAGNOSIS — F33.2 SEVERE EPISODE OF RECURRENT MAJOR DEPRESSIVE DISORDER, WITHOUT PSYCHOTIC FEATURES: Primary | Chronic | ICD-10-CM

## 2023-09-18 DIAGNOSIS — F41.9 ANXIETY DISORDER, UNSPECIFIED TYPE: Chronic | ICD-10-CM

## 2023-09-18 RX ORDER — LAMOTRIGINE 100 MG/1
100 TABLET ORAL 2 TIMES DAILY
Qty: 60 TABLET | Refills: 0 | Status: SHIPPED | OUTPATIENT
Start: 2023-09-18

## 2023-09-18 RX ORDER — HYDROXYZINE HYDROCHLORIDE 10 MG/1
10 TABLET, FILM COATED ORAL 2 TIMES DAILY PRN
Qty: 60 TABLET | Refills: 0 | Status: SHIPPED | OUTPATIENT
Start: 2023-09-18

## 2023-09-18 RX ORDER — BUSPIRONE HYDROCHLORIDE 30 MG/1
30 TABLET ORAL 2 TIMES DAILY
Qty: 60 TABLET | Refills: 0 | Status: SHIPPED | OUTPATIENT
Start: 2023-09-18

## 2023-09-18 RX ORDER — VILAZODONE HYDROCHLORIDE 40 MG/1
40 TABLET ORAL DAILY
Qty: 30 TABLET | Refills: 0 | Status: SHIPPED | OUTPATIENT
Start: 2023-09-18
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
## 2023-10-16 ENCOUNTER — TELEMEDICINE (OUTPATIENT)
Dept: PSYCHIATRY | Facility: CLINIC | Age: 58
End: 2023-10-16
Payer: COMMERCIAL

## 2023-10-16 DIAGNOSIS — F41.9 ANXIETY DISORDER, UNSPECIFIED TYPE: Chronic | ICD-10-CM

## 2023-10-16 DIAGNOSIS — G47.9 SLEEPING DIFFICULTIES: ICD-10-CM

## 2023-10-16 DIAGNOSIS — F33.2 SEVERE EPISODE OF RECURRENT MAJOR DEPRESSIVE DISORDER, WITHOUT PSYCHOTIC FEATURES: Primary | Chronic | ICD-10-CM

## 2023-10-16 PROCEDURE — 1160F RVW MEDS BY RX/DR IN RCRD: CPT | Performed by: NURSE PRACTITIONER

## 2023-10-16 PROCEDURE — 99214 OFFICE O/P EST MOD 30 MIN: CPT | Performed by: NURSE PRACTITIONER

## 2023-10-16 PROCEDURE — 1159F MED LIST DOCD IN RCRD: CPT | Performed by: NURSE PRACTITIONER

## 2023-10-16 RX ORDER — LAMOTRIGINE 100 MG/1
100 TABLET ORAL 2 TIMES DAILY
Qty: 60 TABLET | Refills: 0 | Status: SHIPPED | OUTPATIENT
Start: 2023-10-16

## 2023-10-16 RX ORDER — BUSPIRONE HYDROCHLORIDE 30 MG/1
30 TABLET ORAL 2 TIMES DAILY
Qty: 60 TABLET | Refills: 0 | Status: SHIPPED | OUTPATIENT
Start: 2023-10-16

## 2023-10-16 RX ORDER — HYDROXYZINE HYDROCHLORIDE 10 MG/1
10 TABLET, FILM COATED ORAL 2 TIMES DAILY PRN
Qty: 60 TABLET | Refills: 0 | Status: SHIPPED | OUTPATIENT
Start: 2023-10-16

## 2023-10-16 RX ORDER — ALBUTEROL SULFATE 90 UG/1
AEROSOL, METERED RESPIRATORY (INHALATION)
COMMUNITY
Start: 2023-08-24

## 2023-10-16 RX ORDER — VILAZODONE HYDROCHLORIDE 40 MG/1
40 TABLET ORAL DAILY
Qty: 30 TABLET | Refills: 0 | Status: SHIPPED | OUTPATIENT
Start: 2023-10-16
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
## 2023-11-09 ENCOUNTER — TELEMEDICINE (OUTPATIENT)
Dept: PSYCHIATRY | Facility: CLINIC | Age: 58
End: 2023-11-09
Payer: COMMERCIAL

## 2023-11-09 DIAGNOSIS — F33.2 SEVERE EPISODE OF RECURRENT MAJOR DEPRESSIVE DISORDER, WITHOUT PSYCHOTIC FEATURES: Primary | Chronic | ICD-10-CM

## 2023-11-09 DIAGNOSIS — F41.9 ANXIETY DISORDER, UNSPECIFIED TYPE: Chronic | ICD-10-CM

## 2023-11-09 DIAGNOSIS — G47.9 SLEEPING DIFFICULTIES: ICD-10-CM

## 2023-11-09 RX ORDER — VILAZODONE HYDROCHLORIDE 40 MG/1
40 TABLET ORAL DAILY
Qty: 30 TABLET | Refills: 0 | Status: SHIPPED | OUTPATIENT
Start: 2023-11-09

## 2023-11-09 RX ORDER — BUSPIRONE HYDROCHLORIDE 30 MG/1
30 TABLET ORAL 2 TIMES DAILY
Qty: 60 TABLET | Refills: 0 | Status: SHIPPED | OUTPATIENT
Start: 2023-11-09

## 2023-11-09 RX ORDER — HYDROXYZINE HYDROCHLORIDE 10 MG/1
10 TABLET, FILM COATED ORAL 3 TIMES DAILY PRN
Qty: 90 TABLET | Refills: 0 | Status: SHIPPED | OUTPATIENT
Start: 2023-11-09

## 2023-11-09 RX ORDER — LAMOTRIGINE 100 MG/1
100 TABLET ORAL 2 TIMES DAILY
Qty: 60 TABLET | Refills: 0 | Status: SHIPPED | OUTPATIENT
Start: 2023-11-09

## 2023-11-09 NOTE — PROGRESS NOTES
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

## 2023-11-10 ENCOUNTER — TELEPHONE (OUTPATIENT)
Dept: PSYCHIATRY | Facility: CLINIC | Age: 58
End: 2023-11-10
Payer: COMMERCIAL

## 2023-11-10 NOTE — TELEPHONE ENCOUNTER
Patient called , said she spoke with provider yesterday and was told if she needed anything for her upcoming court case Monday to call this office. She needs something now (She did not wish to share) Needs to be done today. I advised patient that providers are not in the office on Fridays and it would more than likely be Monday before this message is seen. Patient insist provider said she could call today.     [phone] (Patient)    Please Advise?        Thank You

## 2023-11-15 ENCOUNTER — TELEMEDICINE (OUTPATIENT)
Dept: PSYCHIATRY | Facility: CLINIC | Age: 58
End: 2023-11-15
Payer: COMMERCIAL

## 2023-11-15 DIAGNOSIS — F33.1 MODERATE EPISODE OF RECURRENT MAJOR DEPRESSIVE DISORDER: Primary | Chronic | ICD-10-CM

## 2023-11-15 DIAGNOSIS — F41.9 ANXIETY DISORDER, UNSPECIFIED TYPE: Chronic | ICD-10-CM

## 2023-11-15 DIAGNOSIS — G47.9 SLEEPING DIFFICULTIES: ICD-10-CM

## 2023-11-15 RX ORDER — HYDROXYZINE HYDROCHLORIDE 10 MG/1
10 TABLET, FILM COATED ORAL 3 TIMES DAILY PRN
Qty: 90 TABLET | Refills: 0 | Status: SHIPPED | OUTPATIENT
Start: 2023-11-15

## 2023-11-15 RX ORDER — BUSPIRONE HYDROCHLORIDE 30 MG/1
30 TABLET ORAL 2 TIMES DAILY
Qty: 60 TABLET | Refills: 0 | Status: SHIPPED | OUTPATIENT
Start: 2023-11-15

## 2023-11-15 RX ORDER — LAMOTRIGINE 100 MG/1
100 TABLET ORAL 2 TIMES DAILY
Qty: 60 TABLET | Refills: 0 | Status: SHIPPED | OUTPATIENT
Start: 2023-11-15

## 2023-11-15 RX ORDER — VILAZODONE HYDROCHLORIDE 40 MG/1
40 TABLET ORAL DAILY
Qty: 30 TABLET | Refills: 0 | Status: SHIPPED | OUTPATIENT
Start: 2023-11-15
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
## 2023-12-11 ENCOUNTER — TELEMEDICINE (OUTPATIENT)
Dept: PSYCHIATRY | Facility: CLINIC | Age: 58
End: 2023-12-11
Payer: COMMERCIAL

## 2023-12-11 DIAGNOSIS — F33.1 MODERATE EPISODE OF RECURRENT MAJOR DEPRESSIVE DISORDER: Primary | Chronic | ICD-10-CM

## 2023-12-11 DIAGNOSIS — G47.9 SLEEPING DIFFICULTIES: ICD-10-CM

## 2023-12-11 DIAGNOSIS — F41.9 ANXIETY DISORDER, UNSPECIFIED TYPE: Chronic | ICD-10-CM

## 2023-12-11 PROCEDURE — 1160F RVW MEDS BY RX/DR IN RCRD: CPT | Performed by: NURSE PRACTITIONER

## 2023-12-11 PROCEDURE — 99214 OFFICE O/P EST MOD 30 MIN: CPT | Performed by: NURSE PRACTITIONER

## 2023-12-11 PROCEDURE — 1159F MED LIST DOCD IN RCRD: CPT | Performed by: NURSE PRACTITIONER

## 2023-12-11 RX ORDER — LAMOTRIGINE 100 MG/1
100 TABLET ORAL 2 TIMES DAILY
Qty: 60 TABLET | Refills: 0 | Status: SHIPPED | OUTPATIENT
Start: 2023-12-11

## 2023-12-11 RX ORDER — VILAZODONE HYDROCHLORIDE 40 MG/1
40 TABLET ORAL DAILY
Qty: 30 TABLET | Refills: 0 | Status: SHIPPED | OUTPATIENT
Start: 2023-12-11

## 2023-12-11 RX ORDER — HYDROXYZINE HYDROCHLORIDE 10 MG/1
10 TABLET, FILM COATED ORAL 3 TIMES DAILY PRN
Qty: 90 TABLET | Refills: 0 | Status: SHIPPED | OUTPATIENT
Start: 2023-12-11

## 2023-12-11 RX ORDER — BUSPIRONE HYDROCHLORIDE 30 MG/1
30 TABLET ORAL 2 TIMES DAILY
Qty: 60 TABLET | Refills: 0 | Status: SHIPPED | OUTPATIENT
Start: 2023-12-11

## 2023-12-11 NOTE — TREATMENT PLAN
Multi-Disciplinary Problems (from Behavioral Health Treatment Plan)      Active Problems       Problem: Anxiety  Start Date: 12/11/23      Problem Details: The patient self-scales this problem as a 9 with 10 being the worst.          Goal Priority Start Date Expected End Date End Date    Patient will develop and implement behavioral and cognitive strategies to reduce anxiety and irrational fears. -- 12/11/23 -- --    Goal Details: Progress toward goal:  Not appropriate to rate progress toward goal since this is the initial treatment plan.          Goal Intervention Frequency Start Date End Date    Help patient explore past emotional issues in relation to present anxiety. PRN 12/11/23 --    Intervention Details: Duration of treatment until until remission of symptoms.          Goal Intervention Frequency Start Date End Date    Help patient develop an awareness of their cognitive and physical responses to anxiety. PRN 12/11/23 --    Intervention Details: Duration of treatment until until remission of symptoms.                  Problem: Depression  Start Date: 12/11/23      Problem Details: The patient self-scales this problem as a 9 with 10 being the worst.          Goal Priority Start Date Expected End Date End Date    Patient will demonstrate the ability to initiate new constructive life skills outside of sessions on a consistent basis. -- 12/11/23 -- --    Goal Details: Progress toward goal:  Not appropriate to rate progress toward goal since this is the initial treatment plan.          Goal Intervention Frequency Start Date End Date    Assist patient in setting attainable activities of daily living goals. PRN 12/11/23 --      Goal Intervention Frequency Start Date End Date    Provide education about depression PRN 12/11/23 --    Intervention Details: Duration of treatment until until remission of symptoms.          Goal Intervention Frequency Start Date End Date    Assist patient in developing healthy coping  strategies. PRN 12/11/23 --    Intervention Details: Duration of treatment until until remission of symptoms.
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
## 2024-01-09 ENCOUNTER — TELEMEDICINE (OUTPATIENT)
Dept: PSYCHIATRY | Facility: CLINIC | Age: 59
End: 2024-01-09
Payer: COMMERCIAL

## 2024-01-09 DIAGNOSIS — F41.9 ANXIETY DISORDER, UNSPECIFIED TYPE: Chronic | ICD-10-CM

## 2024-01-09 DIAGNOSIS — F33.1 MODERATE EPISODE OF RECURRENT MAJOR DEPRESSIVE DISORDER: Primary | Chronic | ICD-10-CM

## 2024-01-09 DIAGNOSIS — G47.9 SLEEPING DIFFICULTIES: ICD-10-CM

## 2024-01-09 PROCEDURE — 99214 OFFICE O/P EST MOD 30 MIN: CPT | Performed by: NURSE PRACTITIONER

## 2024-01-09 PROCEDURE — 1159F MED LIST DOCD IN RCRD: CPT | Performed by: NURSE PRACTITIONER

## 2024-01-09 PROCEDURE — 1160F RVW MEDS BY RX/DR IN RCRD: CPT | Performed by: NURSE PRACTITIONER

## 2024-01-09 RX ORDER — LAMOTRIGINE 100 MG/1
100 TABLET ORAL 2 TIMES DAILY
Qty: 60 TABLET | Refills: 0 | Status: SHIPPED | OUTPATIENT
Start: 2024-01-09

## 2024-01-09 RX ORDER — HYDROXYZINE HYDROCHLORIDE 10 MG/1
10 TABLET, FILM COATED ORAL 3 TIMES DAILY PRN
Qty: 90 TABLET | Refills: 0 | Status: SHIPPED | OUTPATIENT
Start: 2024-01-09

## 2024-01-09 RX ORDER — VILAZODONE HYDROCHLORIDE 40 MG/1
40 TABLET ORAL DAILY
Qty: 30 TABLET | Refills: 0 | Status: SHIPPED | OUTPATIENT
Start: 2024-01-09

## 2024-01-09 RX ORDER — BUSPIRONE HYDROCHLORIDE 30 MG/1
30 TABLET ORAL 2 TIMES DAILY
Qty: 60 TABLET | Refills: 0 | Status: SHIPPED | OUTPATIENT
Start: 2024-01-09

## 2024-01-09 NOTE — PROGRESS NOTES
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 1 tablet by mouth 3 (Three) Times a Day As Needed (anxiety and/or sleep).     Dispense:  90 tablet     Refill:  0    busPIRone (BUSPAR) 30 MG tablet     Sig: Take 1 tablet by mouth 2 (Two) Times a Day.     Dispense:  60 tablet     Refill:  0     Return in about 4 weeks (around 2/6/2024), or if symptoms worsen or fail to improve, for Next scheduled follow up and Recheck.         Progress toward goal: Not at goal    Functional Status: Moderate impairment     Prognosis: Fair with Ongoing Treatment              This document has been electronically signed by GISELA Vaughn  January 9, 2024 09:05 EST    Some of the data in this electronic note has been brought forward from a previous encounter, any necessary changes have been made, it has been reviewed by this APRN, and it is accurate.    Please note that portions of this note were completed with a voice recognition program.

## 2024-01-18 ENCOUNTER — TELEPHONE (OUTPATIENT)
Dept: PSYCHIATRY | Facility: CLINIC | Age: 59
End: 2024-01-18
Payer: COMMERCIAL

## 2024-01-18 RX ORDER — TRAZODONE HYDROCHLORIDE 50 MG/1
25-50 TABLET ORAL NIGHTLY PRN
Qty: 30 TABLET | Refills: 0 | Status: SHIPPED | OUTPATIENT
Start: 2024-01-18

## 2024-01-18 NOTE — TELEPHONE ENCOUNTER
This has been sent in as discussed at last encounter with patient.  A prescription for the patient to take trazodone 25 to 50 mg by mouth once nightly as needed for sleeping difficulties has been sent in to her preferred pharmacy.  If the patient has any further questions/concerns please reach out to this office, and as always if any SI/HI please call 911/go to the closest emergency department.

## 2024-02-08 ENCOUNTER — TELEMEDICINE (OUTPATIENT)
Dept: PSYCHIATRY | Facility: CLINIC | Age: 59
End: 2024-02-08
Payer: COMMERCIAL

## 2024-02-08 DIAGNOSIS — F33.1 MODERATE EPISODE OF RECURRENT MAJOR DEPRESSIVE DISORDER: Primary | Chronic | ICD-10-CM

## 2024-02-08 DIAGNOSIS — G47.9 SLEEPING DIFFICULTIES: ICD-10-CM

## 2024-02-08 DIAGNOSIS — F41.9 ANXIETY DISORDER, UNSPECIFIED TYPE: Chronic | ICD-10-CM

## 2024-02-08 RX ORDER — BUSPIRONE HYDROCHLORIDE 30 MG/1
30 TABLET ORAL 2 TIMES DAILY
Qty: 60 TABLET | Refills: 0 | Status: SHIPPED | OUTPATIENT
Start: 2024-02-08

## 2024-02-08 RX ORDER — HYDROXYZINE HYDROCHLORIDE 10 MG/1
10 TABLET, FILM COATED ORAL 3 TIMES DAILY PRN
Qty: 90 TABLET | Refills: 0 | Status: SHIPPED | OUTPATIENT
Start: 2024-02-08

## 2024-02-08 RX ORDER — TRAZODONE HYDROCHLORIDE 50 MG/1
25-50 TABLET ORAL NIGHTLY PRN
Qty: 30 TABLET | Refills: 0 | Status: SHIPPED | OUTPATIENT
Start: 2024-02-08

## 2024-02-08 RX ORDER — LAMOTRIGINE 100 MG/1
100 TABLET ORAL 2 TIMES DAILY
Qty: 60 TABLET | Refills: 0 | Status: SHIPPED | OUTPATIENT
Start: 2024-02-08

## 2024-02-08 RX ORDER — VILAZODONE HYDROCHLORIDE 40 MG/1
40 TABLET ORAL DAILY
Qty: 30 TABLET | Refills: 0 | Status: SHIPPED | OUTPATIENT
Start: 2024-02-08

## 2024-02-08 NOTE — PROGRESS NOTES
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  New Medications Ordered This Visit   Medications    vilazodone (Viibryd) 40 MG tablet tablet     Sig: Take 1 tablet by mouth Daily.     Dispense:  30 tablet     Refill:  0    traZODone (DESYREL) 50 MG tablet     Sig: Take 0.5-1 tablets by mouth At Night As Needed for Sleep.     Dispense:  30 tablet     Refill:  0    lamoTRIgine (LaMICtal) 100 MG tablet     Sig: Take 1 tablet by mouth 2 (Two) Times a Day.     Dispense:  60 tablet     Refill:  0    hydrOXYzine (ATARAX) 10 MG tablet     Sig: Take 1 tablet by mouth 3 (Three) Times a Day As Needed (anxiety and/or sleep).     Dispense:  90 tablet     Refill:  0    busPIRone (BUSPAR) 30 MG tablet     Sig: Take 1 tablet by mouth 2 (Two) Times a Day.     Dispense:  60 tablet     Refill:  0     Return in about 4 weeks (around 3/7/2024), or if symptoms worsen or fail to improve, for Next scheduled follow up and Recheck.         Progress toward goal: Not at goal    Functional Status: Moderate impairment     Prognosis: Fair with Ongoing Treatment              This document has been electronically signed by GISELA Vaughn  February 8, 2024 09:09 EST    Some of the data in this electronic note has been brought forward from a previous encounter, any necessary changes have been made, it has  been reviewed by this APRN, and it is accurate.    Please note that portions of this note were completed with a voice recognition program.

## 2024-03-07 ENCOUNTER — TELEMEDICINE (OUTPATIENT)
Dept: PSYCHIATRY | Facility: CLINIC | Age: 59
End: 2024-03-07
Payer: COMMERCIAL

## 2024-03-07 DIAGNOSIS — G47.9 SLEEPING DIFFICULTIES: ICD-10-CM

## 2024-03-07 DIAGNOSIS — F33.2 SEVERE EPISODE OF RECURRENT MAJOR DEPRESSIVE DISORDER, WITHOUT PSYCHOTIC FEATURES: Primary | Chronic | ICD-10-CM

## 2024-03-07 DIAGNOSIS — F41.9 ANXIETY DISORDER, UNSPECIFIED TYPE: Chronic | ICD-10-CM

## 2024-03-07 RX ORDER — VILAZODONE HYDROCHLORIDE 40 MG/1
40 TABLET ORAL DAILY
Qty: 30 TABLET | Refills: 0 | Status: SHIPPED | OUTPATIENT
Start: 2024-03-07

## 2024-03-07 RX ORDER — LAMOTRIGINE 100 MG/1
100 TABLET ORAL 2 TIMES DAILY
Qty: 60 TABLET | Refills: 0 | Status: SHIPPED | OUTPATIENT
Start: 2024-03-07

## 2024-03-07 RX ORDER — BUSPIRONE HYDROCHLORIDE 30 MG/1
30 TABLET ORAL 2 TIMES DAILY
Qty: 60 TABLET | Refills: 0 | Status: SHIPPED | OUTPATIENT
Start: 2024-03-07

## 2024-03-07 RX ORDER — HYDROXYZINE HYDROCHLORIDE 10 MG/1
10 TABLET, FILM COATED ORAL 3 TIMES DAILY PRN
Qty: 90 TABLET | Refills: 0 | Status: SHIPPED | OUTPATIENT
Start: 2024-03-07

## 2024-03-07 RX ORDER — PREDNISONE 20 MG/1
TABLET ORAL EVERY 24 HOURS
COMMUNITY
Start: 2024-03-04 | End: 2024-03-09

## 2024-03-07 RX ORDER — CEFDINIR 300 MG/1
1 CAPSULE ORAL EVERY 12 HOURS SCHEDULED
COMMUNITY
Start: 2024-03-04 | End: 2024-03-14

## 2024-03-07 RX ORDER — TRAZODONE HYDROCHLORIDE 50 MG/1
25-50 TABLET ORAL NIGHTLY PRN
Qty: 30 TABLET | Refills: 0 | Status: SHIPPED | OUTPATIENT
Start: 2024-03-07

## 2024-03-07 NOTE — PROGRESS NOTES
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 self or others at today's visit. Inpatient treatment offers no significant advantages over outpatient treatment for this patient at today's visit.  The patient was given ample time for questions and fully participated in treatment planning.  The patient was encouraged to call the clinic with any questions or concerns.  The patient was informed of access to emergency care. If patient were to develop any significant symptomatology, suicidal ideation, homicidal ideation, any concerns, or feel unsafe at any time they are to call the clinic and if unable to get immediate assistance should immediately call 911 or go to the nearest emergency room.  Patient contracted verbally for the following: If you are experiencing an emotional crisis or have thoughts of harming yourself or others, please go to your nearest local emergency room or call 911. Will continue to re-assess medication response and side effects frequently to establish efficacy and ensure safety. Risks, any black box warnings, side effects, off label usage, and benefits of medication and treatment discussed with patient, along with potential adverse side effects of current and/or newly prescribed medication, alternative treatment options, and OTC medications.  Patient  verbalized understanding of potential risks, any off label use of medication, any black box warnings, and any side effects in their own words. The patient verbalized understanding and agreed to comply with the safety plan discussed in their own words.  Patient given the number to the office. Number also discussed of the 24- hour suicide hotline.           MEDS ORDERED DURING VISIT:  New Medications Ordered This Visit   Medications    vilazodone (Viibryd) 40 MG tablet tablet     Sig: Take 1 tablet by mouth Daily.     Dispense:  30 tablet     Refill:  0    traZODone (DESYREL) 50 MG tablet     Sig: Take 0.5-1 tablets by mouth At Night As Needed for Sleep.     Dispense:  30 tablet     Refill:  0    lamoTRIgine (LaMICtal) 100 MG tablet     Sig: Take 1 tablet by mouth 2 (Two) Times a Day.     Dispense:  60 tablet     Refill:  0    hydrOXYzine (ATARAX) 10 MG tablet     Sig: Take 1 tablet by mouth 3 (Three) Times a Day As Needed (anxiety and/or sleep).     Dispense:  90 tablet     Refill:  0    busPIRone (BUSPAR) 30 MG tablet     Sig: Take 1 tablet by mouth 2 (Two) Times a Day.     Dispense:  60 tablet     Refill:  0     Return in about 4 weeks (around 4/4/2024), or if symptoms worsen or fail to improve, for Next scheduled follow up and Recheck.         Progress toward goal: Not at goal    Functional Status: Moderate impairment     Prognosis: Fair with Ongoing Treatment              This document has been electronically signed by GISELA Vaughn  March 7, 2024 09:25 EST    Some of the data in this electronic note has been brought forward from a previous encounter, any necessary changes have been made, it has been reviewed by this APRN, and it is accurate.    Please note that portions of this note were completed with a voice recognition program.

## 2024-04-10 ENCOUNTER — TELEPHONE (OUTPATIENT)
Dept: PSYCHIATRY | Facility: CLINIC | Age: 59
End: 2024-04-10
Payer: COMMERCIAL

## 2024-04-10 NOTE — TELEPHONE ENCOUNTER
Pt would like a call back from the provider. Pt states she would like to ask the provider who the provider was that was recommended for the pt son.

## 2024-04-15 NOTE — TELEPHONE ENCOUNTER
Any of the therapists would be appropriate for her son if he has a referral, but I did mention to the patient that the office does have male therapists available if that makes her son more comfortable.

## 2024-04-20 DIAGNOSIS — G47.9 SLEEPING DIFFICULTIES: ICD-10-CM

## 2024-04-21 RX ORDER — TRAZODONE HYDROCHLORIDE 50 MG/1
TABLET ORAL
Qty: 30 TABLET | Refills: 0 | OUTPATIENT
Start: 2024-04-21

## 2024-04-21 RX ORDER — TRAZODONE HYDROCHLORIDE 50 MG/1
TABLET ORAL
Qty: 30 TABLET | Refills: 0 | Status: SHIPPED | OUTPATIENT
Start: 2024-04-21

## 2024-05-01 ENCOUNTER — TELEMEDICINE (OUTPATIENT)
Dept: PSYCHIATRY | Facility: CLINIC | Age: 59
End: 2024-05-01
Payer: COMMERCIAL

## 2024-05-01 DIAGNOSIS — G47.9 SLEEPING DIFFICULTIES: ICD-10-CM

## 2024-05-01 DIAGNOSIS — F41.9 ANXIETY DISORDER, UNSPECIFIED TYPE: Chronic | ICD-10-CM

## 2024-05-01 DIAGNOSIS — F33.1 MODERATE EPISODE OF RECURRENT MAJOR DEPRESSIVE DISORDER: Primary | Chronic | ICD-10-CM

## 2024-05-01 RX ORDER — BUSPIRONE HYDROCHLORIDE 30 MG/1
30 TABLET ORAL 2 TIMES DAILY
Qty: 60 TABLET | Refills: 0 | Status: SHIPPED | OUTPATIENT
Start: 2024-05-01

## 2024-05-01 RX ORDER — VILAZODONE HYDROCHLORIDE 40 MG/1
40 TABLET ORAL DAILY
Qty: 30 TABLET | Refills: 0 | Status: SHIPPED | OUTPATIENT
Start: 2024-05-01

## 2024-05-01 RX ORDER — TRAZODONE HYDROCHLORIDE 50 MG/1
50 TABLET ORAL NIGHTLY PRN
Qty: 30 TABLET | Refills: 0 | Status: SHIPPED | OUTPATIENT
Start: 2024-05-01

## 2024-05-01 RX ORDER — HYDROXYZINE HYDROCHLORIDE 10 MG/1
10 TABLET, FILM COATED ORAL 3 TIMES DAILY PRN
Qty: 90 TABLET | Refills: 0 | Status: SHIPPED | OUTPATIENT
Start: 2024-05-01

## 2024-05-01 RX ORDER — LAMOTRIGINE 100 MG/1
100 TABLET ORAL 2 TIMES DAILY
Qty: 60 TABLET | Refills: 0 | Status: SHIPPED | OUTPATIENT
Start: 2024-05-01

## 2024-05-01 NOTE — PROGRESS NOTES
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     Refill:  0    lamoTRIgine (LaMICtal) 100 MG tablet     Sig: Take 1 tablet by mouth 2 (Two) Times a Day.     Dispense:  60 tablet     Refill:  0    hydrOXYzine (ATARAX) 10 MG tablet     Sig: Take 1 tablet by mouth 3 (Three) Times a Day As Needed (anxiety and/or sleep).     Dispense:  90 tablet     Refill:  0    busPIRone (BUSPAR) 30 MG tablet     Sig: Take 1 tablet by mouth 2 (Two) Times a Day.     Dispense:  60 tablet     Refill:  0    traZODone (DESYREL) 50 MG tablet     Sig: Take 1 tablet by mouth At Night As Needed for Sleep.     Dispense:  30 tablet     Refill:  0     Return in about 4 weeks (around 5/29/2024), or if symptoms worsen or fail to improve, for Next scheduled follow up and Recheck.         Progress toward goal: Not at goal    Functional Status: Moderate impairment     Prognosis: Fair with Ongoing Treatment              This document has been electronically signed by GISELA Vaughn  May 1, 2024 15:19 EDT    Some of the data in this electronic note has been brought forward from a previous encounter, any necessary changes have been made, it has been reviewed by this APRN, and it is accurate.    Please note that portions of this note were completed with a voice recognition program.

## 2024-05-28 ENCOUNTER — TELEMEDICINE (OUTPATIENT)
Dept: PSYCHIATRY | Facility: CLINIC | Age: 59
End: 2024-05-28
Payer: COMMERCIAL

## 2024-05-28 DIAGNOSIS — F33.1 MODERATE EPISODE OF RECURRENT MAJOR DEPRESSIVE DISORDER: Primary | Chronic | ICD-10-CM

## 2024-05-28 DIAGNOSIS — G47.9 SLEEPING DIFFICULTIES: ICD-10-CM

## 2024-05-28 DIAGNOSIS — F41.9 ANXIETY DISORDER, UNSPECIFIED TYPE: Chronic | ICD-10-CM

## 2024-05-28 RX ORDER — TEMAZEPAM 30 MG/1
CAPSULE ORAL EVERY 24 HOURS
COMMUNITY
Start: 2024-05-28

## 2024-05-28 RX ORDER — VILAZODONE HYDROCHLORIDE 40 MG/1
40 TABLET ORAL DAILY
Qty: 30 TABLET | Refills: 0 | Status: SHIPPED | OUTPATIENT
Start: 2024-05-28

## 2024-05-28 RX ORDER — TRAZODONE HYDROCHLORIDE 50 MG/1
50 TABLET ORAL NIGHTLY PRN
Qty: 30 TABLET | Refills: 0 | Status: SHIPPED | OUTPATIENT
Start: 2024-05-28

## 2024-05-28 RX ORDER — HYDROXYZINE HYDROCHLORIDE 10 MG/1
10 TABLET, FILM COATED ORAL 3 TIMES DAILY PRN
Qty: 90 TABLET | Refills: 0 | Status: SHIPPED | OUTPATIENT
Start: 2024-05-28

## 2024-05-28 RX ORDER — BUSPIRONE HYDROCHLORIDE 30 MG/1
30 TABLET ORAL 2 TIMES DAILY
Qty: 60 TABLET | Refills: 0 | Status: SHIPPED | OUTPATIENT
Start: 2024-05-28

## 2024-05-28 RX ORDER — LAMOTRIGINE 100 MG/1
100 TABLET ORAL 2 TIMES DAILY
Qty: 60 TABLET | Refills: 0 | Status: SHIPPED | OUTPATIENT
Start: 2024-05-28

## 2024-05-28 NOTE — PROGRESS NOTES
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 suicide hotline.           MEDS ORDERED DURING VISIT:  New Medications Ordered This Visit   Medications    vilazodone (Viibryd) 40 MG tablet tablet     Sig: Take 1 tablet by mouth Daily.     Dispense:  30 tablet     Refill:  0    traZODone (DESYREL) 50 MG tablet     Sig: Take 1 tablet by mouth At Night As Needed for Sleep.     Dispense:  30 tablet     Refill:  0    lamoTRIgine (LaMICtal) 100 MG tablet     Sig: Take 1 tablet by mouth 2 (Two) Times a Day.     Dispense:  60 tablet     Refill:  0    busPIRone (BUSPAR) 30 MG tablet     Sig: Take 1 tablet by mouth 2 (Two) Times a Day.     Dispense:  60 tablet     Refill:  0    hydrOXYzine (ATARAX) 10 MG tablet     Sig: Take 1 tablet by mouth 3 (Three) Times a Day As Needed (anxiety and/or sleep).     Dispense:  90 tablet     Refill:  0     Return in about 4 weeks (around 6/25/2024), or if symptoms worsen or fail to improve, for Next scheduled follow up and Recheck.         Progress toward goal: Not at goal    Functional Status: Moderate impairment     Prognosis: Fair with Ongoing Treatment              This document has been electronically signed by GISELA Vaughn  May 28, 2024 18:07 EDT    Some of the data in this electronic note has been brought forward from a previous encounter, any necessary changes have been  made, it has been reviewed by this APRN, and it is accurate.    Please note that portions of this note were completed with a voice recognition program.

## 2024-06-25 ENCOUNTER — TELEMEDICINE (OUTPATIENT)
Dept: PSYCHIATRY | Facility: CLINIC | Age: 59
End: 2024-06-25
Payer: COMMERCIAL

## 2024-06-25 DIAGNOSIS — F33.1 MODERATE EPISODE OF RECURRENT MAJOR DEPRESSIVE DISORDER: Primary | Chronic | ICD-10-CM

## 2024-06-25 DIAGNOSIS — G47.9 SLEEPING DIFFICULTIES: ICD-10-CM

## 2024-06-25 DIAGNOSIS — F41.9 ANXIETY DISORDER, UNSPECIFIED TYPE: Chronic | ICD-10-CM

## 2024-06-25 PROCEDURE — 99214 OFFICE O/P EST MOD 30 MIN: CPT | Performed by: NURSE PRACTITIONER

## 2024-06-25 PROCEDURE — 1159F MED LIST DOCD IN RCRD: CPT | Performed by: NURSE PRACTITIONER

## 2024-06-25 PROCEDURE — 1160F RVW MEDS BY RX/DR IN RCRD: CPT | Performed by: NURSE PRACTITIONER

## 2024-06-25 RX ORDER — TRAZODONE HYDROCHLORIDE 50 MG/1
50 TABLET ORAL NIGHTLY PRN
Qty: 30 TABLET | Refills: 0 | Status: SHIPPED | OUTPATIENT
Start: 2024-06-25

## 2024-06-25 RX ORDER — LAMOTRIGINE 100 MG/1
100 TABLET ORAL 2 TIMES DAILY
Qty: 60 TABLET | Refills: 0 | Status: SHIPPED | OUTPATIENT
Start: 2024-06-25

## 2024-06-25 RX ORDER — VILAZODONE HYDROCHLORIDE 40 MG/1
40 TABLET ORAL DAILY
Qty: 30 TABLET | Refills: 0 | Status: SHIPPED | OUTPATIENT
Start: 2024-06-25

## 2024-06-25 RX ORDER — HYDROXYZINE HYDROCHLORIDE 10 MG/1
10 TABLET, FILM COATED ORAL 3 TIMES DAILY PRN
Qty: 90 TABLET | Refills: 0 | Status: SHIPPED | OUTPATIENT
Start: 2024-06-25

## 2024-06-25 RX ORDER — BUSPIRONE HYDROCHLORIDE 30 MG/1
30 TABLET ORAL 2 TIMES DAILY
Qty: 60 TABLET | Refills: 0 | Status: SHIPPED | OUTPATIENT
Start: 2024-06-25

## 2024-06-25 NOTE — PROGRESS NOTES
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 24- hour suicide hotline.           MEDS ORDERED DURING VISIT:  New Medications Ordered This Visit   Medications    vilazodone (Viibryd) 40 MG tablet tablet     Sig: Take 1 tablet by mouth Daily.     Dispense:  30 tablet     Refill:  0    traZODone (DESYREL) 50 MG tablet     Sig: Take 1 tablet by mouth At Night As Needed for Sleep.     Dispense:  30 tablet     Refill:  0    busPIRone (BUSPAR) 30 MG tablet     Sig: Take 1 tablet by mouth 2 (Two) Times a Day.     Dispense:  60 tablet     Refill:  0    hydrOXYzine (ATARAX) 10 MG tablet     Sig: Take 1 tablet by mouth 3 (Three) Times a Day As Needed (anxiety and/or sleep).     Dispense:  90 tablet     Refill:  0    lamoTRIgine (LaMICtal) 100 MG tablet     Sig: Take 1 tablet by mouth 2 (Two) Times a Day.     Dispense:  60 tablet     Refill:  0     Return in about 4 weeks (around 7/23/2024), or if symptoms worsen or fail to improve, for Next scheduled follow up and Recheck.         Progress toward goal: Not at goal    Functional Status: Moderate impairment     Prognosis: Fair with Ongoing Treatment              This document has been electronically signed by GISELA Vaughn  June 25, 2024 10:48 EDT    Some of the data in this electronic note has been brought forward from a  previous encounter, any necessary changes have been made, it has been reviewed by this APRN, and it is accurate.    Please note that portions of this note were completed with a voice recognition program.

## 2024-07-30 ENCOUNTER — TELEMEDICINE (OUTPATIENT)
Dept: PSYCHIATRY | Facility: CLINIC | Age: 59
End: 2024-07-30
Payer: COMMERCIAL

## 2024-07-30 DIAGNOSIS — G47.9 SLEEPING DIFFICULTIES: ICD-10-CM

## 2024-07-30 DIAGNOSIS — F33.1 MODERATE EPISODE OF RECURRENT MAJOR DEPRESSIVE DISORDER: Primary | Chronic | ICD-10-CM

## 2024-07-30 DIAGNOSIS — F41.9 ANXIETY DISORDER, UNSPECIFIED TYPE: Chronic | ICD-10-CM

## 2024-07-30 RX ORDER — VILAZODONE HYDROCHLORIDE 40 MG/1
40 TABLET ORAL DAILY
Qty: 30 TABLET | Refills: 0 | Status: SHIPPED | OUTPATIENT
Start: 2024-07-30

## 2024-07-30 RX ORDER — BUSPIRONE HYDROCHLORIDE 30 MG/1
30 TABLET ORAL 2 TIMES DAILY
Qty: 60 TABLET | Refills: 0 | Status: SHIPPED | OUTPATIENT
Start: 2024-07-30

## 2024-07-30 RX ORDER — LAMOTRIGINE 100 MG/1
100 TABLET ORAL 2 TIMES DAILY
Qty: 60 TABLET | Refills: 0 | Status: SHIPPED | OUTPATIENT
Start: 2024-07-30

## 2024-07-30 RX ORDER — TRAZODONE HYDROCHLORIDE 50 MG/1
50 TABLET ORAL NIGHTLY PRN
Qty: 30 TABLET | Refills: 0 | Status: SHIPPED | OUTPATIENT
Start: 2024-07-30

## 2024-07-30 RX ORDER — HYDROXYZINE HYDROCHLORIDE 10 MG/1
10 TABLET, FILM COATED ORAL 3 TIMES DAILY PRN
Qty: 90 TABLET | Refills: 0 | Status: SHIPPED | OUTPATIENT
Start: 2024-07-30

## 2024-07-30 NOTE — PROGRESS NOTES
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 of the 24- hour suicide hotline.           MEDS ORDERED DURING VISIT:  New Medications Ordered This Visit   Medications    vilazodone (Viibryd) 40 MG tablet tablet     Sig: Take 1 tablet by mouth Daily.     Dispense:  30 tablet     Refill:  0    traZODone (DESYREL) 50 MG tablet     Sig: Take 1 tablet by mouth At Night As Needed for Sleep.     Dispense:  30 tablet     Refill:  0    lamoTRIgine (LaMICtal) 100 MG tablet     Sig: Take 1 tablet by mouth 2 (Two) Times a Day.     Dispense:  60 tablet     Refill:  0    hydrOXYzine (ATARAX) 10 MG tablet     Sig: Take 1 tablet by mouth 3 (Three) Times a Day As Needed (anxiety and/or sleep).     Dispense:  90 tablet     Refill:  0    busPIRone (BUSPAR) 30 MG tablet     Sig: Take 1 tablet by mouth 2 (Two) Times a Day.     Dispense:  60 tablet     Refill:  0     Return in about 4 weeks (around 8/27/2024), or if symptoms worsen or fail to improve, for Next scheduled follow up and Recheck.         Progress toward goal: Not at goal    Functional Status: Moderate impairment     Prognosis: Fair with Ongoing Treatment              This document has been electronically signed by GISELA Vaughn  July 30, 2024 15:19 EDT    Some of the data in this electronic note has been brought forward from a  previous encounter, any necessary changes have been made, it has been reviewed by this APRN, and it is accurate.    Please note that portions of this note were completed with a voice recognition program.

## 2024-08-27 ENCOUNTER — TELEMEDICINE (OUTPATIENT)
Dept: PSYCHIATRY | Facility: CLINIC | Age: 59
End: 2024-08-27
Payer: COMMERCIAL

## 2024-08-27 DIAGNOSIS — G47.9 SLEEPING DIFFICULTIES: ICD-10-CM

## 2024-08-27 DIAGNOSIS — F41.9 ANXIETY DISORDER, UNSPECIFIED TYPE: Chronic | ICD-10-CM

## 2024-08-27 DIAGNOSIS — F33.1 MODERATE EPISODE OF RECURRENT MAJOR DEPRESSIVE DISORDER: Primary | Chronic | ICD-10-CM

## 2024-08-27 RX ORDER — TEMAZEPAM 30 MG
CAPSULE ORAL EVERY 24 HOURS
COMMUNITY
Start: 2024-08-20

## 2024-08-27 RX ORDER — TRAZODONE HYDROCHLORIDE 50 MG/1
50 TABLET, FILM COATED ORAL NIGHTLY PRN
Qty: 30 TABLET | Refills: 0 | Status: SHIPPED | OUTPATIENT
Start: 2024-08-27

## 2024-08-27 RX ORDER — DILTIAZEM HYDROCHLORIDE 60 MG/1
TABLET, FILM COATED ORAL EVERY 12 HOURS SCHEDULED
COMMUNITY
Start: 2024-08-20 | End: 2024-12-17

## 2024-08-27 RX ORDER — BUSPIRONE HYDROCHLORIDE 30 MG/1
30 TABLET ORAL 2 TIMES DAILY
Qty: 60 TABLET | Refills: 0 | Status: SHIPPED | OUTPATIENT
Start: 2024-08-27

## 2024-08-27 RX ORDER — HYDROXYZINE HYDROCHLORIDE 10 MG/1
10 TABLET, FILM COATED ORAL 3 TIMES DAILY PRN
Qty: 90 TABLET | Refills: 0 | Status: SHIPPED | OUTPATIENT
Start: 2024-08-27

## 2024-08-27 RX ORDER — LAMOTRIGINE 100 MG/1
100 TABLET ORAL 2 TIMES DAILY
Qty: 60 TABLET | Refills: 0 | Status: SHIPPED | OUTPATIENT
Start: 2024-08-27

## 2024-08-27 RX ORDER — VILAZODONE HYDROCHLORIDE 40 MG/1
40 TABLET ORAL DAILY
Qty: 30 TABLET | Refills: 0 | Status: SHIPPED | OUTPATIENT
Start: 2024-08-27

## 2024-08-27 NOTE — PROGRESS NOTES
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 psychiatric illness and symptoms, medical conditions and pain, took place in this interview. Based on those considerations, the patient is determined: within individual baseline and presenting no imminent risk for suicide or homicide. Other recommendations: The patient does not meet the criteria for inpatient admission and is not a safety risk to self or others at today's visit. Inpatient treatment offers no significant advantages over outpatient treatment for this patient at today's visit.  The patient was given ample time for questions and fully participated in treatment planning.  The patient was encouraged to call the clinic with any questions or concerns.  The patient was informed of access to emergency care. If patient were to develop any significant symptomatology, suicidal ideation, homicidal ideation, any concerns, or feel unsafe at any time they are to call the clinic and if unable to get immediate assistance should immediately call 911 or go to the nearest emergency room.  Patient contracted verbally for the following: If you are experiencing an emotional crisis or have thoughts of harming yourself or others, please go to your nearest local emergency room or call 911. Will continue to re-assess medication response and side effects frequently  to establish efficacy and ensure safety. Risks, any black box warnings, side effects, off label usage, and benefits of medication and treatment discussed with patient, along with potential adverse side effects of current and/or newly prescribed medication, alternative treatment options, and OTC medications.  Patient verbalized understanding of potential risks, any off label use of medication, any black box warnings, and any side effects in their own words. The patient verbalized understanding and agreed to comply with the safety plan discussed in their own words.  Patient given the number to the office. Number also discussed of the 24- hour suicide hotline.           MEDS ORDERED DURING VISIT:  New Medications Ordered This Visit   Medications    vilazodone (Viibryd) 40 MG tablet tablet     Sig: Take 1 tablet by mouth Daily.     Dispense:  30 tablet     Refill:  0    traZODone (DESYREL) 50 MG tablet     Sig: Take 1 tablet by mouth At Night As Needed for Sleep.     Dispense:  30 tablet     Refill:  0    lamoTRIgine (LaMICtal) 100 MG tablet     Sig: Take 1 tablet by mouth 2 (Two) Times a Day.     Dispense:  60 tablet     Refill:  0    hydrOXYzine (ATARAX) 10 MG tablet     Sig: Take 1 tablet by mouth 3 (Three) Times a Day As Needed (anxiety and/or sleep).     Dispense:  90 tablet     Refill:  0    busPIRone (BUSPAR) 30 MG tablet     Sig: Take 1 tablet by mouth 2 (Two) Times a Day.     Dispense:  60 tablet     Refill:  0     Return in about 4 weeks (around 9/24/2024), or if symptoms worsen or fail to improve, for Next scheduled follow up and Recheck.         Progress toward goal: Not at goal    Functional Status: Moderate impairment     Prognosis: Fair with Ongoing Treatment              This document has been electronically signed by GISELA Vaughn  August 27, 2024 10:48 EDT    Some of the data in this electronic note has been brought forward from a previous encounter, any necessary changes have been made,  it has been reviewed by this APRN, and it is accurate.    Please note that portions of this note were completed with a voice recognition program.

## 2024-09-25 ENCOUNTER — TELEMEDICINE (OUTPATIENT)
Dept: PSYCHIATRY | Facility: CLINIC | Age: 59
End: 2024-09-25
Payer: COMMERCIAL

## 2024-09-25 DIAGNOSIS — F33.1 MODERATE EPISODE OF RECURRENT MAJOR DEPRESSIVE DISORDER: Primary | Chronic | ICD-10-CM

## 2024-09-25 DIAGNOSIS — G47.9 SLEEPING DIFFICULTIES: ICD-10-CM

## 2024-09-25 DIAGNOSIS — F41.9 ANXIETY DISORDER, UNSPECIFIED TYPE: Chronic | ICD-10-CM

## 2024-09-25 RX ORDER — TRAZODONE HYDROCHLORIDE 50 MG/1
50 TABLET, FILM COATED ORAL NIGHTLY PRN
Qty: 30 TABLET | Refills: 0 | Status: SHIPPED | OUTPATIENT
Start: 2024-09-25

## 2024-09-25 RX ORDER — KETOCONAZOLE 20 MG/G
CREAM TOPICAL
COMMUNITY
Start: 2024-09-12

## 2024-09-25 RX ORDER — HYDROXYZINE HYDROCHLORIDE 10 MG/1
10 TABLET, FILM COATED ORAL 3 TIMES DAILY PRN
Qty: 90 TABLET | Refills: 0 | Status: SHIPPED | OUTPATIENT
Start: 2024-09-25

## 2024-09-25 RX ORDER — LAMOTRIGINE 100 MG/1
100 TABLET ORAL 2 TIMES DAILY
Qty: 60 TABLET | Refills: 0 | Status: SHIPPED | OUTPATIENT
Start: 2024-09-25

## 2024-09-25 RX ORDER — VILAZODONE HYDROCHLORIDE 40 MG/1
40 TABLET ORAL DAILY
Qty: 30 TABLET | Refills: 0 | Status: SHIPPED | OUTPATIENT
Start: 2024-09-25

## 2024-09-25 RX ORDER — BUSPIRONE HYDROCHLORIDE 30 MG/1
30 TABLET ORAL 2 TIMES DAILY
Qty: 60 TABLET | Refills: 0 | Status: SHIPPED | OUTPATIENT
Start: 2024-09-25

## 2024-10-23 ENCOUNTER — TELEMEDICINE (OUTPATIENT)
Dept: PSYCHIATRY | Facility: CLINIC | Age: 59
End: 2024-10-23
Payer: COMMERCIAL

## 2024-10-23 DIAGNOSIS — F41.9 ANXIETY DISORDER, UNSPECIFIED TYPE: Chronic | ICD-10-CM

## 2024-10-23 DIAGNOSIS — G47.9 SLEEPING DIFFICULTIES: ICD-10-CM

## 2024-10-23 DIAGNOSIS — F33.1 MODERATE EPISODE OF RECURRENT MAJOR DEPRESSIVE DISORDER: Primary | Chronic | ICD-10-CM

## 2024-10-23 RX ORDER — BUSPIRONE HYDROCHLORIDE 30 MG/1
30 TABLET ORAL 2 TIMES DAILY
Qty: 60 TABLET | Refills: 0 | Status: SHIPPED | OUTPATIENT
Start: 2024-10-23

## 2024-10-23 RX ORDER — HYDROXYZINE HYDROCHLORIDE 10 MG/1
10 TABLET, FILM COATED ORAL 3 TIMES DAILY PRN
Qty: 90 TABLET | Refills: 0 | Status: SHIPPED | OUTPATIENT
Start: 2024-10-23

## 2024-10-23 RX ORDER — VILAZODONE HYDROCHLORIDE 40 MG/1
40 TABLET ORAL DAILY
Qty: 30 TABLET | Refills: 0 | Status: SHIPPED | OUTPATIENT
Start: 2024-10-23

## 2024-10-23 RX ORDER — TRAZODONE HYDROCHLORIDE 50 MG/1
50 TABLET, FILM COATED ORAL NIGHTLY PRN
Qty: 30 TABLET | Refills: 0 | Status: SHIPPED | OUTPATIENT
Start: 2024-10-23

## 2024-10-23 RX ORDER — LAMOTRIGINE 100 MG/1
100 TABLET ORAL 2 TIMES DAILY
Qty: 60 TABLET | Refills: 0 | Status: SHIPPED | OUTPATIENT
Start: 2024-10-23

## 2024-10-23 NOTE — PROGRESS NOTES
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 are experiencing an emotional crisis or have thoughts of harming yourself or others, please go to your nearest local emergency room or call 911. Will continue to re-assess medication response and side effects frequently to establish efficacy and ensure safety. Risks, any black box warnings, side effects, off label usage, and benefits of medication and treatment discussed with patient, along with potential adverse side effects of current and/or newly prescribed medication, alternative treatment options, and OTC medications.  Patient verbalized understanding of potential risks, any off label use of medication, any black box warnings, and any side effects in their own words. The patient verbalized understanding and agreed to comply with the safety plan discussed in their own words.  Patient given the number to the office. Number also discussed of the 24- hour suicide hotline.           MEDS ORDERED DURING VISIT:  New Medications Ordered This Visit   Medications    vilazodone (Viibryd) 40 MG tablet tablet     Sig: Take 1 tablet by mouth Daily.     Dispense:  30 tablet     Refill:  0    traZODone (DESYREL) 50 MG tablet     Sig: Take 1 tablet by mouth At Night As Needed for Sleep.     Dispense:  30 tablet     Refill:  0    lamoTRIgine  (LaMICtal) 100 MG tablet     Sig: Take 1 tablet by mouth 2 (Two) Times a Day.     Dispense:  60 tablet     Refill:  0    hydrOXYzine (ATARAX) 10 MG tablet     Sig: Take 1 tablet by mouth 3 (Three) Times a Day As Needed (anxiety and/or sleep).     Dispense:  90 tablet     Refill:  0    busPIRone (BUSPAR) 30 MG tablet     Sig: Take 1 tablet by mouth 2 (Two) Times a Day.     Dispense:  60 tablet     Refill:  0     Return in about 4 weeks (around 11/20/2024), or if symptoms worsen or fail to improve, for Next scheduled follow up and Recheck.         Progress toward goal: Not at goal    Functional Status: Moderate impairment     Prognosis: Fair with Ongoing Treatment              This document has been electronically signed by GISELA Vaughn  October 23, 2024 11:02 EDT    Some of the data in this electronic note has been brought forward from a previous encounter, any necessary changes have been made, it has been reviewed by this APRN, and it is accurate.    Please note that portions of this note were completed with a voice recognition program.

## 2024-10-24 ENCOUNTER — TELEMEDICINE (OUTPATIENT)
Dept: PSYCHIATRY | Facility: CLINIC | Age: 59
End: 2024-10-24
Payer: COMMERCIAL

## 2024-10-24 DIAGNOSIS — G47.9 SLEEPING DIFFICULTIES: ICD-10-CM

## 2024-10-24 DIAGNOSIS — F33.1 MODERATE EPISODE OF RECURRENT MAJOR DEPRESSIVE DISORDER: Primary | Chronic | ICD-10-CM

## 2024-10-24 DIAGNOSIS — Z86.59 HISTORY OF POSTTRAUMATIC STRESS DISORDER (PTSD): ICD-10-CM

## 2024-10-24 DIAGNOSIS — F41.9 ANXIETY DISORDER, UNSPECIFIED TYPE: Chronic | ICD-10-CM

## 2024-10-24 NOTE — PROGRESS NOTES
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 Consideration of suicide risk and protective factors such as history, current presentation, individual strengths and weaknesses, psychosocial and environmental stressors and variables, psychiatric illness and symptoms, medical conditions and pain, took place in this interview. Based on those considerations, the patient is determined: within individual baseline and presenting no imminent risk for suicide or homicide. Other recommendations: The patient does not meet the criteria for inpatient admission and is not a safety risk to self or others at today's visit. Inpatient treatment offers no significant advantages over outpatient treatment for this patient at today's visit.  The patient was given ample time for questions and fully participated in treatment planning.  The patient was encouraged to call the clinic with any questions or concerns.  The patient was informed of access to emergency care. If patient were to develop any significant symptomatology, suicidal ideation, homicidal ideation, any concerns, or feel unsafe at any time they are to call the clinic and if unable to get immediate assistance should immediately call 911 or go to the nearest emergency room.  Patient contracted verbally for the following: If you  are experiencing an emotional crisis or have thoughts of harming yourself or others, please go to your nearest local emergency room or call 911. Will continue to re-assess medication response and side effects frequently to establish efficacy and ensure safety. Risks, any black box warnings, side effects, off label usage, and benefits of medication and treatment discussed with patient, along with potential adverse side effects of current and/or newly prescribed medication, alternative treatment options, and OTC medications.  Patient verbalized understanding of potential risks, any off label use of medication, any black box warnings, and any side effects in their own words. The patient verbalized understanding and agreed to comply with the safety plan discussed in their own words.  Patient given the number to the office. Number also discussed of the 24- hour suicide hotline.           MEDS ORDERED DURING VISIT:  No orders of the defined types were placed in this encounter.    Return in about 4 weeks (around 11/21/2024), or if symptoms worsen or fail to improve, for Next scheduled follow up and Recheck.         Progress toward goal: Not at goal    Functional Status: Moderate impairment     Prognosis: Fair with Ongoing Treatment              This document has been electronically signed by GISELA Vaughn  October 24, 2024 11:40 EDT    Some of the data in this electronic note has been brought forward from a previous encounter, any necessary changes have been made, it has been reviewed by this APRN, and it is accurate.    Please note that portions of this note were completed with a voice recognition program.

## 2024-10-24 NOTE — LETTER
October 24, 2024    Lovely Mixon  [address] [place] [address] [place] [address]        To Whom It May Concern/Lovely Mixon;      Lovely Mixon, date of birth 1965, has been under the care of this psychiatric mental health nurse practitioner since 2/25/2021.  The patient is currently being treated for major depressive disorder, anxiety disorder, history of posttraumatic stress disorder, and sleeping difficulties.  The patient is compliant with treatment and follow-up appointments.  For any questions or concerns please reach out to our office at [phone].          Sincerely,      GISELA Vaughn

## 2024-11-19 ENCOUNTER — TELEMEDICINE (OUTPATIENT)
Dept: PSYCHIATRY | Facility: CLINIC | Age: 59
End: 2024-11-19
Payer: COMMERCIAL

## 2024-11-19 DIAGNOSIS — F33.1 MODERATE EPISODE OF RECURRENT MAJOR DEPRESSIVE DISORDER: Primary | Chronic | ICD-10-CM

## 2024-11-19 DIAGNOSIS — F41.9 ANXIETY DISORDER, UNSPECIFIED TYPE: Chronic | ICD-10-CM

## 2024-11-19 DIAGNOSIS — G47.9 SLEEPING DIFFICULTIES: ICD-10-CM

## 2024-11-19 RX ORDER — TRAZODONE HYDROCHLORIDE 50 MG/1
50 TABLET, FILM COATED ORAL NIGHTLY PRN
Qty: 30 TABLET | Refills: 0 | Status: SHIPPED | OUTPATIENT
Start: 2024-11-19

## 2024-11-19 RX ORDER — VILAZODONE HYDROCHLORIDE 40 MG/1
40 TABLET ORAL DAILY
Qty: 30 TABLET | Refills: 0 | Status: SHIPPED | OUTPATIENT
Start: 2024-11-19

## 2024-11-19 RX ORDER — BUSPIRONE HYDROCHLORIDE 30 MG/1
30 TABLET ORAL 2 TIMES DAILY
Qty: 60 TABLET | Refills: 0 | Status: SHIPPED | OUTPATIENT
Start: 2024-11-19

## 2024-11-19 RX ORDER — TEMAZEPAM 30 MG/1
CAPSULE ORAL EVERY 24 HOURS
COMMUNITY
Start: 2024-11-12

## 2024-11-19 RX ORDER — LAMOTRIGINE 100 MG/1
100 TABLET ORAL 2 TIMES DAILY
Qty: 60 TABLET | Refills: 0 | Status: SHIPPED | OUTPATIENT
Start: 2024-11-19

## 2024-11-19 RX ORDER — HYDROXYZINE HYDROCHLORIDE 10 MG/1
10 TABLET, FILM COATED ORAL 3 TIMES DAILY PRN
Qty: 90 TABLET | Refills: 0 | Status: SHIPPED | OUTPATIENT
Start: 2024-11-19

## 2024-11-19 NOTE — PROGRESS NOTES
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 feel unsafe at any time they are to call the clinic and if unable to get immediate assistance should immediately call 911 or go to the nearest emergency room.  Patient contracted verbally for the following: If you are experiencing an emotional crisis or have thoughts of harming yourself or others, please go to your nearest local emergency room or call 911. Will continue to re-assess medication response and side effects frequently to establish efficacy and ensure safety. Risks, any black box warnings, side effects, off label usage, and benefits of medication and treatment discussed with patient, along with potential adverse side effects of current and/or newly prescribed medication, alternative treatment options, and OTC medications.  Patient verbalized understanding of potential risks, any off label use of medication, any black box warnings, and any side effects in their own words. The patient verbalized understanding and agreed to comply with the safety plan discussed in their own words.  Patient given the number to the office. Number also discussed of the 24- hour suicide hotline.           MEDS ORDERED DURING VISIT:  New Medications Ordered This Visit   Medications    vilazodone (Viibryd) 40 MG tablet tablet     Sig: Take 1  tablet by mouth Daily.     Dispense:  30 tablet     Refill:  0    traZODone (DESYREL) 50 MG tablet     Sig: Take 1 tablet by mouth At Night As Needed for Sleep.     Dispense:  30 tablet     Refill:  0    lamoTRIgine (LaMICtal) 100 MG tablet     Sig: Take 1 tablet by mouth 2 (Two) Times a Day.     Dispense:  60 tablet     Refill:  0    hydrOXYzine (ATARAX) 10 MG tablet     Sig: Take 1 tablet by mouth 3 (Three) Times a Day As Needed (anxiety and/or sleep).     Dispense:  90 tablet     Refill:  0    busPIRone (BUSPAR) 30 MG tablet     Sig: Take 1 tablet by mouth 2 (Two) Times a Day.     Dispense:  60 tablet     Refill:  0     Return in about 4 weeks (around 12/17/2024), or if symptoms worsen or fail to improve, for Next scheduled follow up and Recheck.         Progress toward goal: Not at goal    Functional Status: Moderate impairment     Prognosis: Fair with Ongoing Treatment              This document has been electronically signed by GISELA Vaughn  November 19, 2024 10:48 EST    Some of the data in this electronic note has been brought forward from a previous encounter, any necessary changes have been made, it has been reviewed by this APRN, and it is accurate.    Please note that portions of this note were completed with a voice recognition program.

## 2024-12-18 ENCOUNTER — TELEMEDICINE (OUTPATIENT)
Dept: PSYCHIATRY | Facility: CLINIC | Age: 59
End: 2024-12-18
Payer: COMMERCIAL

## 2024-12-18 DIAGNOSIS — F33.1 MODERATE EPISODE OF RECURRENT MAJOR DEPRESSIVE DISORDER: Primary | Chronic | ICD-10-CM

## 2024-12-18 DIAGNOSIS — G47.9 SLEEPING DIFFICULTIES: ICD-10-CM

## 2024-12-18 DIAGNOSIS — F41.9 ANXIETY DISORDER, UNSPECIFIED TYPE: Chronic | ICD-10-CM

## 2024-12-18 RX ORDER — BUSPIRONE HYDROCHLORIDE 30 MG/1
30 TABLET ORAL 2 TIMES DAILY
Qty: 60 TABLET | Refills: 1 | Status: SHIPPED | OUTPATIENT
Start: 2024-12-18

## 2024-12-18 RX ORDER — TRAZODONE HYDROCHLORIDE 50 MG/1
50 TABLET, FILM COATED ORAL NIGHTLY PRN
Qty: 30 TABLET | Refills: 1 | Status: SHIPPED | OUTPATIENT
Start: 2024-12-18

## 2024-12-18 RX ORDER — LAMOTRIGINE 100 MG/1
100 TABLET ORAL 2 TIMES DAILY
Qty: 60 TABLET | Refills: 1 | Status: SHIPPED | OUTPATIENT
Start: 2024-12-18

## 2024-12-18 RX ORDER — HYDROXYZINE HYDROCHLORIDE 10 MG/1
10 TABLET, FILM COATED ORAL 3 TIMES DAILY PRN
Qty: 90 TABLET | Refills: 1 | Status: SHIPPED | OUTPATIENT
Start: 2024-12-18

## 2024-12-18 RX ORDER — VILAZODONE HYDROCHLORIDE 40 MG/1
40 TABLET ORAL DAILY
Qty: 30 TABLET | Refills: 1 | Status: SHIPPED | OUTPATIENT
Start: 2024-12-18

## 2024-12-18 NOTE — PROGRESS NOTES
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 patient at today's visit.  The patient was given ample time for questions and fully participated in treatment planning.  The patient was encouraged to call the clinic with any questions or concerns.  The patient was informed of access to emergency care. If patient were to develop any significant symptomatology, suicidal ideation, homicidal ideation, any concerns, or feel unsafe at any time they are to call the clinic and if unable to get immediate assistance should immediately call 911 or go to the nearest emergency room.  Patient contracted verbally for the following: If you are experiencing an emotional crisis or have thoughts of harming yourself or others, please go to your nearest local emergency room or call 911. Will continue to re-assess medication response and side effects frequently to establish efficacy and ensure safety. Risks, any black box warnings, side effects, off label usage, and benefits of medication and treatment discussed with patient, along with potential adverse side effects of current and/or newly prescribed medication, alternative treatment options, and OTC medications.  Patient verbalized understanding of potential risks, any off label use of medication, any black box warnings, and any side effects in their own words.  The patient verbalized understanding and agreed to comply with the safety plan discussed in their own words.  Patient given the number to the office. Number also discussed of the 24- hour suicide hotline.           MEDS ORDERED DURING VISIT:  New Medications Ordered This Visit   Medications    vilazodone (Viibryd) 40 MG tablet tablet     Sig: Take 1 tablet by mouth Daily.     Dispense:  30 tablet     Refill:  1    traZODone (DESYREL) 50 MG tablet     Sig: Take 1 tablet by mouth At Night As Needed for Sleep.     Dispense:  30 tablet     Refill:  1    lamoTRIgine (LaMICtal) 100 MG tablet     Sig: Take 1 tablet by mouth 2 (Two) Times a Day.     Dispense:  60 tablet     Refill:  1    hydrOXYzine (ATARAX) 10 MG tablet     Sig: Take 1 tablet by mouth 3 (Three) Times a Day As Needed (anxiety and/or sleep).     Dispense:  90 tablet     Refill:  1    busPIRone (BUSPAR) 30 MG tablet     Sig: Take 1 tablet by mouth 2 (Two) Times a Day.     Dispense:  60 tablet     Refill:  1     Return in about 4 weeks (around 1/15/2025), or if symptoms worsen or fail to improve, for Next scheduled follow up and Recheck.         Progress toward goal: Not at goal    Functional Status: Moderate impairment     Prognosis: Good with Ongoing Treatment              This document has been electronically signed by GISELA Vaughn  December 18, 2024 10:22 EST    Some of the data in this electronic note has been brought forward from a previous encounter, any necessary changes have been made, it has been reviewed by this APRN, and it is accurate.    Please note that portions of this note were completed with a voice recognition program.

## 2025-02-06 ENCOUNTER — TELEMEDICINE (OUTPATIENT)
Dept: PSYCHIATRY | Facility: CLINIC | Age: 60
End: 2025-02-06
Payer: COMMERCIAL

## 2025-02-06 DIAGNOSIS — G47.9 SLEEPING DIFFICULTIES: ICD-10-CM

## 2025-02-06 DIAGNOSIS — F33.1 MODERATE EPISODE OF RECURRENT MAJOR DEPRESSIVE DISORDER: Primary | Chronic | ICD-10-CM

## 2025-02-06 DIAGNOSIS — F41.9 ANXIETY DISORDER, UNSPECIFIED TYPE: Chronic | ICD-10-CM

## 2025-02-06 RX ORDER — LAMOTRIGINE 100 MG/1
100 TABLET ORAL 2 TIMES DAILY
Qty: 60 TABLET | Refills: 0 | Status: SHIPPED | OUTPATIENT
Start: 2025-02-06

## 2025-02-06 RX ORDER — BUDESONIDE AND FORMOTEROL FUMARATE DIHYDRATE 80; 4.5 UG/1; UG/1
AEROSOL RESPIRATORY (INHALATION)
COMMUNITY

## 2025-02-06 RX ORDER — TRAZODONE HYDROCHLORIDE 50 MG/1
50 TABLET, FILM COATED ORAL NIGHTLY PRN
Qty: 30 TABLET | Refills: 0 | Status: SHIPPED | OUTPATIENT
Start: 2025-02-06

## 2025-02-06 RX ORDER — BUSPIRONE HYDROCHLORIDE 30 MG/1
30 TABLET ORAL 2 TIMES DAILY
Qty: 60 TABLET | Refills: 0 | Status: SHIPPED | OUTPATIENT
Start: 2025-02-06

## 2025-02-06 RX ORDER — HYDROXYZINE HYDROCHLORIDE 10 MG/1
10 TABLET, FILM COATED ORAL 3 TIMES DAILY PRN
Qty: 90 TABLET | Refills: 0 | Status: SHIPPED | OUTPATIENT
Start: 2025-02-06

## 2025-02-06 RX ORDER — VILAZODONE HYDROCHLORIDE 40 MG/1
40 TABLET ORAL DAILY
Qty: 30 TABLET | Refills: 0 | Status: SHIPPED | OUTPATIENT
Start: 2025-02-06

## 2025-02-06 NOTE — PROGRESS NOTES
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 individual baseline and presenting no imminent risk for suicide or homicide. Other recommendations: The patient does not meet the criteria for inpatient admission and is not a safety risk to self or others at today's visit. Inpatient treatment offers no significant advantages over outpatient treatment for this patient at today's visit.  The patient was given ample time for questions and fully participated in treatment planning.  The patient was encouraged to call the clinic with any questions or concerns.  The patient was informed of access to emergency care. If patient were to develop any significant symptomatology, suicidal ideation, homicidal ideation, any concerns, or feel unsafe at any time they are to call the clinic and if unable to get immediate assistance should immediately call 911 or go to the nearest emergency room.  Patient contracted verbally for the following: If you are experiencing an emotional crisis or have thoughts of harming yourself or others, please go to your nearest local emergency room or call 911. Will continue to re-assess medication response and side effects frequently to establish efficacy and ensure safety. Risks, any black box warnings, side effects, off label usage, and benefits of  medication and treatment discussed with patient, along with potential adverse side effects of current and/or newly prescribed medication, alternative treatment options, and OTC medications.  Patient verbalized understanding of potential risks, any off label use of medication, any black box warnings, and any side effects in their own words. The patient verbalized understanding and agreed to comply with the safety plan discussed in their own words.  Patient given the number to the office. Number also discussed of the 24- hour suicide hotline.           MEDS ORDERED DURING VISIT:  New Medications Ordered This Visit   Medications    vilazodone (Viibryd) 40 MG tablet tablet     Sig: Take 1 tablet by mouth Daily.     Dispense:  30 tablet     Refill:  0    traZODone (DESYREL) 50 MG tablet     Sig: Take 1 tablet by mouth At Night As Needed for Sleep.     Dispense:  30 tablet     Refill:  0    lamoTRIgine (LaMICtal) 100 MG tablet     Sig: Take 1 tablet by mouth 2 (Two) Times a Day.     Dispense:  60 tablet     Refill:  0    hydrOXYzine (ATARAX) 10 MG tablet     Sig: Take 1 tablet by mouth 3 (Three) Times a Day As Needed (anxiety and/or sleep).     Dispense:  90 tablet     Refill:  0    busPIRone (BUSPAR) 30 MG tablet     Sig: Take 1 tablet by mouth 2 (Two) Times a Day.     Dispense:  60 tablet     Refill:  0     Return in about 4 weeks (around 3/6/2025), or if symptoms worsen or fail to improve, for Next scheduled follow up and Recheck.         Progress toward goal: Not at goal    Functional Status: Moderate impairment     Prognosis: Good with Ongoing Treatment              This document has been electronically signed by GISELA Vaughn  February 6, 2025 09:03 EST    Some of the data in this electronic note has been brought forward from a previous encounter, any necessary changes have been made, it has been reviewed by this APRN, and it is accurate.    Please note that portions of this note were completed with a  voice recognition program.

## 2025-03-06 ENCOUNTER — TELEMEDICINE (OUTPATIENT)
Dept: PSYCHIATRY | Facility: CLINIC | Age: 60
End: 2025-03-06
Payer: COMMERCIAL

## 2025-03-06 DIAGNOSIS — F33.1 MODERATE EPISODE OF RECURRENT MAJOR DEPRESSIVE DISORDER: Primary | Chronic | ICD-10-CM

## 2025-03-06 DIAGNOSIS — F41.9 ANXIETY DISORDER, UNSPECIFIED TYPE: Chronic | ICD-10-CM

## 2025-03-06 DIAGNOSIS — G47.9 SLEEPING DIFFICULTIES: ICD-10-CM

## 2025-03-06 RX ORDER — TRAZODONE HYDROCHLORIDE 50 MG/1
50 TABLET ORAL NIGHTLY PRN
Qty: 30 TABLET | Refills: 0 | Status: SHIPPED | OUTPATIENT
Start: 2025-03-06

## 2025-03-06 RX ORDER — BUSPIRONE HYDROCHLORIDE 30 MG/1
30 TABLET ORAL 2 TIMES DAILY
Qty: 60 TABLET | Refills: 0 | Status: SHIPPED | OUTPATIENT
Start: 2025-03-06

## 2025-03-06 RX ORDER — HYDROXYZINE HYDROCHLORIDE 10 MG/1
10 TABLET, FILM COATED ORAL 3 TIMES DAILY PRN
Qty: 90 TABLET | Refills: 0 | Status: SHIPPED | OUTPATIENT
Start: 2025-03-06

## 2025-03-06 RX ORDER — LAMOTRIGINE 100 MG/1
100 TABLET ORAL 2 TIMES DAILY
Qty: 60 TABLET | Refills: 0 | Status: SHIPPED | OUTPATIENT
Start: 2025-03-06

## 2025-03-06 RX ORDER — VILAZODONE HYDROCHLORIDE 40 MG/1
40 TABLET ORAL DAILY
Qty: 30 TABLET | Refills: 0 | Status: SHIPPED | OUTPATIENT
Start: 2025-03-06

## 2025-03-06 NOTE — PROGRESS NOTES
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 determined: within individual baseline and presenting no imminent risk for suicide or homicide. Other recommendations: The patient does not meet the criteria for inpatient admission and is not a safety risk to self or others at today's visit. Inpatient treatment offers no significant advantages over outpatient treatment for this patient at today's visit.  The patient was given ample time for questions and fully participated in treatment planning.  The patient was encouraged to call the clinic with any questions or concerns.  The patient was informed of access to emergency care. If patient were to develop any significant symptomatology, suicidal ideation, homicidal ideation, any concerns, or feel unsafe at any time they are to call the clinic and if unable to get immediate assistance should immediately call 911 or go to the nearest emergency room.  Patient contracted verbally for the following: If you are experiencing an emotional crisis or have thoughts of harming yourself or others, please go to your nearest local emergency room or call 911. Will continue to re-assess medication response and side effects frequently to establish efficacy and ensure safety. Risks, any black box warnings, side effects,  off label usage, and benefits of medication and treatment discussed with patient, along with potential adverse side effects of current and/or newly prescribed medication, alternative treatment options, and OTC medications.  Patient verbalized understanding of potential risks, any off label use of medication, any black box warnings, and any side effects in their own words. The patient verbalized understanding and agreed to comply with the safety plan discussed in their own words.  Patient given the number to the office. Number also discussed of the 24- hour suicide hotline.           MEDS ORDERED DURING VISIT:  New Medications Ordered This Visit   Medications    vilazodone (Viibryd) 40 MG tablet tablet     Sig: Take 1 tablet by mouth Daily.     Dispense:  30 tablet     Refill:  0    busPIRone (BUSPAR) 30 MG tablet     Sig: Take 1 tablet by mouth 2 (Two) Times a Day.     Dispense:  60 tablet     Refill:  0    traZODone (DESYREL) 50 MG tablet     Sig: Take 1 tablet by mouth At Night As Needed for Sleep.     Dispense:  30 tablet     Refill:  0    lamoTRIgine (LaMICtal) 100 MG tablet     Sig: Take 1 tablet by mouth 2 (Two) Times a Day.     Dispense:  60 tablet     Refill:  0    hydrOXYzine (ATARAX) 10 MG tablet     Sig: Take 1 tablet by mouth 3 (Three) Times a Day As Needed (anxiety and/or sleep).     Dispense:  90 tablet     Refill:  0     Return in about 4 weeks (around 4/3/2025), or if symptoms worsen or fail to improve, for Next scheduled follow up and Recheck.         Progress toward goal: Not at goal    Functional Status: Moderate impairment     Prognosis: Good with Ongoing Treatment              This document has been electronically signed by GISELA Vaughn  March 6, 2025 10:28 EST    Some of the data in this electronic note has been brought forward from a previous encounter, any necessary changes have been made, it has been reviewed by this APRN, and it is accurate.    Please note that portions of  this note were completed with a voice recognition program.

## 2025-04-03 ENCOUNTER — TELEMEDICINE (OUTPATIENT)
Dept: PSYCHIATRY | Facility: CLINIC | Age: 60
End: 2025-04-03
Payer: COMMERCIAL

## 2025-04-03 DIAGNOSIS — F33.1 MODERATE EPISODE OF RECURRENT MAJOR DEPRESSIVE DISORDER: Primary | Chronic | ICD-10-CM

## 2025-04-03 DIAGNOSIS — G47.9 SLEEPING DIFFICULTIES: ICD-10-CM

## 2025-04-03 DIAGNOSIS — F41.9 ANXIETY DISORDER, UNSPECIFIED TYPE: Chronic | ICD-10-CM

## 2025-04-03 RX ORDER — LAMOTRIGINE 100 MG/1
100 TABLET ORAL 2 TIMES DAILY
Qty: 60 TABLET | Refills: 1 | Status: SHIPPED | OUTPATIENT
Start: 2025-04-03

## 2025-04-03 RX ORDER — BUSPIRONE HYDROCHLORIDE 30 MG/1
30 TABLET ORAL 2 TIMES DAILY
Qty: 60 TABLET | Refills: 1 | Status: SHIPPED | OUTPATIENT
Start: 2025-04-03

## 2025-04-03 RX ORDER — HYDROXYZINE HYDROCHLORIDE 10 MG/1
10 TABLET, FILM COATED ORAL 3 TIMES DAILY PRN
Qty: 90 TABLET | Refills: 1 | Status: SHIPPED | OUTPATIENT
Start: 2025-04-03

## 2025-04-03 RX ORDER — ROSUVASTATIN CALCIUM 10 MG/1
1 TABLET, FILM COATED ORAL DAILY
COMMUNITY
Start: 2025-03-31 | End: 2025-06-29

## 2025-04-03 RX ORDER — VILAZODONE HYDROCHLORIDE 40 MG/1
40 TABLET ORAL DAILY
Qty: 30 TABLET | Refills: 1 | Status: SHIPPED | OUTPATIENT
Start: 2025-04-03

## 2025-04-03 RX ORDER — TRAZODONE HYDROCHLORIDE 50 MG/1
50 TABLET ORAL NIGHTLY PRN
Qty: 30 TABLET | Refills: 1 | Status: SHIPPED | OUTPATIENT
Start: 2025-04-03

## 2025-04-03 NOTE — PROGRESS NOTES
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 medication is not given.  The patient has been given ample time to ask questions and study the information and find the information to be specific, accurate, and complete.  The patient gives verbal consent for the medication(s) proposed/prescribed, they verbalized understanding that they can refuse and withdraw consent at any time with the assistance of this APRN, and the patient has verbally confirmed that they are aware, and are willing, to take the prescribed medication and follow the treatment plan with the known possible risks, side effect, black box warnings, and any potential medication interactions, and the patient reports they will be worse off without this medication and treatment plan.  The patient is advised to contact this APRN/this office if any questions or concerns arise at any time (at [phone]), or call 911/go to the closest emergency department if needed or outside of office hours.      SUICIDE RISK ASSESSMENT AND SAFETY PLAN: Unalterable demographics and a history of mental health intervention indicate this patient is in a high risk category compared to the general population. At present, the patient denies active SI/HI, intentions, or plans at this time and  agrees to seek immediate care should such thoughts develop. The patient verbalizes understanding of how to access emergency care if needed and agrees to do so. Consideration of suicide risk and protective factors such as history, current presentation, individual strengths and weaknesses, psychosocial and environmental stressors and variables, psychiatric illness and symptoms, medical conditions and pain, took place in this interview. Based on those considerations, the patient is determined: within individual baseline and presenting no imminent risk for suicide or homicide. Other recommendations: The patient does not meet the criteria for inpatient admission and is not a safety risk to self or others at today's visit. Inpatient treatment offers no significant advantages over outpatient treatment for this patient at today's visit.  The patient was given ample time for questions and fully participated in treatment planning.  The patient was encouraged to call the clinic with any questions or concerns.  The patient was informed of access to emergency care. If patient were to develop any significant symptomatology, suicidal ideation, homicidal ideation, any concerns, or feel unsafe at any time they are to call the clinic and if unable to get immediate assistance should immediately call 911 or go to the nearest emergency room.  Patient contracted verbally for the following: If you are experiencing an emotional crisis or have thoughts of harming yourself or others, please go to your nearest local emergency room or call 911. Will continue to re-assess medication response and side effects frequently to establish efficacy and ensure safety. Risks, any black box warnings, side effects, off label usage, and benefits of medication and treatment discussed with patient, along with potential adverse side effects of current and/or newly prescribed medication, alternative treatment options, and OTC medications.  Patient verbalized  understanding of potential risks, any off label use of medication, any black box warnings, and any side effects in their own words. The patient verbalized understanding and agreed to comply with the safety plan discussed in their own words.  Patient given the number to the office. Number also discussed of the 24- hour suicide hotline.           MEDS ORDERED DURING VISIT:  New Medications Ordered This Visit   Medications    vilazodone (Viibryd) 40 MG tablet tablet     Sig: Take 1 tablet by mouth Daily.     Dispense:  30 tablet     Refill:  1    traZODone (DESYREL) 50 MG tablet     Sig: Take 1 tablet by mouth At Night As Needed for Sleep.     Dispense:  30 tablet     Refill:  1    lamoTRIgine (LaMICtal) 100 MG tablet     Sig: Take 1 tablet by mouth 2 (Two) Times a Day.     Dispense:  60 tablet     Refill:  1    hydrOXYzine (ATARAX) 10 MG tablet     Sig: Take 1 tablet by mouth 3 (Three) Times a Day As Needed (anxiety and/or sleep).     Dispense:  90 tablet     Refill:  1    busPIRone (BUSPAR) 30 MG tablet     Sig: Take 1 tablet by mouth 2 (Two) Times a Day.     Dispense:  60 tablet     Refill:  1     Return in about 4 weeks (around 5/1/2025), or if symptoms worsen or fail to improve, for Next scheduled follow up and Recheck.         Progress toward goal: Not at goal    Functional Status: Moderate impairment     Prognosis: Good with Ongoing Treatment              This document has been electronically signed by GISELA Vaughn  April 3, 2025 10:12 EDT    Some of the data in this electronic note has been brought forward from a previous encounter, any necessary changes have been made, it has been reviewed by this APRN, and it is accurate.    Please note that portions of this note were completed with a voice recognition program.

## 2025-04-11 ENCOUNTER — TELEPHONE (OUTPATIENT)
Dept: PSYCHIATRY | Facility: CLINIC | Age: 60
End: 2025-04-11
Payer: COMMERCIAL

## 2025-04-11 NOTE — TELEPHONE ENCOUNTER
Patient called , she has had a death in her immediate family and needed medication to get through the next couple of days . I informed patient that provider is out of office and will not return until Monday. I asked patient if she had a PCP and she does so I advised her to contact the PCP office to see if they can help.       Please Advise?      Thank You

## 2025-04-14 NOTE — TELEPHONE ENCOUNTER
I am sorry there has been a loss in the family.  I see where the patient's PCP sent in some Diazepam for the patient on Friday, [facility] [place] request number [id number].

## 2025-05-05 ENCOUNTER — TELEMEDICINE (OUTPATIENT)
Dept: PSYCHIATRY | Facility: CLINIC | Age: 60
End: 2025-05-05
Payer: COMMERCIAL

## 2025-05-05 DIAGNOSIS — G47.9 SLEEPING DIFFICULTIES: ICD-10-CM

## 2025-05-05 DIAGNOSIS — F41.9 ANXIETY DISORDER, UNSPECIFIED TYPE: Chronic | ICD-10-CM

## 2025-05-05 DIAGNOSIS — F33.1 MODERATE EPISODE OF RECURRENT MAJOR DEPRESSIVE DISORDER: Primary | Chronic | ICD-10-CM

## 2025-05-05 PROCEDURE — 1160F RVW MEDS BY RX/DR IN RCRD: CPT | Performed by: NURSE PRACTITIONER

## 2025-05-05 PROCEDURE — 96127 BRIEF EMOTIONAL/BEHAV ASSMT: CPT | Performed by: NURSE PRACTITIONER

## 2025-05-05 PROCEDURE — 1159F MED LIST DOCD IN RCRD: CPT | Performed by: NURSE PRACTITIONER

## 2025-05-05 PROCEDURE — 99214 OFFICE O/P EST MOD 30 MIN: CPT | Performed by: NURSE PRACTITIONER

## 2025-05-05 RX ORDER — BUSPIRONE HYDROCHLORIDE 30 MG/1
30 TABLET ORAL 2 TIMES DAILY
Qty: 60 TABLET | Refills: 0 | Status: SHIPPED | OUTPATIENT
Start: 2025-05-05

## 2025-05-05 RX ORDER — TRAZODONE HYDROCHLORIDE 50 MG/1
50 TABLET ORAL NIGHTLY PRN
Qty: 30 TABLET | Refills: 0 | Status: SHIPPED | OUTPATIENT
Start: 2025-05-05

## 2025-05-05 RX ORDER — LAMOTRIGINE 100 MG/1
100 TABLET ORAL 2 TIMES DAILY
Qty: 60 TABLET | Refills: 0 | Status: SHIPPED | OUTPATIENT
Start: 2025-05-05

## 2025-05-05 RX ORDER — HYDROXYZINE HYDROCHLORIDE 10 MG/1
10 TABLET, FILM COATED ORAL 3 TIMES DAILY PRN
Qty: 90 TABLET | Refills: 0 | Status: SHIPPED | OUTPATIENT
Start: 2025-05-05

## 2025-05-05 RX ORDER — DIAZEPAM 5 MG/1
TABLET ORAL
COMMUNITY
Start: 2025-04-11 | End: 2025-05-05

## 2025-05-05 RX ORDER — VILAZODONE HYDROCHLORIDE 40 MG/1
40 TABLET ORAL DAILY
Qty: 30 TABLET | Refills: 0 | Status: SHIPPED | OUTPATIENT
Start: 2025-05-05

## 2025-05-05 NOTE — PROGRESS NOTES
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 consent at any time with the assistance of this APRN, and the patient has verbally confirmed that they are aware, and are willing, to take the prescribed medication and follow the treatment plan with the known possible risks, side effect, black box warnings, and any potential medication interactions, and the patient reports they will be worse off without this medication and treatment plan.  The patient is advised to contact this APRN/this office if any questions or concerns arise at any time (at [phone]), or call 911/go to the closest emergency department if needed or outside of office hours.      SUICIDE RISK ASSESSMENT AND SAFETY PLAN: Unalterable demographics and a history of mental health intervention indicate this patient is in a high risk category compared to the general population. At present, the patient denies active SI/HI, intentions, or plans at this time and agrees to seek immediate care should such thoughts develop. The patient verbalizes understanding of how to access emergency care if needed and agrees to do so. Consideration of suicide risk and protective factors such as history, current presentation, individual strengths and weaknesses, psychosocial and environmental  stressors and variables, psychiatric illness and symptoms, medical conditions and pain, took place in this interview. Based on those considerations, the patient is determined: within individual baseline and presenting no imminent risk for suicide or homicide. Other recommendations: The patient does not meet the criteria for inpatient admission and is not a safety risk to self or others at today's visit. Inpatient treatment offers no significant advantages over outpatient treatment for this patient at today's visit.  The patient was given ample time for questions and fully participated in treatment planning.  The patient was encouraged to call the clinic with any questions or concerns.  The patient was informed of access to emergency care. If patient were to develop any significant symptomatology, suicidal ideation, homicidal ideation, any concerns, or feel unsafe at any time they are to call the clinic and if unable to get immediate assistance should immediately call 911 or go to the nearest emergency room.  Patient contracted verbally for the following: If you are experiencing an emotional crisis or have thoughts of harming yourself or others, please go to your nearest local emergency room or call 911. Will continue to re-assess medication response and side effects frequently to establish efficacy and ensure safety. Risks, any black box warnings, side effects, off label usage, and benefits of medication and treatment discussed with patient, along with potential adverse side effects of current and/or newly prescribed medication, alternative treatment options, and OTC medications.  Patient verbalized understanding of potential risks, any off label use of medication, any black box warnings, and any side effects in their own words. The patient verbalized understanding and agreed to comply with the safety plan discussed in their own words.  Patient given the number to the office. Number also discussed of the 24- hour  suicide hotline.           MEDS ORDERED DURING VISIT:  New Medications Ordered This Visit   Medications    vilazodone (Viibryd) 40 MG tablet tablet     Sig: Take 1 tablet by mouth Daily.     Dispense:  30 tablet     Refill:  0    lamoTRIgine (LaMICtal) 100 MG tablet     Sig: Take 1 tablet by mouth 2 (Two) Times a Day.     Dispense:  60 tablet     Refill:  0    busPIRone (BUSPAR) 30 MG tablet     Sig: Take 1 tablet by mouth 2 (Two) Times a Day.     Dispense:  60 tablet     Refill:  0    hydrOXYzine (ATARAX) 10 MG tablet     Sig: Take 1 tablet by mouth 3 (Three) Times a Day As Needed (anxiety and/or sleep).     Dispense:  90 tablet     Refill:  0    traZODone (DESYREL) 50 MG tablet     Sig: Take 1 tablet by mouth At Night As Needed for Sleep.     Dispense:  30 tablet     Refill:  0     Return in about 4 weeks (around 6/2/2025), or if symptoms worsen or fail to improve, for Next scheduled follow up and Recheck.         Progress toward goal: Not at goal    Functional Status: Moderate impairment     Prognosis: Good with Ongoing Treatment              This document has been electronically signed by GISELA Vaughn  May 5, 2025 09:14 EDT    Some of the data in this electronic note has been brought forward from a previous encounter, any necessary changes have been made, it has been reviewed by this APRN, and it is accurate.    Please note that portions of this note were completed with a voice recognition program.

## 2025-06-05 ENCOUNTER — TELEMEDICINE (OUTPATIENT)
Dept: PSYCHIATRY | Facility: CLINIC | Age: 60
End: 2025-06-05
Payer: COMMERCIAL

## 2025-06-05 DIAGNOSIS — F33.1 MODERATE EPISODE OF RECURRENT MAJOR DEPRESSIVE DISORDER: Primary | Chronic | ICD-10-CM

## 2025-06-05 DIAGNOSIS — G47.9 SLEEPING DIFFICULTIES: ICD-10-CM

## 2025-06-05 DIAGNOSIS — F41.9 ANXIETY DISORDER, UNSPECIFIED TYPE: Chronic | ICD-10-CM

## 2025-06-05 RX ORDER — BUSPIRONE HYDROCHLORIDE 30 MG/1
30 TABLET ORAL 2 TIMES DAILY
Qty: 60 TABLET | Refills: 0 | Status: SHIPPED | OUTPATIENT
Start: 2025-06-05

## 2025-06-05 RX ORDER — TRAZODONE HYDROCHLORIDE 50 MG/1
50 TABLET ORAL NIGHTLY PRN
Qty: 30 TABLET | Refills: 0 | Status: SHIPPED | OUTPATIENT
Start: 2025-06-05

## 2025-06-05 RX ORDER — VILAZODONE HYDROCHLORIDE 40 MG/1
40 TABLET ORAL DAILY
Qty: 30 TABLET | Refills: 0 | Status: SHIPPED | OUTPATIENT
Start: 2025-06-05

## 2025-06-05 RX ORDER — HYDROXYZINE HYDROCHLORIDE 10 MG/1
10 TABLET, FILM COATED ORAL 3 TIMES DAILY PRN
Qty: 90 TABLET | Refills: 0 | Status: SHIPPED | OUTPATIENT
Start: 2025-06-05

## 2025-06-05 RX ORDER — TEMAZEPAM 30 MG/1
CAPSULE ORAL EVERY 24 HOURS
COMMUNITY
Start: 2025-03-11

## 2025-06-05 RX ORDER — LAMOTRIGINE 100 MG/1
100 TABLET ORAL 2 TIMES DAILY
Qty: 60 TABLET | Refills: 0 | Status: SHIPPED | OUTPATIENT
Start: 2025-06-05

## 2025-06-05 NOTE — PROGRESS NOTES
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 side effect, black box warnings, and any potential medication interactions, and the patient reports they will be worse off without this medication and treatment plan.  The patient is advised to contact this APRN/this office if any questions or concerns arise at any time (at [phone]), or call 911/go to the closest emergency department if needed or outside of office hours.      SUICIDE RISK ASSESSMENT AND SAFETY PLAN: Unalterable demographics and a history of mental health intervention indicate this patient is in a high risk category compared to the general population. At present, the patient denies active SI/HI, intentions, or plans at this time and agrees to seek immediate care should such thoughts develop. The patient verbalizes understanding of how to access emergency care if needed and agrees to do so. Consideration of suicide risk and protective factors such as history, current presentation, individual strengths and weaknesses, psychosocial and environmental stressors and variables, psychiatric illness and symptoms, medical conditions and pain, took place in this interview. Based on those considerations, the patient is determined: within individual baseline and  presenting no imminent risk for suicide or homicide. Other recommendations: The patient does not meet the criteria for inpatient admission and is not a safety risk to self or others at today's visit. Inpatient treatment offers no significant advantages over outpatient treatment for this patient at today's visit.  The patient was given ample time for questions and fully participated in treatment planning.  The patient was encouraged to call the clinic with any questions or concerns.  The patient was informed of access to emergency care. If patient were to develop any significant symptomatology, suicidal ideation, homicidal ideation, any concerns, or feel unsafe at any time they are to call the clinic and if unable to get immediate assistance should immediately call 911 or go to the nearest emergency room.  Patient contracted verbally for the following: If you are experiencing an emotional crisis or have thoughts of harming yourself or others, please go to your nearest local emergency room or call 911. Will continue to re-assess medication response and side effects frequently to establish efficacy and ensure safety. Risks, any black box warnings, side effects, off label usage, and benefits of medication and treatment discussed with patient, along with potential adverse side effects of current and/or newly prescribed medication, alternative treatment options, and OTC medications.  Patient verbalized understanding of potential risks, any off label use of medication, any black box warnings, and any side effects in their own words. The patient verbalized understanding and agreed to comply with the safety plan discussed in their own words.  Patient given the number to the office. Number also discussed of the 24- hour suicide hotline.           MEDS ORDERED DURING VISIT:  New Medications Ordered This Visit   Medications    vilazodone (Viibryd) 40 MG tablet tablet     Sig: Take 1 tablet by mouth Daily.     Dispense:  30  tablet     Refill:  0    traZODone (DESYREL) 50 MG tablet     Sig: Take 1 tablet by mouth At Night As Needed for Sleep.     Dispense:  30 tablet     Refill:  0    lamoTRIgine (LaMICtal) 100 MG tablet     Sig: Take 1 tablet by mouth 2 (Two) Times a Day.     Dispense:  60 tablet     Refill:  0    hydrOXYzine (ATARAX) 10 MG tablet     Sig: Take 1 tablet by mouth 3 (Three) Times a Day As Needed (anxiety and/or sleep).     Dispense:  90 tablet     Refill:  0    busPIRone (BUSPAR) 30 MG tablet     Sig: Take 1 tablet by mouth 2 (Two) Times a Day.     Dispense:  60 tablet     Refill:  0     Return in about 4 weeks (around 7/3/2025), or if symptoms worsen or fail to improve, for Next scheduled follow up and Recheck.         Progress toward goal: Not at goal    Functional Status: Moderate impairment     Prognosis: Good with Ongoing Treatment              This document has been electronically signed by GISELA Vaughn  June 5, 2025 09:17 EDT    Some of the data in this electronic note has been brought forward from a previous encounter, any necessary changes have been made, it has been reviewed by this APRN, and it is accurate.    Please note that portions of this note were completed with a voice recognition program.

## 2025-07-03 ENCOUNTER — TELEMEDICINE (OUTPATIENT)
Dept: PSYCHIATRY | Facility: CLINIC | Age: 60
End: 2025-07-03
Payer: COMMERCIAL

## 2025-07-03 DIAGNOSIS — G47.9 SLEEPING DIFFICULTIES: ICD-10-CM

## 2025-07-03 DIAGNOSIS — F33.1 MODERATE EPISODE OF RECURRENT MAJOR DEPRESSIVE DISORDER: Primary | Chronic | ICD-10-CM

## 2025-07-03 DIAGNOSIS — F41.9 ANXIETY DISORDER, UNSPECIFIED TYPE: Chronic | ICD-10-CM

## 2025-07-03 RX ORDER — LAMOTRIGINE 100 MG/1
100 TABLET ORAL 2 TIMES DAILY
Qty: 60 TABLET | Refills: 1 | Status: SHIPPED | OUTPATIENT
Start: 2025-07-03

## 2025-07-03 RX ORDER — ROSUVASTATIN CALCIUM 10 MG/1
TABLET, COATED ORAL EVERY 24 HOURS
COMMUNITY
Start: 2025-03-31

## 2025-07-03 RX ORDER — BUSPIRONE HYDROCHLORIDE 30 MG/1
30 TABLET ORAL 2 TIMES DAILY
Qty: 60 TABLET | Refills: 1 | Status: SHIPPED | OUTPATIENT
Start: 2025-07-03

## 2025-07-03 RX ORDER — TRAZODONE HYDROCHLORIDE 50 MG/1
50 TABLET ORAL NIGHTLY PRN
Qty: 30 TABLET | Refills: 1 | Status: SHIPPED | OUTPATIENT
Start: 2025-07-03

## 2025-07-03 RX ORDER — VILAZODONE HYDROCHLORIDE 40 MG/1
40 TABLET ORAL DAILY
Qty: 30 TABLET | Refills: 1 | Status: SHIPPED | OUTPATIENT
Start: 2025-07-03

## 2025-07-03 RX ORDER — METHOCARBAMOL 500 MG/1
TABLET, FILM COATED ORAL EVERY 8 HOURS SCHEDULED
COMMUNITY

## 2025-07-03 RX ORDER — HYDROXYZINE HYDROCHLORIDE 10 MG/1
10 TABLET, FILM COATED ORAL 3 TIMES DAILY PRN
Qty: 90 TABLET | Refills: 1 | Status: SHIPPED | OUTPATIENT
Start: 2025-07-03

## 2025-07-03 RX ORDER — FEXOFENADINE HCL 180 MG/1
TABLET ORAL EVERY 24 HOURS
COMMUNITY

## 2025-07-03 NOTE — PROGRESS NOTES
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 the probable consequences that may occur if the proposed medication is not given.  The patient has been given ample time to ask questions and study the information and find the information to be specific, accurate, and complete.  The patient gives verbal consent for the medication(s) proposed/prescribed, they verbalized understanding that they can refuse and withdraw consent at any time with the assistance of this APRN, and the patient has verbally confirmed that they are aware, and are willing, to take the prescribed medication and follow the treatment plan with the known possible risks, side effect, black box warnings, and any potential medication interactions, and the patient reports they will be worse off without this medication and treatment plan.  The patient is advised to contact this APRN/this office if any questions or concerns arise at any time (at [phone]), or call 911/go to the closest emergency department if needed or outside of office hours.      SUICIDE RISK ASSESSMENT AND SAFETY PLAN: Unalterable demographics and a history of mental health intervention indicate this patient is in a high risk category compared to the general population. At present, the patient denies active SI/HI, intentions, or plans at  this time and agrees to seek immediate care should such thoughts develop. The patient verbalizes understanding of how to access emergency care if needed and agrees to do so. Consideration of suicide risk and protective factors such as history, current presentation, individual strengths and weaknesses, psychosocial and environmental stressors and variables, psychiatric illness and symptoms, medical conditions and pain, took place in this interview. Based on those considerations, the patient is determined: within individual baseline and presenting no imminent risk for suicide or homicide. Other recommendations: The patient does not meet the criteria for inpatient admission and is not a safety risk to self or others at today's visit. Inpatient treatment offers no significant advantages over outpatient treatment for this patient at today's visit.  The patient was given ample time for questions and fully participated in treatment planning.  The patient was encouraged to call the clinic with any questions or concerns.  The patient was informed of access to emergency care. If patient were to develop any significant symptomatology, suicidal ideation, homicidal ideation, any concerns, or feel unsafe at any time they are to call the clinic and if unable to get immediate assistance should immediately call 911 or go to the nearest emergency room.  Patient contracted verbally for the following: If you are experiencing an emotional crisis or have thoughts of harming yourself or others, please go to your nearest local emergency room or call 911. Will continue to re-assess medication response and side effects frequently to establish efficacy and ensure safety. Risks, any black box warnings, side effects, off label usage, and benefits of medication and treatment discussed with patient, along with potential adverse side effects of current and/or newly prescribed medication, alternative treatment options, and OTC medications.  Patient  verbalized understanding of potential risks, any off label use of medication, any black box warnings, and any side effects in their own words. The patient verbalized understanding and agreed to comply with the safety plan discussed in their own words.  Patient given the number to the office. Number also discussed of the 24- hour suicide hotline.           MEDS ORDERED DURING VISIT:  New Medications Ordered This Visit   Medications    vilazodone (Viibryd) 40 MG tablet tablet     Sig: Take 1 tablet by mouth Daily.     Dispense:  30 tablet     Refill:  1    traZODone (DESYREL) 50 MG tablet     Sig: Take 1 tablet by mouth At Night As Needed for Sleep.     Dispense:  30 tablet     Refill:  1    lamoTRIgine (LaMICtal) 100 MG tablet     Sig: Take 1 tablet by mouth 2 (Two) Times a Day.     Dispense:  60 tablet     Refill:  1    hydrOXYzine (ATARAX) 10 MG tablet     Sig: Take 1 tablet by mouth 3 (Three) Times a Day As Needed (anxiety and/or sleep).     Dispense:  90 tablet     Refill:  1    busPIRone (BUSPAR) 30 MG tablet     Sig: Take 1 tablet by mouth 2 (Two) Times a Day.     Dispense:  60 tablet     Refill:  1     Return in about 4 weeks (around 7/31/2025), or if symptoms worsen or fail to improve, for Next scheduled follow up and Recheck.           Future Appointments         Provider Department Center    8/13/2025 8:00 AM Bridget Chacko APRN [facility]                Progress toward goal: Not at goal    Functional Status: Moderate impairment     Prognosis: Good with Ongoing Treatment              This document has been electronically signed by GISELA Vaughn  July 3, 2025 15:20 EDT    Some of the data in this electronic note has been brought forward from a previous encounter, any necessary changes have been made, it has been reviewed by this APRN, and it is accurate.    Please note that portions of this note were completed with a voice recognition program.

## 2025-08-13 ENCOUNTER — TELEMEDICINE (OUTPATIENT)
Dept: PSYCHIATRY | Facility: CLINIC | Age: 60
End: 2025-08-13
Payer: COMMERCIAL

## 2025-08-13 DIAGNOSIS — F41.9 ANXIETY DISORDER, UNSPECIFIED TYPE: Chronic | ICD-10-CM

## 2025-08-13 DIAGNOSIS — F33.1 MODERATE EPISODE OF RECURRENT MAJOR DEPRESSIVE DISORDER: Primary | Chronic | ICD-10-CM

## 2025-08-13 DIAGNOSIS — G47.9 SLEEPING DIFFICULTIES: ICD-10-CM

## 2025-08-13 RX ORDER — METHOCARBAMOL 500 MG/1
TABLET, FILM COATED ORAL EVERY 8 HOURS SCHEDULED
COMMUNITY

## 2025-08-13 RX ORDER — TEMAZEPAM 30 MG/1
CAPSULE ORAL EVERY 24 HOURS
COMMUNITY
Start: 2025-07-14

## 2025-08-13 RX ORDER — LAMOTRIGINE 100 MG/1
100 TABLET ORAL 2 TIMES DAILY
Qty: 60 TABLET | Refills: 1 | Status: SHIPPED | OUTPATIENT
Start: 2025-08-13

## 2025-08-13 RX ORDER — NYSTATIN AND TRIAMCINOLONE ACETONIDE 100000; 1 [USP'U]/G; MG/G
CREAM TOPICAL
COMMUNITY
Start: 2025-07-14

## 2025-08-13 RX ORDER — TRAZODONE HYDROCHLORIDE 50 MG/1
50 TABLET ORAL NIGHTLY PRN
Qty: 30 TABLET | Refills: 1 | Status: SHIPPED | OUTPATIENT
Start: 2025-08-13

## 2025-08-13 RX ORDER — ROSUVASTATIN CALCIUM 10 MG/1
TABLET, COATED ORAL EVERY 24 HOURS
COMMUNITY
Start: 2025-03-31

## 2025-08-13 RX ORDER — IBUPROFEN 800 MG/1
TABLET, FILM COATED ORAL EVERY 8 HOURS SCHEDULED
COMMUNITY
End: 2025-10-12

## 2025-08-13 RX ORDER — VILAZODONE HYDROCHLORIDE 40 MG/1
40 TABLET ORAL DAILY
Qty: 30 TABLET | Refills: 1 | Status: SHIPPED | OUTPATIENT
Start: 2025-08-13

## 2025-08-13 RX ORDER — FEXOFENADINE HCL 180 MG/1
TABLET ORAL EVERY 24 HOURS
COMMUNITY

## 2025-08-13 RX ORDER — BUSPIRONE HYDROCHLORIDE 30 MG/1
30 TABLET ORAL 2 TIMES DAILY
Qty: 60 TABLET | Refills: 1 | Status: SHIPPED | OUTPATIENT
Start: 2025-08-13